# Patient Record
Sex: FEMALE | Race: WHITE | NOT HISPANIC OR LATINO | Employment: UNEMPLOYED | URBAN - METROPOLITAN AREA
[De-identification: names, ages, dates, MRNs, and addresses within clinical notes are randomized per-mention and may not be internally consistent; named-entity substitution may affect disease eponyms.]

---

## 2017-04-05 ENCOUNTER — TELEPHONE (OUTPATIENT)
Dept: PULMONOLOGY | Facility: CLINIC | Age: 57
End: 2017-04-05

## 2017-04-05 DIAGNOSIS — J43.9 PULMONARY EMPHYSEMA, UNSPECIFIED EMPHYSEMA TYPE (HCC): Primary | ICD-10-CM

## 2017-04-05 RX ORDER — ALBUTEROL SULFATE 90 UG/1
2 AEROSOL, METERED RESPIRATORY (INHALATION) AS NEEDED
Status: DISCONTINUED | OUTPATIENT
Start: 2017-04-05 | End: 2021-01-04

## 2017-04-05 NOTE — TELEPHONE ENCOUNTER
Patient called requesting Rx for Qvar, ProAir, and something to replace her Symbicort inhaler since it was not covered.    E-scribed Qvar and ProAir IN per KB.  Called and spoke with pharmacist who stated both Qvar and Dulera 200 (to replace Symbicort) were both covered. He stated the ProAir was not covered but he'd run an Rx for an albuterol rescue inhaler that is covered.       2-4 times/mo

## 2017-05-08 RX ORDER — TRIAMCINOLONE ACETONIDE 1 MG/G
CREAM TOPICAL
Qty: 45 G | Refills: 0 | Status: SHIPPED | OUTPATIENT
Start: 2017-05-08 | End: 2018-10-30 | Stop reason: SDUPTHER

## 2017-06-27 RX ORDER — TIOTROPIUM BROMIDE 18 UG/1
CAPSULE ORAL; RESPIRATORY (INHALATION)
Qty: 30 CAPSULE | Refills: 11 | Status: SHIPPED | OUTPATIENT
Start: 2017-06-27 | End: 2017-08-28 | Stop reason: SDUPTHER

## 2017-06-27 RX ORDER — PREDNISONE 20 MG/1
TABLET ORAL
Qty: 30 TABLET | Refills: 3 | Status: SHIPPED | OUTPATIENT
Start: 2017-06-27 | End: 2018-03-05 | Stop reason: SDUPTHER

## 2017-07-31 DIAGNOSIS — J43.9 PULMONARY EMPHYSEMA, UNSPECIFIED EMPHYSEMA TYPE (HCC): ICD-10-CM

## 2017-07-31 RX ORDER — MOMETASONE FUROATE AND FORMOTEROL FUMARATE DIHYDRATE 200; 5 UG/1; UG/1
AEROSOL RESPIRATORY (INHALATION)
Qty: 13 G | Refills: 0 | Status: SHIPPED | OUTPATIENT
Start: 2017-07-31 | End: 2017-08-28 | Stop reason: SDUPTHER

## 2017-08-28 ENCOUNTER — OFFICE VISIT (OUTPATIENT)
Dept: PULMONOLOGY | Facility: CLINIC | Age: 57
End: 2017-08-28

## 2017-08-28 VITALS
OXYGEN SATURATION: 95 % | DIASTOLIC BLOOD PRESSURE: 74 MMHG | HEIGHT: 66 IN | TEMPERATURE: 98.3 F | SYSTOLIC BLOOD PRESSURE: 120 MMHG | BODY MASS INDEX: 23.3 KG/M2 | HEART RATE: 57 BPM | WEIGHT: 145 LBS | RESPIRATION RATE: 16 BRPM

## 2017-08-28 DIAGNOSIS — Z87.891 PERSONAL HISTORY OF TOBACCO USE, PRESENTING HAZARDS TO HEALTH: ICD-10-CM

## 2017-08-28 DIAGNOSIS — Z99.81 DEPENDENCE ON SUPPLEMENTAL OXYGEN: ICD-10-CM

## 2017-08-28 DIAGNOSIS — J43.9 PULMONARY EMPHYSEMA, UNSPECIFIED EMPHYSEMA TYPE (HCC): Primary | ICD-10-CM

## 2017-08-28 PROCEDURE — 99213 OFFICE O/P EST LOW 20 MIN: CPT | Performed by: NURSE PRACTITIONER

## 2017-08-28 NOTE — PROGRESS NOTES
[facility] Pulmonary Follow up    CHIEF COMPLAINT    COPD    HISTORY OF PRESENT ILLNESS    Nolvia Huff is a 57 y.o.female here today for follow-up on her severe COPD and nocturnal hypoxemia.    She last saw Samreen Sandra on 10/4/16.  She reports she's been doing well since then with no acute illnesses or exacerbations.  She remains on Dulera 200 µg twice a day, Spiriva HandiHaler once daily, and Ventolin as needed.  She reports she uses her Ventolin 2-3 times a day.  She also has Qvar and prednisone that she uses as needed when she experiences increased wheezing.  She continues to take azithromycin 3 times a week as well as Mucinex daily.  She has an occasional cough with clear sputum.  She has Tessalon available but rarely takes this.  She denies any fever or chills.    She is under some stress as her mother is currently in a LTACH and she has moved her father into her home to help care for him.  She was actually supposed to see Samreen Sandra later on this week but requested to be seen today as her father was in the office as well.    She has a 30 year history of smoking before quitting in .  She reports a CT of the chest for lung cancer screening has been ordered for her before.  She had some issues with scheduling and reports eventually the order  before she could have this done.  She is interested in having a CT of the chest done at this time.    Patient Active Problem List   Diagnosis   • Dependence on supplemental oxygen   • Tobacco abuse   • Nocturnal hypoxemia   • Anxiety disorder   • Dyslipidemia   • Emphysema/COPD   • GERD (gastroesophageal reflux disease)   • Osteoporosis   • Personal history of tobacco use, presenting hazards to health       Allergies   Allergen Reactions   • Penicillins        Current Outpatient Prescriptions:   •  benzonatate (TESSALON) 100 MG capsule, Take  by mouth., Disp: , Rfl:   •  guaiFENesin (MUCINEX) 600 MG 12 hr tablet, Take  by mouth Every 12 (Twelve) Hours.,  Disp: , Rfl:   •  mometasone-formoterol (DULERA) 200-5 MCG/ACT inhaler, Inhale 2 puffs 2 (Two) Times a Day., Disp: 13 g, Rfl: 0  •  montelukast (SINGULAIR) 10 MG tablet, Take 1 tablet by mouth Every Night., Disp: 30 tablet, Rfl: 11  •  predniSONE (DELTASONE) 20 MG tablet, Take  by mouth As Needed., Disp: , Rfl:   •  predniSONE (DELTASONE) 20 MG tablet, TAKE 1 TABLET BY MOUTH EVERY DAY, Disp: 30 tablet, Rfl: 3  •  QVAR 80 MCG/ACT inhaler, INHALE 2 PUFFS 2 (TWO) TIMES A DAY., Disp: 1 inhaler, Rfl: 0  •  tiotropium (SPIRIVA HANDIHALER) 18 MCG per inhalation capsule, Place 1 capsule into inhaler and inhale Daily., Disp: 30 capsule, Rfl: 11  •  tiotropium (SPIRIVA) 18 MCG per inhalation capsule, Place 2 puffs into inhaler and inhale Daily., Disp: 30 capsule, Rfl: 11  •  triamcinolone (KENALOG) 0.1 % cream, APPLY SPARINGLY AND MASSAGE IN TWICE DAILY, Disp: 45 g, Rfl: 0  •  VENTOLIN  (90 BASE) MCG/ACT inhaler, INHALE 1 TO 2 PUFFS EVERY 6 HOURS AS NEEDED., Disp: 18 g, Rfl: 6    Current Facility-Administered Medications:   •  albuterol (PROVENTIL HFA;VENTOLIN HFA) inhaler 2 puff, 2 puff, Inhalation, PRN, Samreen Sandra, APRN  MEDICATION LIST AND ALLERGIES REVIEWED.    Social History   Substance Use Topics   • Smoking status: Former Smoker     Packs/day: 2.00     Years: 30.00     Types: Cigarettes     Quit date: 2004   • Smokeless tobacco: None      Comment: fomer smoker 2 ppd x 30 years-Still smoke 1 cig, sporadically   • Alcohol use Yes      Comment: 1/2 drinks per year       FAMILY AND SOCIAL HISTORY REVIEWED.    Review of Systems   Constitutional: Negative for chills, fatigue, fever and unexpected weight change.   HENT: Negative for congestion, nosebleeds, postnasal drip, rhinorrhea, sinus pressure and trouble swallowing.    Respiratory: Positive for cough and wheezing. Negative for chest tightness and shortness of breath.    Cardiovascular: Negative for chest pain and leg swelling.   Gastrointestinal: Negative  "for abdominal pain, constipation, diarrhea, nausea and vomiting.   Genitourinary: Negative for dysuria, frequency, hematuria and urgency.   Musculoskeletal: Negative for myalgias.   Neurological: Negative for dizziness, weakness, numbness and headaches.   All other systems reviewed and are negative.  .    /74  Pulse 57  Temp 98.3 °F (36.8 °C)  Resp 16  Ht 66\" (167.6 cm)  Wt 145 lb (65.8 kg)  SpO2 95% Comment: RA  BMI 23.4 kg/m2    Physical Exam   Constitutional: She is oriented to person, place, and time. She appears well-developed. No distress.   HENT:   Head: Normocephalic and atraumatic.   Neck: Neck supple.   Cardiovascular: Normal rate, regular rhythm and normal heart sounds.  Exam reveals no friction rub.    No murmur heard.  Pulmonary/Chest: Effort normal and breath sounds normal. No respiratory distress. She has no wheezes. She has no rales.   Musculoskeletal: Normal range of motion. She exhibits no edema.   Neurological: She is alert and oriented to person, place, and time.   Skin: Skin is warm and dry. No erythema.   Psychiatric: She has a normal mood and affect. Her behavior is normal.   Vitals reviewed.        RESULTS        PROBLEM LIST    Problem List Items Addressed This Visit        Respiratory    Emphysema/COPD - Primary    Overview      Emphysema/COPD (492.8)   · A.  Moderate to severe obstruction, former tobacco abuse.B.  Controlled with the      azithromycin every Monday Wednesday Friday.      B. 07/06/15 Spirometry reveals an increase in obstruction, FEV1 now 42% instead of 53%. Minute ventilation reduced. Forced vital capacity is reduced suggesting a restrictive pattern.          Relevant Medications    mometasone-formoterol (DULERA) 200-5 MCG/ACT inhaler    tiotropium (SPIRIVA HANDIHALER) 18 MCG per inhalation capsule       Other    Dependence on supplemental oxygen    Personal history of tobacco use, presenting hazards to health            DISCUSSION    Continue current " medication regimen of Dulera, Spiriva, azithromycin, and Mucinex.  Continue Qvar, Ventolin, and prednisone as needed.  Continue supplemental oxygen at night.  Low-dose CT of the chest for lung cancer screening.  Follow-up in 6 months.  We will try to coordinate her appointment to be the same day as her father's.      Arelis Mae, APRN  08/28/201711:09 AM  Electronically signed     Please note that portions of this note were completed with a voice recognition program. Efforts were made to edit the dictations, but occasionally words are mistranscribed.      CC: Alfonso Denney MD

## 2017-09-27 ENCOUNTER — HOSPITAL ENCOUNTER (OUTPATIENT)
Dept: CT IMAGING | Facility: HOSPITAL | Age: 57
Discharge: HOME | End: 2017-09-27
Admitting: NURSE PRACTITIONER

## 2017-09-27 DIAGNOSIS — Z87.891 PERSONAL HISTORY OF TOBACCO USE, PRESENTING HAZARDS TO HEALTH: ICD-10-CM

## 2017-09-27 PROCEDURE — G0297 LDCT FOR LUNG CA SCREEN: HCPCS

## 2017-10-02 RX ORDER — MOMETASONE FUROATE AND FORMOTEROL FUMARATE DIHYDRATE 200; 5 UG/1; UG/1
AEROSOL RESPIRATORY (INHALATION)
Qty: 13 G | Refills: 7 | Status: SHIPPED | OUTPATIENT
Start: 2017-10-02 | End: 2018-06-03 | Stop reason: SDUPTHER

## 2018-03-05 ENCOUNTER — OFFICE VISIT (OUTPATIENT)
Dept: PULMONOLOGY | Facility: CLINIC | Age: 58
End: 2018-03-05

## 2018-03-05 VITALS
BODY MASS INDEX: 24.1 KG/M2 | SYSTOLIC BLOOD PRESSURE: 130 MMHG | WEIGHT: 159 LBS | DIASTOLIC BLOOD PRESSURE: 80 MMHG | HEIGHT: 68 IN | OXYGEN SATURATION: 90 % | HEART RATE: 78 BPM | TEMPERATURE: 98.6 F

## 2018-03-05 DIAGNOSIS — R06.02 SHORTNESS OF BREATH: Primary | ICD-10-CM

## 2018-03-05 PROCEDURE — 94060 EVALUATION OF WHEEZING: CPT | Performed by: NURSE PRACTITIONER

## 2018-03-05 PROCEDURE — 99214 OFFICE O/P EST MOD 30 MIN: CPT | Performed by: NURSE PRACTITIONER

## 2018-03-05 RX ORDER — ALBUTEROL SULFATE 90 UG/1
2 AEROSOL, METERED RESPIRATORY (INHALATION) EVERY 4 HOURS PRN
Qty: 18 G | Refills: 11 | Status: SHIPPED | OUTPATIENT
Start: 2018-03-05 | End: 2019-01-30 | Stop reason: SDUPTHER

## 2018-03-05 RX ORDER — OMEPRAZOLE 40 MG/1
40 CAPSULE, DELAYED RELEASE ORAL DAILY
Qty: 30 CAPSULE | Refills: 11 | Status: SHIPPED | OUTPATIENT
Start: 2018-03-05

## 2018-03-05 RX ORDER — ALBUTEROL SULFATE 90 UG/1
4 AEROSOL, METERED RESPIRATORY (INHALATION) ONCE
Status: COMPLETED | OUTPATIENT
Start: 2018-03-05 | End: 2018-03-05

## 2018-03-05 RX ORDER — AZITHROMYCIN 250 MG/1
TABLET, FILM COATED ORAL
Qty: 15 TABLET | Refills: 2 | Status: SHIPPED | OUTPATIENT
Start: 2018-03-05 | End: 2018-09-10 | Stop reason: SDUPTHER

## 2018-03-05 RX ORDER — PREDNISONE 20 MG/1
TABLET ORAL
Qty: 14 TABLET | Refills: 2 | Status: SHIPPED | OUTPATIENT
Start: 2018-03-05 | End: 2018-09-10

## 2018-03-05 RX ORDER — MONTELUKAST SODIUM 10 MG/1
10 TABLET ORAL NIGHTLY
Qty: 30 TABLET | Refills: 11 | Status: SHIPPED | OUTPATIENT
Start: 2018-03-05 | End: 2018-09-10 | Stop reason: SDUPTHER

## 2018-03-05 RX ADMIN — ALBUTEROL SULFATE 4 PUFF: 90 AEROSOL, METERED RESPIRATORY (INHALATION) at 09:04

## 2018-03-05 NOTE — PROGRESS NOTES
Deyvi with bronchodilator. 4 puffs of Ventolin HFA administered. Patient tolerated procedure well.

## 2018-03-05 NOTE — PROGRESS NOTES
[facility] Pulmonary Follow up    CHIEF COMPLAINT    Copd, former tobacco abuse, nocturnal hypoxemia    HISTORY OF PRESENT ILLNESS    Nolvia Huff is a 58 y.o.female here today for fu. She was last in the office and saw Arelis HO 2017. She is a history of severe end-stage COPD, frequent bronchitis, nocturnal hypoxemia.  She is here today for follow-up.    She had a low-dose screening CT  showing moderate bibasilar bronchiectasis, obstructive lung disease with emphysema, and a 6 mm pleural-based nodule in the right chest, associated with emphysematous bulla, low index of suspicion.  She is due for another scan in a couple weeks.    She is on oxygen 2 L at night prior overnight testing showed she does desaturate, she remains on 2 L at night.  She wears this each night.    She remains on dulera 200, 2 puffs twice a day, Spiriva hand-held, albuterol nebulizers 3-4 times a day which help with her shortness of breath but occasionally don't last.  She denies fevers chills or night sweats and has no hemoptysis or recent hospitalization.  No recent emergency room visit.  She has a lot of stress in her family as her father , her mother has been sick; and she still cares for her graddaughter.     She has some epigastric type chest pain that comes and goes.  She takes prilosec occasionally .  She's not sure if its pain related to her heart; as she has no history of heart disease.  She does have a history of reflux.    She takes Azythromycin as needed for bronchitis; as well as prednisone.     She still has an occasional cough as above, she has Tessalon Perles which helped.  She also has prednisone tapers that she uses a few days at a time a couple times a month for dyspnea.  She does have a little bit more dyspnea than usual but she's not sure if it's related to stress given the recent loss of her father and all her family issues.        Patient Active Problem List   Diagnosis   • Dependence on  supplemental oxygen   • Tobacco abuse   • Nocturnal hypoxemia   • Anxiety disorder   • Dyslipidemia   • Emphysema/COPD   • GERD (gastroesophageal reflux disease)   • Osteoporosis   • Personal history of tobacco use, presenting hazards to health       Allergies   Allergen Reactions   • Penicillins        Current Outpatient Prescriptions:   •  benzonatate (TESSALON) 100 MG capsule, Take  by mouth., Disp: , Rfl:   •  DULERA 200-5 MCG/ACT inhaler, INHALE 2 PUFFS 2 (TWO) TIMES A DAY., Disp: 13 g, Rfl: 7  •  guaiFENesin (MUCINEX) 600 MG 12 hr tablet, Take  by mouth Every 12 (Twelve) Hours., Disp: , Rfl:   •  montelukast (SINGULAIR) 10 MG tablet, Take 1 tablet by mouth Every Night., Disp: 30 tablet, Rfl: 11  •  predniSONE (DELTASONE) 20 MG tablet, Take  by mouth As Needed., Disp: , Rfl:   •  QVAR 80 MCG/ACT inhaler, INHALE 2 PUFFS 2 (TWO) TIMES A DAY., Disp: 1 inhaler, Rfl: 0  •  tiotropium (SPIRIVA HANDIHALER) 18 MCG per inhalation capsule, Place 1 capsule into inhaler and inhale Daily., Disp: 30 capsule, Rfl: 11  •  triamcinolone (KENALOG) 0.1 % cream, APPLY SPARINGLY AND MASSAGE IN TWICE DAILY, Disp: 45 g, Rfl: 0  •  VENTOLIN  (90 BASE) MCG/ACT inhaler, INHALE 1 TO 2 PUFFS EVERY 6 HOURS AS NEEDED., Disp: 18 g, Rfl: 6    Current Facility-Administered Medications:   •  albuterol (PROVENTIL HFA;VENTOLIN HFA) inhaler 2 puff, 2 puff, Inhalation, PRN, Samreen Sandra, APRN  MEDICATION LIST AND ALLERGIES REVIEWED.    Social History   Substance Use Topics   • Smoking status: Former Smoker     Packs/day: 2.00     Years: 30.00     Types: Cigarettes     Quit date: 2004   • Smokeless tobacco: None      Comment: fomer smoker 2 ppd x 30 years-Still smoke 1 cig, sporadically   • Alcohol use Yes      Comment: 1/2 drinks per year       FAMILY AND SOCIAL HISTORY REVIEWED.    Review of Systems   Constitutional: Negative for activity change, chills, fatigue and fever.   HENT: Positive for postnasal drip. Negative for hearing loss,  "nosebleeds and sneezing.    Respiratory: Positive for cough and shortness of breath. Negative for apnea, chest tightness, wheezing and stridor.    Cardiovascular: Negative for chest pain, palpitations and leg swelling.        NO INCREASE IN MILD BLE EDEMA; NO CALF TENDERNESS    Gastrointestinal: Negative for abdominal pain, diarrhea and nausea.   Neurological: Negative for dizziness, syncope and light-headedness.   .    /80 (BP Location: Right arm, Patient Position: Sitting, Cuff Size: Adult)  Pulse 78  Temp 98.6 °F (37 °C)  Ht 172.7 cm (68\")  Wt 72.1 kg (159 lb)  SpO2 90%  BMI 24.18 kg/m2  Physical Exam   Constitutional: She is oriented to person, place, and time. She appears well-developed. No distress.   HENT:   Head: Normocephalic.   Eyes: Pupils are equal, round, and reactive to light.   Neck: Normal range of motion. No JVD present. No thyromegaly present.   Cardiovascular: Normal rate, regular rhythm and normal heart sounds.  Exam reveals no friction rub.    Mild BLE edema; no venous cords or calf tenderness.    Pulmonary/Chest: Effort normal. No stridor. No respiratory distress. She has no wheezes. She has no rales. She exhibits no tenderness.   Lymphadenopathy:     She has no cervical adenopathy.   Neurological: She is alert and oriented to person, place, and time.   Skin: She is not diaphoretic.   Psychiatric: She has a normal mood and affect. Her behavior is normal. Thought content normal.       RESULTS    Spirometry is improved compared to 2016, at that time she had an FEV1 of 39%, 1.09 L.  Today FEV1 is 1.38 L, 50%.      PROBLEM LIST    Moderate COPD  Prior tobacco abuse  Incidental 6 mm right lung nodule  GERD  SOB  Caregiver strain    DISCUSSION    Because of the nodule found September 2017 we'll do a 6 month follow-up in a couple weeks, she was given our written phone number extension [phone] to call for the results but we will of course call her with her now the CT findings prior to her next " visit.    She has slightly concerned given her family history of lung cancer.  She has remained off cigarettes however since 2004.    Continue on the oxygen at night.    She was given samples of Spiriva Respimat 2.5; written instructions to try to use this instead of the handheld, written prescription for each to turn in as she can always switch to the respimat if she likes this better    Continue on Dulera, continue on Singulair and Ventolin, continue oxygen at night.  Sent to the pharmacy.    She'll follow-up in 6 months,and was told to call and given extensions [phone] if needed for an earlier visit if problems arise prior to the scheduled followup.      Samreen Sandra, APRN  03/05/20189:54 AM  Electronically signed     Please note that portions of this note were completed with a voice recognition program. Efforts were made to edit the dictations, but occasionally words are mistranscribed.      CC: Alfonso Denney MD

## 2018-03-15 DIAGNOSIS — R91.1 LUNG NODULE: Primary | ICD-10-CM

## 2018-03-15 DIAGNOSIS — Z72.0 TOBACCO ABUSE: ICD-10-CM

## 2018-03-26 ENCOUNTER — HOSPITAL ENCOUNTER (OUTPATIENT)
Dept: CT IMAGING | Facility: HOSPITAL | Age: 58
Discharge: HOME | End: 2018-03-26
Admitting: NURSE PRACTITIONER

## 2018-03-26 DIAGNOSIS — Z72.0 TOBACCO ABUSE: ICD-10-CM

## 2018-03-26 DIAGNOSIS — R91.1 LUNG NODULE: ICD-10-CM

## 2018-03-26 PROCEDURE — 71250 CT THORAX DX C-: CPT

## 2018-04-02 ENCOUNTER — TELEPHONE (OUTPATIENT)
Dept: PULMONOLOGY | Facility: CLINIC | Age: 58
End: 2018-04-02

## 2018-04-02 NOTE — TELEPHONE ENCOUNTER
Ms Huff called for her CT results. Her 6 mm nodule is stable. She is concerned about the possibility of cancer. I reassured her that he nodule is unchanged but we will continue to monitor her with serial scans.

## 2018-06-04 RX ORDER — MOMETASONE FUROATE AND FORMOTEROL FUMARATE DIHYDRATE 200; 5 UG/1; UG/1
AEROSOL RESPIRATORY (INHALATION)
Qty: 13 G | Refills: 7 | Status: SHIPPED | OUTPATIENT
Start: 2018-06-04 | End: 2018-09-10 | Stop reason: SDUPTHER

## 2018-06-04 RX ORDER — PREDNISONE 20 MG/1
TABLET ORAL
Qty: 14 TABLET | Refills: 2 | OUTPATIENT
Start: 2018-06-04

## 2018-06-04 RX ORDER — AZITHROMYCIN 250 MG/1
TABLET, FILM COATED ORAL
Qty: 15 TABLET | Refills: 2 | OUTPATIENT
Start: 2018-06-04

## 2018-07-02 RX ORDER — TIOTROPIUM BROMIDE 18 UG/1
CAPSULE ORAL; RESPIRATORY (INHALATION)
Qty: 30 CAPSULE | Refills: 11 | Status: SHIPPED | OUTPATIENT
Start: 2018-07-02 | End: 2019-01-30 | Stop reason: SDUPTHER

## 2018-09-10 ENCOUNTER — OFFICE VISIT (OUTPATIENT)
Dept: PULMONOLOGY | Facility: CLINIC | Age: 58
End: 2018-09-10

## 2018-09-10 VITALS
OXYGEN SATURATION: 93 % | TEMPERATURE: 98.5 F | BODY MASS INDEX: 23.66 KG/M2 | SYSTOLIC BLOOD PRESSURE: 110 MMHG | WEIGHT: 147.2 LBS | HEART RATE: 81 BPM | DIASTOLIC BLOOD PRESSURE: 64 MMHG | HEIGHT: 66 IN | RESPIRATION RATE: 16 BRPM

## 2018-09-10 DIAGNOSIS — G47.34 NOCTURNAL HYPOXEMIA: ICD-10-CM

## 2018-09-10 DIAGNOSIS — Z87.891 PERSONAL HISTORY OF TOBACCO USE, PRESENTING HAZARDS TO HEALTH: ICD-10-CM

## 2018-09-10 DIAGNOSIS — J43.9 PULMONARY EMPHYSEMA, UNSPECIFIED EMPHYSEMA TYPE (HCC): Primary | ICD-10-CM

## 2018-09-10 DIAGNOSIS — Z12.2 ENCOUNTER FOR SCREENING FOR LUNG CANCER: ICD-10-CM

## 2018-09-10 PROCEDURE — 99214 OFFICE O/P EST MOD 30 MIN: CPT | Performed by: NURSE PRACTITIONER

## 2018-09-10 PROCEDURE — 94060 EVALUATION OF WHEEZING: CPT | Performed by: NURSE PRACTITIONER

## 2018-09-10 RX ORDER — ALBUTEROL SULFATE 90 UG/1
4 AEROSOL, METERED RESPIRATORY (INHALATION) ONCE
Status: COMPLETED | OUTPATIENT
Start: 2018-09-10 | End: 2018-09-10

## 2018-09-10 RX ORDER — AZITHROMYCIN 250 MG/1
TABLET, FILM COATED ORAL
Qty: 15 TABLET | Refills: 2 | Status: SHIPPED | OUTPATIENT
Start: 2018-09-10 | End: 2019-07-30

## 2018-09-10 RX ORDER — ALBUTEROL SULFATE 2.5 MG/3ML
2.5 SOLUTION RESPIRATORY (INHALATION) 4 TIMES DAILY PRN
Qty: 120 VIAL | Refills: 5 | Status: SHIPPED | OUTPATIENT
Start: 2018-09-10 | End: 2020-03-09 | Stop reason: SDUPTHER

## 2018-09-10 RX ADMIN — ALBUTEROL SULFATE 4 PUFF: 90 AEROSOL, METERED RESPIRATORY (INHALATION) at 10:56

## 2018-09-10 NOTE — PROGRESS NOTES
[facility] Pulmonary Follow up    CHIEF COMPLAINT    COPD, anemia, history of tobacco abuse    HISTORY OF PRESENT ILLNESS    Nolvia Huff is a 58 y.o.female former smoker with severe COPD and nocturnal hypoxemia here today for follow-up.    She last saw HERNAN Mora in March.  She has required a round of azithromycin since then.  She also indicates that she had a little bit more cough and congestion due to the recent weather changes.  She has Singulair but does not take this on a regular basis.  She feels like her wheezing and shortness of breath are about baseline.    She remains on Dulera, Spiriva, and albuterol.  She also has Qvar that she takes as needed when she feel she needs an increased ICS dose.  She continues on 2 L supplemental oxygen at night.    She had a low-dose screen CT of the chest in September 2017 which revealed a 6 mm nodule.  6 month follow-up CT in March 2018 demonstrated stability.      Patient Active Problem List   Diagnosis   • Dependence on supplemental oxygen   • Tobacco abuse   • Nocturnal hypoxemia   • Anxiety disorder   • Dyslipidemia   • Emphysema/COPD (CMS/HCC)   • GERD (gastroesophageal reflux disease)   • Osteoporosis   • Personal history of tobacco use, presenting hazards to health       Allergies   Allergen Reactions   • Penicillins        Current Outpatient Prescriptions:   •  albuterol (PROVENTIL HFA;VENTOLIN HFA) 108 (90 Base) MCG/ACT inhaler, Inhale 2 puffs Every 4 (Four) Hours As Needed for Wheezing., Disp: 18 g, Rfl: 11  •  benzonatate (TESSALON) 100 MG capsule, Take  by mouth., Disp: , Rfl:   •  guaiFENesin (MUCINEX) 600 MG 12 hr tablet, Take  by mouth Every 12 (Twelve) Hours., Disp: , Rfl:   •  mometasone-formoterol (DULERA 200) 200-5 MCG/ACT inhaler, 2 puffs am and pm; rinse and spit after use., Disp: 13 g, Rfl: 11  •  montelukast (SINGULAIR) 10 MG tablet, Take 1 tablet by mouth Every Night., Disp: 30 tablet, Rfl: 11  •  omeprazole (priLOSEC) 40 MG capsule, Take 1  capsule by mouth Daily., Disp: 30 capsule, Rfl: 11  •  QVAR 80 MCG/ACT inhaler, INHALE 2 PUFFS 2 (TWO) TIMES A DAY. (Patient taking differently: INHALE 2 PUFFS 2 PRN), Disp: 1 inhaler, Rfl: 0  •  SPIRIVA HANDIHALER 18 MCG per inhalation capsule, INHALE 1 CAPSULE IN DEVICE ONCE DAILY AS DIRECTED, Disp: 30 capsule, Rfl: 11  •  triamcinolone (KENALOG) 0.1 % cream, APPLY SPARINGLY AND MASSAGE IN TWICE DAILY, Disp: 45 g, Rfl: 0  •  albuterol (PROVENTIL) (2.5 MG/3ML) 0.083% nebulizer solution, Take 2.5 mg by nebulization 4 (Four) Times a Day As Needed for Wheezing., Disp: 120 vial, Rfl: 5  •  azithromycin (ZITHROMAX) 250 MG tablet, Take 2 daily x 1 day, then 1 daily x 13 days., Disp: 15 tablet, Rfl: 2    Current Facility-Administered Medications:   •  albuterol (PROVENTIL HFA;VENTOLIN HFA) inhaler 2 puff, 2 puff, Inhalation, PRN, Jocy, Samreen SOSA, HERNAN  MEDICATION LIST AND ALLERGIES REVIEWED.    Social History   Substance Use Topics   • Smoking status: Former Smoker     Packs/day: 2.00     Years: 30.00     Types: Cigarettes     Quit date: 2004   • Smokeless tobacco: Not on file      Comment: fomer smoker 2 ppd x 30 years-Still smoke 1 cig, sporadically   • Alcohol use Yes      Comment: 1/2 drinks per year       FAMILY AND SOCIAL HISTORY REVIEWED.    Review of Systems   Constitutional: Negative for chills, fatigue, fever and unexpected weight change.   HENT: Positive for congestion. Negative for nosebleeds, postnasal drip, rhinorrhea, sinus pressure and trouble swallowing.    Respiratory: Positive for cough and wheezing. Negative for chest tightness and shortness of breath.    Cardiovascular: Negative for chest pain and leg swelling.   Gastrointestinal: Negative for abdominal pain, constipation, diarrhea, nausea and vomiting.   Genitourinary: Negative for dysuria, frequency, hematuria and urgency.   Musculoskeletal: Negative for myalgias.   Neurological: Negative for dizziness, weakness, numbness and headaches.   All other  "systems reviewed and are negative.  .    /64   Pulse 81   Temp 98.5 °F (36.9 °C)   Resp 16   Ht 167.6 cm (66\")   Wt 66.8 kg (147 lb 3.2 oz)   SpO2 93% Comment: RA@rest  BMI 23.76 kg/m²     Immunization History   Administered Date(s) Administered   • Influenza, Unspecified 10/04/2016       Physical Exam   Constitutional: She is oriented to person, place, and time. She appears well-developed. No distress.   HENT:   Head: Normocephalic and atraumatic.   Neck: Neck supple.   Cardiovascular: Normal rate, regular rhythm and normal heart sounds.  Exam reveals no friction rub.    No murmur heard.  Pulmonary/Chest: Effort normal and breath sounds normal. No stridor. No respiratory distress. She has no wheezes. She has no rales.   Musculoskeletal: Normal range of motion. She exhibits no edema.   Lymphadenopathy:     She has no cervical adenopathy.   Neurological: She is alert and oriented to person, place, and time.   Skin: Skin is warm and dry. No erythema.   Psychiatric: She has a normal mood and affect. Her behavior is normal.   Vitals reviewed.        RESULTS    PFTS in the office today, read by me: Severe obstruction, FEV1 improved 180 mL after bronchodilator, FEV1 decline from prior testing in March 2018    PROBLEM LIST    Problem List Items Addressed This Visit        Respiratory    Nocturnal hypoxemia    Overview     . Nocturnal hypoxemia (327.24)   · A.  Nocturnal hypoxemia her overnight study of 06/15/2014, O2 at 2 L per minute each      bedtime.           Emphysema/COPD (CMS/[facility]) - Primary    Overview      Emphysema/COPD (492.8)   · A.  Moderate to severe obstruction, former tobacco abuse.B.  Controlled with the      azithromycin every Monday Wednesday Friday.      B. 07/06/15 Spirometry reveals an increase in obstruction, FEV1 now 42% instead of 53%. Minute ventilation reduced. Forced vital capacity is reduced suggesting a restrictive pattern.          Relevant Medications    albuterol (PROVENTIL " HFA;VENTOLIN HFA) inhaler 4 puff (Completed)    albuterol (PROVENTIL) (2.5 MG/3ML) 0.083% nebulizer solution    Other Relevant Orders    Spirometry With Bronchodilator (Completed)       Other    Personal history of tobacco use, presenting hazards to health    Relevant Orders    CT chest low dose wo      Other Visit Diagnoses     Encounter for screening for lung cancer                DISCUSSION    We reviewed her spirometry.  Her FEV1 has declined a little bit since March 2018 but is similar to prior testing in 2016.  She states that she does feel like she's had some worsening shortness of breath lately with the weather changes.  She will continue on her current inhaled regimen.  I encouraged her to begin using Singulair on a regular basis.    Continue nocturnal oxygen.    She is provided with a another prescription for azithromycin in event of exacerbation.    We reviewed her CT scan from March.  Her 6 mm nodule is stable but she does have a 60-pack-year history of smoking and needs further follow-up.  She will not need another CT scan until March 2019.  We will get a low-dose CT of the chest at the time.    Follow-up in 6 months or sooner if needed.    I spent 25 minutes with the patient. I spent > 50% percent of this time counseling and discussing diagnosis, diagnostic testing, current status and management.    Arelis Mae, HERNAN  09/10/325371:41 AM  Electronically signed     Please note that portions of this note were completed with a voice recognition program. Efforts were made to edit the dictations, but occasionally words are mistranscribed.      CC: Alfonso Denney MD

## 2018-10-30 RX ORDER — TRIAMCINOLONE ACETONIDE 1 MG/G
CREAM TOPICAL
Qty: 45 G | Refills: 0 | Status: SHIPPED | OUTPATIENT
Start: 2018-10-30 | End: 2019-05-07 | Stop reason: SDUPTHER

## 2019-01-28 DIAGNOSIS — J43.9 PULMONARY EMPHYSEMA, UNSPECIFIED EMPHYSEMA TYPE (HCC): Primary | ICD-10-CM

## 2019-01-30 ENCOUNTER — OFFICE VISIT (OUTPATIENT)
Dept: PULMONOLOGY | Facility: CLINIC | Age: 59
End: 2019-01-30

## 2019-01-30 VITALS
BODY MASS INDEX: 23.37 KG/M2 | WEIGHT: 145.4 LBS | TEMPERATURE: 98.2 F | SYSTOLIC BLOOD PRESSURE: 130 MMHG | OXYGEN SATURATION: 94 % | HEART RATE: 68 BPM | HEIGHT: 66 IN | DIASTOLIC BLOOD PRESSURE: 70 MMHG

## 2019-01-30 DIAGNOSIS — Z87.891 PERSONAL HISTORY OF TOBACCO USE, PRESENTING HAZARDS TO HEALTH: ICD-10-CM

## 2019-01-30 DIAGNOSIS — J43.9 PULMONARY EMPHYSEMA, UNSPECIFIED EMPHYSEMA TYPE (HCC): ICD-10-CM

## 2019-01-30 DIAGNOSIS — K21.9 GASTROESOPHAGEAL REFLUX DISEASE WITHOUT ESOPHAGITIS: ICD-10-CM

## 2019-01-30 DIAGNOSIS — G47.34 NOCTURNAL HYPOXEMIA: ICD-10-CM

## 2019-01-30 DIAGNOSIS — R06.02 SOB (SHORTNESS OF BREATH): Primary | ICD-10-CM

## 2019-01-30 DIAGNOSIS — Z23 IMMUNIZATION DUE: ICD-10-CM

## 2019-01-30 DIAGNOSIS — Z72.0 TOBACCO ABUSE: ICD-10-CM

## 2019-01-30 DIAGNOSIS — J44.1 COPD EXACERBATION (HCC): ICD-10-CM

## 2019-01-30 PROCEDURE — 90471 IMMUNIZATION ADMIN: CPT | Performed by: NURSE PRACTITIONER

## 2019-01-30 PROCEDURE — 99214 OFFICE O/P EST MOD 30 MIN: CPT | Performed by: NURSE PRACTITIONER

## 2019-01-30 PROCEDURE — 90686 IIV4 VACC NO PRSV 0.5 ML IM: CPT | Performed by: NURSE PRACTITIONER

## 2019-01-30 RX ORDER — DOXYCYCLINE 100 MG/1
100 CAPSULE ORAL 2 TIMES DAILY
Qty: 14 CAPSULE | Refills: 0 | Status: SHIPPED | OUTPATIENT
Start: 2019-01-30 | End: 2019-07-30

## 2019-01-30 RX ORDER — ALBUTEROL SULFATE 90 UG/1
2 AEROSOL, METERED RESPIRATORY (INHALATION) EVERY 4 HOURS PRN
Qty: 18 G | Refills: 11 | Status: SHIPPED | OUTPATIENT
Start: 2019-01-30 | End: 2020-10-20 | Stop reason: SDUPTHER

## 2019-01-30 RX ORDER — PREDNISONE 10 MG/1
TABLET ORAL
Qty: 30 TABLET | Refills: 0 | Status: SHIPPED | OUTPATIENT
Start: 2019-01-30 | End: 2019-07-30 | Stop reason: SDUPTHER

## 2019-01-30 NOTE — PROGRESS NOTES
[facility] Pulmonary Follow up    CHIEF COMPLAINT    Cough/sick    HISTORY OF PRESENT ILLNESS    Nolvia Huff is a 58 y.o.female here today for a sick visit.  She states that 3 weeks ago she developed flulike symptoms and has not felt well since that time.  She presented to [facility] and was diagnosed with the flu.  She was discharged on the 20th with Tamiflu.  She was unable to tolerate the full treatment of Tamiflu due to nausea.  She states she has not returned back to baseline yet.  She was concerned that she may have a problem with her lungs and wanted to be seen today in the office.    She continues to use Dulera and Spiriva for her emphysema.  She has been using albuterol nebulizers 2-3 times per day while she has been sick.    She remains on 2 L nasal cannula at nighttime for nocturnal hypoxemia and has been wearing this every night.    She denies fever, chills, hemoptysis, sputum production, weight loss, chest pain or palpitations.  She denies nasal drainage.  She occasionally has reflux symptoms and we'll taken omeprazole as needed for this.    She would like to receive her flu vaccination today if possible.  Due to her smoking history she is due for a low-dose CT screening in March and would like to set up as soon as possible.    Patient Active Problem List   Diagnosis   • Dependence on supplemental oxygen   • Tobacco abuse   • Nocturnal hypoxemia   • Anxiety disorder   • Dyslipidemia   • Emphysema/COPD (CMS/[facility])   • GERD (gastroesophageal reflux disease)   • Osteoporosis   • Personal history of tobacco use, presenting hazards to health   • COPD exacerbation (CMS/[facility])       Allergies   Allergen Reactions   • Penicillins        Current Outpatient Medications:   •  albuterol (PROVENTIL) (2.5 MG/3ML) 0.083% nebulizer solution, Take 2.5 mg by nebulization 4 (Four) Times a Day As Needed for Wheezing., Disp: 120 vial, Rfl: 5  •  albuterol sulfate  (90 Base) MCG/ACT inhaler, Inhale 2  puffs Every 4 (Four) Hours As Needed for Wheezing., Disp: 18 g, Rfl: 11  •  azithromycin (ZITHROMAX) 250 MG tablet, Take 2 daily x 1 day, then 1 daily x 13 days., Disp: 15 tablet, Rfl: 2  •  benzonatate (TESSALON) 100 MG capsule, Take  by mouth., Disp: , Rfl:   •  guaiFENesin (MUCINEX) 600 MG 12 hr tablet, Take  by mouth Every 12 (Twelve) Hours., Disp: , Rfl:   •  mometasone-formoterol (DULERA 200) 200-5 MCG/ACT inhaler, 2 puffs am and pm; rinse and spit after use., Disp: 13 g, Rfl: 11  •  montelukast (SINGULAIR) 10 MG tablet, Take 1 tablet by mouth Every Night., Disp: 30 tablet, Rfl: 11  •  omeprazole (priLOSEC) 40 MG capsule, Take 1 capsule by mouth Daily., Disp: 30 capsule, Rfl: 11  •  QVAR 80 MCG/ACT inhaler, INHALE 2 PUFFS 2 (TWO) TIMES A DAY. (Patient taking differently: INHALE 2 PUFFS 2 PRN), Disp: 1 inhaler, Rfl: 0  •  tiotropium (SPIRIVA HANDIHALER) 18 MCG per inhalation capsule, Place 1 capsule into inhaler and inhale Daily., Disp: 30 capsule, Rfl: 11  •  triamcinolone (KENALOG) 0.1 % cream, APPLY SPARINGLY AND MASSAGE IN TWICE DAILY, Disp: 45 g, Rfl: 0  •  doxycycline (MONODOX) 100 MG capsule, Take 1 capsule by mouth 2 (Two) Times a Day., Disp: 14 capsule, Rfl: 0  •  predniSONE (DELTASONE) 10 MG tablet, Take 4 tabs daily x 3 days, then take 3 tabs daily x 3 days, then take 2 tabs daily x 3 days, then take 1 tab daily x 3 days, Disp: 30 tablet, Rfl: 0    Current Facility-Administered Medications:   •  albuterol (PROVENTIL HFA;VENTOLIN HFA) inhaler 2 puff, 2 puff, Inhalation, PRN, Samreen Sandra, APRN  MEDICATION LIST AND ALLERGIES REVIEWED.    Social History     Tobacco Use   • Smoking status: Former Smoker     Packs/day: 2.00     Years: 30.00     Pack years: 60.00     Types: Cigarettes     Last attempt to quit: 2004     Years since quitting: 15.0   • Tobacco comment: fomer smoker 2 ppd x 30 years-Still smoke 1 cig, sporadically   Substance Use Topics   • Alcohol use: Yes     Comment: 1/2 drinks per year  "  • Drug use: Yes     Types: Marijuana     Comment: occasionally smoked marijuana       FAMILY AND SOCIAL HISTORY REVIEWED.    Review of Systems   Constitutional: Negative for activity change, appetite change, fatigue, fever and unexpected weight change.   HENT: Positive for congestion. Negative for postnasal drip, rhinorrhea, sinus pressure, sore throat and voice change.    Eyes: Negative for visual disturbance.   Respiratory: Positive for cough, chest tightness and shortness of breath. Negative for wheezing.    Cardiovascular: Negative for chest pain, palpitations and leg swelling.   Gastrointestinal: Negative for abdominal distention, abdominal pain, nausea and vomiting.   Endocrine: Negative for cold intolerance and heat intolerance.   Genitourinary: Negative for difficulty urinating and urgency.   Musculoskeletal: Negative for arthralgias, back pain and neck pain.   Skin: Negative for color change and pallor.   Allergic/Immunologic: Negative for environmental allergies and food allergies.   Neurological: Negative for dizziness, syncope, weakness and light-headedness.   Hematological: Negative for adenopathy. Does not bruise/bleed easily.   Psychiatric/Behavioral: Negative for agitation and behavioral problems.   .    /70   Pulse 68   Temp 98.2 °F (36.8 °C)   Ht 167.6 cm (66\")   Wt 66 kg (145 lb 6.4 oz)   SpO2 94% Comment: resting, room air  BMI 23.47 kg/m²     Immunization History   Administered Date(s) Administered   • Flu Vaccine Quad PF >36MO 01/30/2019   • Influenza, Unspecified 10/04/2016       Physical Exam   Constitutional: She is oriented to person, place, and time. She appears well-developed and well-nourished.   HENT:   Head: Normocephalic and atraumatic.   Eyes: Pupils are equal, round, and reactive to light.   Neck: Normal range of motion. Neck supple. No thyromegaly present.   Cardiovascular: Normal rate, regular rhythm, normal heart sounds and intact distal pulses. Exam reveals no " gallop and no friction rub.   No murmur heard.  Pulmonary/Chest: Effort normal and breath sounds normal. No respiratory distress. She has no wheezes. She has no rales. She exhibits no tenderness.   Abdominal: Soft. Bowel sounds are normal. There is no tenderness.   Musculoskeletal: Normal range of motion.   Lymphadenopathy:     She has no cervical adenopathy.   Neurological: She is alert and oriented to person, place, and time.   Skin: Skin is warm and dry. Capillary refill takes less than 2 seconds. She is not diaphoretic.   Psychiatric: She has a normal mood and affect. Her behavior is normal.   Nursing note and vitals reviewed.        RESULTS    Chest PA/lateral:  Reviewed by me in the office today, appears to have no acute pulmonary process with chronic lung changes, no effusions noted.    PROBLEM LIST    Problem List Items Addressed This Visit        Respiratory    Nocturnal hypoxemia    Overview     . Nocturnal hypoxemia (327.24)   · A.  Nocturnal hypoxemia her overnight study of 06/15/2014, O2 at 2 L per minute each      bedtime.           Emphysema/COPD (CMS/[facility])    Overview      Emphysema/COPD (492.8)   · A.  Moderate to severe obstruction, former tobacco abuse.B.  Controlled with the      azithromycin every Monday Wednesday Friday.      B. 07/06/15 Spirometry reveals an increase in obstruction, FEV1 now 42% instead of 53%. Minute ventilation reduced. Forced vital capacity is reduced suggesting a restrictive pattern.          Relevant Medications    mometasone-formoterol (DULERA 200) 200-5 MCG/ACT inhaler    tiotropium (SPIRIVA HANDIHALER) 18 MCG per inhalation capsule    albuterol sulfate  (90 Base) MCG/ACT inhaler    predniSONE (DELTASONE) 10 MG tablet    COPD exacerbation (CMS/[facility])    Relevant Medications    mometasone-formoterol (DULERA 200) 200-5 MCG/ACT inhaler    tiotropium (SPIRIVA HANDIHALER) 18 MCG per inhalation capsule    albuterol sulfate  (90 Base) MCG/ACT inhaler    predniSONE  (DELTASONE) 10 MG tablet    doxycycline (MONODOX) 100 MG capsule       Digestive    GERD (gastroesophageal reflux disease)       Other    Tobacco abuse    Overview     Has smoked cigarettes within prior year (V15.82)   · A.  The patient is a former smoker of 2 packs of cigarettes a day for 30 years prior to      quitting in 2004.   Pt will smoke 1 cigarette sporadically 07/06/15         Personal history of tobacco use, presenting hazards to health    Relevant Orders    CT chest low dose wo      Other Visit Diagnoses     SOB (shortness of breath)    -  Primary    Relevant Orders    XR Chest PA & Lateral    Immunization due        Relevant Orders    Flu Vaccine Quad PF 3YR+ (Completed)            DISCUSSION    Miss Huff was here for a sick visit today.  She appears to be having a COPD exacerbation and I will treat her for this.  I will call in an antibiotic and steroids for her.  She is agreeable to this plan.    She will continue Dulera and Spiriva for her emphysema.  She will continue to use her albuterol nebulizers as needed for shortness of breath.  I also provided her some samples of Mucinex in our office today to take while she has been feeling sick.    She will continue to take omeprazole as needed for her GERD.    Based on her smoking history of 60 pack year history and she quit in 2004 this last year that she will qualify for a CT low-dose screening.  I will place the order today to be performed in March.  I will call her when I receive the results of her CT scan.    She will she will return in 6 months or sooner if her symptoms worsen.  She will call me if she has any other additional concerns.  I spent 25 minutes with the patient. I spent > 50% percent of this time counseling and discussing diagnosis, prognosis, diagnostic testing, evaluation, current status, treatment options and management.    Maryellen Higuera, APRN  01/30/201911:56 AM  Electronically signed     Please note that portions of this note  were completed with a voice recognition program. Efforts were made to edit the dictations, but occasionally words are mistranscribed.      CC: Alfonso Denney MD

## 2019-03-04 ENCOUNTER — TELEPHONE (OUTPATIENT)
Dept: PULMONOLOGY | Facility: CLINIC | Age: 59
End: 2019-03-04

## 2019-03-04 NOTE — TELEPHONE ENCOUNTER
Received Denial from [facility] for Dulera they will cover Breo will change to Breo per Maryellen will send to [facility] for filling

## 2019-03-27 ENCOUNTER — HOSPITAL ENCOUNTER (OUTPATIENT)
Dept: CT IMAGING | Facility: HOSPITAL | Age: 59
Discharge: HOME | End: 2019-03-27
Admitting: NURSE PRACTITIONER

## 2019-03-27 DIAGNOSIS — Z87.891 PERSONAL HISTORY OF TOBACCO USE, PRESENTING HAZARDS TO HEALTH: ICD-10-CM

## 2019-03-27 PROCEDURE — G0297 LDCT FOR LUNG CA SCREEN: HCPCS

## 2019-05-07 RX ORDER — TRIAMCINOLONE ACETONIDE 1 MG/G
CREAM TOPICAL
Qty: 45 G | Refills: 0 | Status: SHIPPED | OUTPATIENT
Start: 2019-05-07 | End: 2020-01-09

## 2019-07-05 DIAGNOSIS — J43.9 PULMONARY EMPHYSEMA, UNSPECIFIED EMPHYSEMA TYPE (HCC): Primary | ICD-10-CM

## 2019-07-08 NOTE — TELEPHONE ENCOUNTER
Fax refill request for Rx Spiriva Capsules approved and faxed per chart for 90 days supply to [facility].

## 2019-07-30 ENCOUNTER — OFFICE VISIT (OUTPATIENT)
Dept: PULMONOLOGY | Facility: CLINIC | Age: 59
End: 2019-07-30

## 2019-07-30 VITALS
TEMPERATURE: 96.2 F | HEIGHT: 66 IN | OXYGEN SATURATION: 93 % | DIASTOLIC BLOOD PRESSURE: 70 MMHG | BODY MASS INDEX: 23.3 KG/M2 | WEIGHT: 145 LBS | SYSTOLIC BLOOD PRESSURE: 126 MMHG | HEART RATE: 54 BPM

## 2019-07-30 DIAGNOSIS — Z87.891 PERSONAL HISTORY OF TOBACCO USE, PRESENTING HAZARDS TO HEALTH: ICD-10-CM

## 2019-07-30 DIAGNOSIS — K21.9 GASTROESOPHAGEAL REFLUX DISEASE WITHOUT ESOPHAGITIS: ICD-10-CM

## 2019-07-30 DIAGNOSIS — J43.9 PULMONARY EMPHYSEMA, UNSPECIFIED EMPHYSEMA TYPE (HCC): ICD-10-CM

## 2019-07-30 DIAGNOSIS — J44.1 COPD EXACERBATION (HCC): Primary | ICD-10-CM

## 2019-07-30 DIAGNOSIS — Z99.81 DEPENDENCE ON SUPPLEMENTAL OXYGEN: ICD-10-CM

## 2019-07-30 PROCEDURE — 94726 PLETHYSMOGRAPHY LUNG VOLUMES: CPT | Performed by: NURSE PRACTITIONER

## 2019-07-30 PROCEDURE — 94375 RESPIRATORY FLOW VOLUME LOOP: CPT | Performed by: NURSE PRACTITIONER

## 2019-07-30 PROCEDURE — 94729 DIFFUSING CAPACITY: CPT | Performed by: NURSE PRACTITIONER

## 2019-07-30 PROCEDURE — 94618 PULMONARY STRESS TESTING: CPT | Performed by: NURSE PRACTITIONER

## 2019-07-30 PROCEDURE — 99214 OFFICE O/P EST MOD 30 MIN: CPT | Performed by: NURSE PRACTITIONER

## 2019-07-30 RX ORDER — IPRATROPIUM BROMIDE AND ALBUTEROL SULFATE 2.5; .5 MG/3ML; MG/3ML
3 SOLUTION RESPIRATORY (INHALATION) 4 TIMES DAILY PRN
Qty: 360 ML | Refills: 5 | Status: SHIPPED | OUTPATIENT
Start: 2019-07-30 | End: 2020-03-09

## 2019-07-30 RX ORDER — PREDNISONE 10 MG/1
TABLET ORAL
Qty: 30 TABLET | Refills: 0 | Status: SHIPPED | OUTPATIENT
Start: 2019-07-30 | End: 2019-09-03

## 2019-07-30 RX ORDER — AZITHROMYCIN 250 MG/1
TABLET, FILM COATED ORAL
Qty: 6 TABLET | Refills: 0 | Status: SHIPPED | OUTPATIENT
Start: 2019-07-30 | End: 2019-09-03

## 2019-07-30 RX ORDER — AZITHROMYCIN 250 MG/1
TABLET, FILM COATED ORAL
Qty: 12 TABLET | Refills: 6 | Status: SHIPPED | OUTPATIENT
Start: 2019-07-30 | End: 2019-09-03

## 2019-07-30 NOTE — PROGRESS NOTES
[facility] Pulmonary Follow up    CHIEF COMPLAINT    Severe COPD    HISTORY OF PRESENT ILLNESS    Nolvia Huff is a 59 y.o.female here today for follow-up of her severe COPD.  She was last seen in the office in January by me.  She states that she is done fairly well since her last appointment.  She has not been on any antibiotics or steroids since her last appointment.  She has some extra prednisone on hand at home and will occasionally take this as needed for worsening shortness of breath.    She has noticed over the last 2 weeks that her breathing has worsened.  She has had increased chest tightness and shortness of breath.  She states that she has been using her albuterol nebulizers more frequently over the last 2 weeks as well.  She continues to use Breo daily but states that this is not as effective as her Dulera.  She is also noticed some skin discoloration since being on the Brio.  She would like to go back to Dulera if possible.  She had to switch due to coverage from her insurance company.  She has an albuterol rescue inhaler that she will use as needed for shortness of breath as well.    She denies fever, chills, sputum production, hemoptysis, night sweats, weight loss, chest pain or palpitations.  She states that her secretions have been thick and clear in consistency.  She denies any lower extremity edema.  She has an occasional reflux symptoms and will take over-the-counter medication as needed.  She denies any sinus or allergy symptoms.  She denies any sinus pressure.    She continues to wear 2 L nasal cannula at nighttime.  She has used her oxygen during the day occasionally when her shortness of breath is severe.    She does admit to smoking marijuana daily.  She has a 60 pack year history and quit in 2004.  Her last CT scan was in March 2019 which revealed no new lung nodules or masses.    Patient Active Problem List   Diagnosis   • Dependence on supplemental oxygen   • Tobacco abuse   • Nocturnal  hypoxemia   • Anxiety disorder   • Dyslipidemia   • Emphysema/COPD (CMS/[facility])   • GERD (gastroesophageal reflux disease)   • Osteoporosis   • Personal history of tobacco use, presenting hazards to health   • COPD exacerbation (CMS/[facility])       Allergies   Allergen Reactions   • Penicillins        Current Outpatient Medications:   •  albuterol (PROVENTIL) (2.5 MG/3ML) 0.083% nebulizer solution, Take 2.5 mg by nebulization 4 (Four) Times a Day As Needed for Wheezing., Disp: 120 vial, Rfl: 5  •  albuterol sulfate  (90 Base) MCG/ACT inhaler, Inhale 2 puffs Every 4 (Four) Hours As Needed for Wheezing., Disp: 18 g, Rfl: 11  •  benzonatate (TESSALON) 100 MG capsule, Take  by mouth., Disp: , Rfl:   •  Fluticasone Furoate-Vilanterol (BREO ELLIPTA) 200-25 MCG/INH inhaler, Inhale 1 puff Daily., Disp: 1 inhaler, Rfl: 3  •  guaiFENesin (MUCINEX) 600 MG 12 hr tablet, Take  by mouth Every 12 (Twelve) Hours., Disp: , Rfl:   •  montelukast (SINGULAIR) 10 MG tablet, Take 1 tablet by mouth Every Night., Disp: 30 tablet, Rfl: 11  •  omeprazole (priLOSEC) 40 MG capsule, Take 1 capsule by mouth Daily., Disp: 30 capsule, Rfl: 11  •  predniSONE (DELTASONE) 10 MG tablet, Take 4 tabs daily x 3 days, then take 3 tabs daily x 3 days, then take 2 tabs daily x 3 days, then take 1 tab daily x 3 days, Disp: 30 tablet, Rfl: 0  •  tiotropium (SPIRIVA HANDIHALER) 18 MCG per inhalation capsule, Place 1 capsule into inhaler and inhale Daily., Disp: 90 capsule, Rfl: 1  •  triamcinolone (KENALOG) 0.1 % cream, APPLY SPARINGLY AND MASSAGE IN TWICE DAILY, Disp: 45 g, Rfl: 0  •  azithromycin (ZITHROMAX) 250 MG tablet, Take 2 by mouth today then 1 daily for 4 days, Disp: 6 tablet, Rfl: 0  •  azithromycin (ZITHROMAX) 250 MG tablet, Take 1 tablet every Monday, Wednesday, Friday., Disp: 12 tablet, Rfl: 6  •  ipratropium-albuterol (DUO-NEB) 0.5-2.5 mg/3 ml nebulizer, Take 3 mL by nebulization 4 (Four) Times a Day As Needed for Wheezing., Disp: 360 mL, Rfl:  "5    Current Facility-Administered Medications:   •  albuterol (PROVENTIL HFA;VENTOLIN HFA) inhaler 2 puff, 2 puff, Inhalation, PRN, Samreen Sandra APRN  MEDICATION LIST AND ALLERGIES REVIEWED.    Social History     Tobacco Use   • Smoking status: Former Smoker     Packs/day: 2.00     Years: 30.00     Pack years: 60.00     Types: Cigarettes     Last attempt to quit: 2004     Years since quitting: 15.5   • Smokeless tobacco: Never Used   • Tobacco comment: fomer smoker 2 ppd x 30 years-Still smoke 1 cig, sporadically   Substance Use Topics   • Alcohol use: Yes     Comment: 1/2 drinks per year   • Drug use: Yes     Types: Marijuana     Comment: occasionally smoked marijuana       FAMILY AND SOCIAL HISTORY REVIEWED.    Review of Systems   Constitutional: Positive for activity change and fatigue. Negative for appetite change, fever and unexpected weight change.   HENT: Negative for congestion, postnasal drip, rhinorrhea, sinus pressure, sore throat and voice change.    Eyes: Negative for visual disturbance.   Respiratory: Positive for cough, chest tightness, shortness of breath and wheezing.    Cardiovascular: Negative for chest pain, palpitations and leg swelling.   Gastrointestinal: Negative for abdominal distention, abdominal pain, nausea and vomiting.   Endocrine: Negative for cold intolerance and heat intolerance.   Genitourinary: Negative for difficulty urinating and urgency.   Musculoskeletal: Negative for arthralgias, back pain and neck pain.   Skin: Negative for color change and pallor.   Allergic/Immunologic: Negative for environmental allergies and food allergies.   Neurological: Negative for dizziness, syncope, weakness and light-headedness.   Hematological: Negative for adenopathy. Does not bruise/bleed easily.   Psychiatric/Behavioral: Negative for agitation and behavioral problems.   .    /70   Pulse 54   Temp 96.2 °F (35.7 °C)   Ht 167.6 cm (66\")   Wt 65.8 kg (145 lb)   LMP  (LMP Unknown)  "  SpO2 93% Comment: 3L pulse dose, resting  Breastfeeding? No   BMI 23.40 kg/m²     Immunization History   Administered Date(s) Administered   • Flu Vaccine Quad PF >36MO 01/30/2019   • Influenza, Unspecified 10/04/2016       Physical Exam   Constitutional: She is oriented to person, place, and time. She appears well-developed and well-nourished.   HENT:   Head: Normocephalic and atraumatic.   Eyes: Pupils are equal, round, and reactive to light.   Neck: Normal range of motion. Neck supple. No thyromegaly present.   Cardiovascular: Normal rate, regular rhythm, normal heart sounds and intact distal pulses. Exam reveals no gallop and no friction rub.   No murmur heard.  Pulmonary/Chest: Effort normal and breath sounds normal. No respiratory distress. She has no wheezes. She has no rales. She exhibits no tenderness.   Abdominal: Soft. Bowel sounds are normal. There is no tenderness.   Musculoskeletal: Normal range of motion.   Lymphadenopathy:     She has no cervical adenopathy.   Neurological: She is alert and oriented to person, place, and time.   Skin: Skin is warm and dry. Capillary refill takes less than 2 seconds. She is not diaphoretic.   Psychiatric: She has a normal mood and affect. Her behavior is normal.   Nursing note and vitals reviewed.        RESULTS    PFTS in the office today, read by me.  FVC 1.81 51% predicted, FEV1 0.78 29% predicted, FEV1/FVC 43% predicted, TLC 3.83 72% predicted, DLCO 49% predicted, severe obstruction, mild restriction and severely reduced DLCO.    6-minute walk test, patient ambulated 400 feet her oxygenation was 87% at the beginning of the test she was placed on 2 L pulse dose and desaturated to 88% she was placed on 3 L pulse dose and recovered to 93%.    PROBLEM LIST    Problem List Items Addressed This Visit        Respiratory    Dependence on supplemental oxygen    Emphysema/COPD (CMS/[facility])    Overview      Emphysema/COPD (492.8)   · A.  Moderate to severe obstruction,  former tobacco abuse.B.  Controlled with the      azithromycin every Monday Wednesday Friday.      B. 07/06/15 Spirometry reveals an increase in obstruction, FEV1 now 42% instead of 53%. Minute ventilation reduced. Forced vital capacity is reduced suggesting a restrictive pattern.          Relevant Medications    predniSONE (DELTASONE) 10 MG tablet    azithromycin (ZITHROMAX) 250 MG tablet    ipratropium-albuterol (DUO-NEB) 0.5-2.5 mg/3 ml nebulizer    Other Relevant Orders    Pulmonary Function Test (Completed)    Walking Oximetry (Completed)    COPD exacerbation (CMS/[facility]) - Primary    Relevant Medications    predniSONE (DELTASONE) 10 MG tablet    azithromycin (ZITHROMAX) 250 MG tablet    ipratropium-albuterol (DUO-NEB) 0.5-2.5 mg/3 ml nebulizer       Digestive    GERD (gastroesophageal reflux disease)       Other    Personal history of tobacco use, presenting hazards to health            DISCUSSION    Ms. Huff was here today for her six-month follow-up of her COPD.  She does appear to be acutely ill and having a COPD exacerbation.  She did not appear to be in any respiratory distress today.  I will place her on azithromycin and prednisone for this.  I will call these in today.  I am also going to switch her from albuterol nebulizers to DuoNeb's 4 times a day.  I will call this into her local pharmacy as well.  I did advise her that she did not need to do her albuterol nebulizers anymore.  I did provide her with some samples of DuoNeb's in the office today as well.  I am also start her on azithromycin Monday, Wednesday and Friday for her COPD.  She will start this next week after she is completed her Z-Abraham.    Based on her 6-minute walk test today in the office she does qualify for 3 L pulse dose with activity.  I am going to order her a POC in order her oxygen during the day with activity.  She states that she is going directly home after this office visit.  Her current [employer] company is [employer].  She will  continue to wear her oxygen at night for her dependence on supplemental oxygen.    She will resume taking Dulera 200.  She has failed therapy with Breo.  I did advise her to see her PCP for her skin discoloration.  I do not suspect that the Brio has caused this skin discoloration.  I will send in a PA for her Dulera for her insurance to cover this.    Based on her smoking history of 60 pack and quitting in 2004 this year was her last year to be followed for a CT low-dose screening.  I did advise her that her most recent CT scan did not reveal any new lung nodules or masses.  Did advise her that if she had any new symptoms that we would scan her her chest at that time.    She will follow-up in 6 months or sooner if her symptoms worsen.  She will call with any additional concerns or questions.  I spent 25 minutes with the patient. I spent > 50% percent of this time counseling and discussing diagnosis, prognosis, diagnostic testing, evaluation, current status, treatment options and management.    Maryellen Higuera, APRN  07/30/201912:38 PM  Electronically signed     Please note that portions of this note were completed with a voice recognition program. Efforts were made to edit the dictations, but occasionally words are mistranscribed.      CC: Alfonso Denney MD

## 2019-07-31 ENCOUNTER — TELEPHONE (OUTPATIENT)
Dept: PULMONOLOGY | Facility: CLINIC | Age: 59
End: 2019-07-31

## 2019-07-31 DIAGNOSIS — J43.9 PULMONARY EMPHYSEMA, UNSPECIFIED EMPHYSEMA TYPE (HCC): Primary | ICD-10-CM

## 2019-07-31 DIAGNOSIS — F17.210 CIGARETTE SMOKER: Primary | ICD-10-CM

## 2019-07-31 RX ORDER — FLUTICASONE PROPIONATE AND SALMETEROL 232; 14 UG/1; UG/1
1 POWDER, METERED RESPIRATORY (INHALATION) 2 TIMES DAILY
Qty: 1 EACH | Refills: 0 | Status: SHIPPED | OUTPATIENT
Start: 2019-07-31 | End: 2019-11-08

## 2019-07-31 NOTE — TELEPHONE ENCOUNTER
Everett ospina denial for Dulera they will cover Lizzy Bojorquez who sent new order for patient to try if doesn't work will do new pa for approval of Dulera

## 2019-09-03 ENCOUNTER — OFFICE VISIT (OUTPATIENT)
Dept: INTERNAL MEDICINE | Facility: CLINIC | Age: 59
End: 2019-09-03

## 2019-09-03 VITALS
DIASTOLIC BLOOD PRESSURE: 60 MMHG | OXYGEN SATURATION: 94 % | SYSTOLIC BLOOD PRESSURE: 100 MMHG | TEMPERATURE: 98.2 F | RESPIRATION RATE: 20 BRPM | HEIGHT: 65 IN | HEART RATE: 85 BPM | BODY MASS INDEX: 24.21 KG/M2 | WEIGHT: 145.31 LBS

## 2019-09-03 DIAGNOSIS — J44.9 CHRONIC OBSTRUCTIVE PULMONARY DISEASE, UNSPECIFIED COPD TYPE (HCC): ICD-10-CM

## 2019-09-03 DIAGNOSIS — Z12.39 BREAST CANCER SCREENING: ICD-10-CM

## 2019-09-03 DIAGNOSIS — Z13.220 LIPID SCREENING: ICD-10-CM

## 2019-09-03 DIAGNOSIS — Z00.00 WELL ADULT EXAM: Primary | ICD-10-CM

## 2019-09-03 DIAGNOSIS — L80 VITILIGO: ICD-10-CM

## 2019-09-03 PROBLEM — K21.9 GASTROESOPHAGEAL REFLUX DISEASE: Status: ACTIVE | Noted: 2019-09-03

## 2019-09-03 PROBLEM — J43.9 PULMONARY EMPHYSEMA (HCC): Status: ACTIVE | Noted: 2019-09-03

## 2019-09-03 PROBLEM — F41.9 ANXIETY DISORDER: Status: ACTIVE | Noted: 2019-09-03

## 2019-09-03 PROBLEM — M81.0 OSTEOPOROSIS: Status: ACTIVE | Noted: 2019-09-03

## 2019-09-03 PROBLEM — R09.02 HYPOXIA: Status: ACTIVE | Noted: 2019-09-03

## 2019-09-03 PROBLEM — E78.5 DYSLIPIDEMIA: Status: ACTIVE | Noted: 2019-09-03

## 2019-09-03 LAB
ALBUMIN SERPL-MCNC: 4.4 G/DL (ref 3.5–5.2)
ALBUMIN/GLOB SERPL: 1.6 G/DL
ALP SERPL-CCNC: 66 U/L (ref 39–117)
ALT SERPL W P-5'-P-CCNC: 19 U/L (ref 1–33)
ANION GAP SERPL CALCULATED.3IONS-SCNC: 8.8 MMOL/L (ref 5–15)
AST SERPL-CCNC: 17 U/L (ref 1–32)
BILIRUB SERPL-MCNC: 0.4 MG/DL (ref 0.2–1.2)
BUN BLD-MCNC: 7 MG/DL (ref 8–23)
BUN/CREAT SERPL: 10.3 (ref 7–25)
CALCIUM SPEC-SCNC: 9.5 MG/DL (ref 8.6–10.5)
CHLORIDE SERPL-SCNC: 97 MMOL/L (ref 98–107)
CHOLEST SERPL-MCNC: 198 MG/DL (ref 0–200)
CO2 SERPL-SCNC: 31.2 MMOL/L (ref 22–29)
CREAT BLD-MCNC: 0.68 MG/DL (ref 0.57–1)
DEPRECATED RDW RBC AUTO: 42.5 FL (ref 37–54)
ERYTHROCYTE [DISTWIDTH] IN BLOOD BY AUTOMATED COUNT: 12.1 % (ref 12.3–15.4)
GFR SERPL CREATININE-BSD FRML MDRD: 88 ML/MIN/1.73
GLOBULIN UR ELPH-MCNC: 2.7 GM/DL
GLUCOSE BLD-MCNC: 95 MG/DL (ref 65–99)
HCT VFR BLD AUTO: 48.2 % (ref 34–46.6)
HDLC SERPL-MCNC: 54 MG/DL (ref 40–60)
HGB BLD-MCNC: 15.2 G/DL (ref 12–15.9)
LDLC SERPL CALC-MCNC: 129 MG/DL (ref 0–100)
LDLC/HDLC SERPL: 2.39 {RATIO}
MCH RBC QN AUTO: 30.2 PG (ref 26.6–33)
MCHC RBC AUTO-ENTMCNC: 31.5 G/DL (ref 31.5–35.7)
MCV RBC AUTO: 95.8 FL (ref 79–97)
PLATELET # BLD AUTO: 333 10*3/MM3 (ref 140–450)
PMV BLD AUTO: 10.7 FL (ref 6–12)
POTASSIUM BLD-SCNC: 4.3 MMOL/L (ref 3.5–5.2)
PROT SERPL-MCNC: 7.1 G/DL (ref 6–8.5)
RBC # BLD AUTO: 5.03 10*6/MM3 (ref 3.77–5.28)
SODIUM BLD-SCNC: 137 MMOL/L (ref 136–145)
T4 FREE SERPL-MCNC: 1.45 NG/DL (ref 0.93–1.7)
TRIGL SERPL-MCNC: 76 MG/DL (ref 0–150)
TSH SERPL DL<=0.05 MIU/L-ACNC: 2.95 UIU/ML (ref 0.27–4.2)
VLDLC SERPL-MCNC: 15.2 MG/DL (ref 5–40)
WBC NRBC COR # BLD: 7.72 10*3/MM3 (ref 3.4–10.8)

## 2019-09-03 PROCEDURE — 85027 COMPLETE CBC AUTOMATED: CPT | Performed by: INTERNAL MEDICINE

## 2019-09-03 PROCEDURE — 80061 LIPID PANEL: CPT | Performed by: INTERNAL MEDICINE

## 2019-09-03 PROCEDURE — 84443 ASSAY THYROID STIM HORMONE: CPT | Performed by: INTERNAL MEDICINE

## 2019-09-03 PROCEDURE — 84439 ASSAY OF FREE THYROXINE: CPT | Performed by: INTERNAL MEDICINE

## 2019-09-03 PROCEDURE — 99396 PREV VISIT EST AGE 40-64: CPT | Performed by: INTERNAL MEDICINE

## 2019-09-03 PROCEDURE — 80053 COMPREHEN METABOLIC PANEL: CPT | Performed by: INTERNAL MEDICINE

## 2019-09-03 NOTE — PROGRESS NOTES
Subjective   Nolvia Huff is a 59 y.o. female.     History of Present Illness     The following portions of the patient's history were reviewed and updated as appropriate: allergies, current medications, past family history, past medical history, past social history, past surgical history and problem list.        Complete Adult Physical    1 hypopigmented rash on leg and hands  Duration 4-5 months  Patient says that she noticed the highlighted hypopigmented discoloration in the lower extremities in the legs and occasionally in the hands.  Patient denies any exposure to different chemicals, travel history, no other systemic symptoms.    2 COPD- patient is on daytime and nighttime oxygen.  Patient is continued on the inhalers and this has helped tremendously with her breathing and overall pretty management.          Diet: regular, well balanced          Exercise walking and does yard word        Social History +former smoker, quit in 2004, no EtOH consumption, no IV drug use, no marijuana, no illicit drugs, no other systemic signs.      Family history:  Arthritis  Asthma  Breast cancer- mother  Thyroid disease-        Preventative Screenings  Pap smear-hysterectomy in 2004 for   Colonoscopy- last    Mammogram- .4-5 years Normal           Immunizations: Needs pneumococcal      Review of Systems   All other systems reviewed and are negative.      Objective   Physical Exam   Constitutional: She is oriented to person, place, and time. She appears well-developed and well-nourished.   HENT:   Head: Normocephalic.   Right Ear: External ear normal.   Left Ear: External ear normal.   Nose: Nose normal.   Mouth/Throat: Oropharynx is clear and moist.   Eyes: Conjunctivae and EOM are normal. Pupils are equal, round, and reactive to light.   Neck: Normal range of motion. Neck supple.   Cardiovascular: Normal rate, regular rhythm and normal heart sounds.   Pulmonary/Chest: Effort normal and breath sounds normal.   Abdominal:  Soft. Bowel sounds are normal.   Musculoskeletal: Normal range of motion.   Neurological: She is alert and oriented to person, place, and time.   Skin: Skin is warm.   Hypopigmented region-macular rash in the lower extremities and legs   Psychiatric: She has a normal mood and affect. Her behavior is normal. Judgment and thought content normal.   Nursing note and vitals reviewed.        Assessment/Plan   Nolvia was seen today for annual exam.    Diagnoses and all orders for this visit:    Well adult exam    Chronic obstructive pulmonary disease, unspecified COPD type (CMS/HCC)-continue with current inhaler therapy and oxygen therapy.    Lipid screening    Breast cancer screening  -     Mammo screening digital tomosynthesis bilateral w CAD; Future    Vitiligo- provided reassurance to patient about the management, evaluation, and treatment for vitiligo.  If further evaluation needed then I will refer to dermatology for further recommendations.    Anticipatory guidance:  Recommend routine ophthalmology visit.  Recommend routine dental visit.    Labs include the following: CMP, CBC, TSH, free T4, lipid profile

## 2019-11-01 ENCOUNTER — APPOINTMENT (OUTPATIENT)
Dept: MAMMOGRAPHY | Facility: HOSPITAL | Age: 59
End: 2019-11-01

## 2020-01-09 RX ORDER — TIOTROPIUM BROMIDE 18 UG/1
CAPSULE ORAL; RESPIRATORY (INHALATION)
Qty: 90 CAPSULE | Refills: 1 | Status: SHIPPED | OUTPATIENT
Start: 2020-01-09 | End: 2020-03-09 | Stop reason: SDUPTHER

## 2020-01-09 RX ORDER — TRIAMCINOLONE ACETONIDE 1 MG/G
CREAM TOPICAL
Qty: 45 G | Refills: 0 | Status: SHIPPED | OUTPATIENT
Start: 2020-01-09 | End: 2021-01-04

## 2020-01-09 RX ORDER — PREDNISONE 10 MG/1
TABLET ORAL
Qty: 30 TABLET | Refills: 0 | Status: SHIPPED | OUTPATIENT
Start: 2020-01-09 | End: 2020-03-04

## 2020-01-15 ENCOUNTER — HOSPITAL ENCOUNTER (OUTPATIENT)
Dept: MAMMOGRAPHY | Facility: HOSPITAL | Age: 60
Discharge: HOME | End: 2020-01-15
Admitting: INTERNAL MEDICINE

## 2020-01-15 DIAGNOSIS — Z12.39 BREAST CANCER SCREENING: ICD-10-CM

## 2020-01-15 PROCEDURE — 77063 BREAST TOMOSYNTHESIS BI: CPT

## 2020-01-15 PROCEDURE — 77067 SCR MAMMO BI INCL CAD: CPT | Performed by: RADIOLOGY

## 2020-01-15 PROCEDURE — 77063 BREAST TOMOSYNTHESIS BI: CPT | Performed by: RADIOLOGY

## 2020-01-15 PROCEDURE — 77067 SCR MAMMO BI INCL CAD: CPT

## 2020-03-04 ENCOUNTER — OFFICE VISIT (OUTPATIENT)
Dept: INTERNAL MEDICINE | Facility: CLINIC | Age: 60
End: 2020-03-04

## 2020-03-04 VITALS
TEMPERATURE: 98.3 F | OXYGEN SATURATION: 92 % | WEIGHT: 155 LBS | SYSTOLIC BLOOD PRESSURE: 118 MMHG | HEART RATE: 78 BPM | RESPIRATION RATE: 20 BRPM | BODY MASS INDEX: 25.99 KG/M2 | DIASTOLIC BLOOD PRESSURE: 80 MMHG

## 2020-03-04 DIAGNOSIS — J44.9 CHRONIC OBSTRUCTIVE PULMONARY DISEASE, UNSPECIFIED COPD TYPE (HCC): ICD-10-CM

## 2020-03-04 DIAGNOSIS — Z12.11 COLON CANCER SCREENING: ICD-10-CM

## 2020-03-04 DIAGNOSIS — R30.0 DYSURIA: Primary | ICD-10-CM

## 2020-03-04 DIAGNOSIS — Z13.820 OSTEOPOROSIS SCREENING: ICD-10-CM

## 2020-03-04 LAB
BILIRUB BLD-MCNC: NEGATIVE MG/DL
CLARITY, POC: CLEAR
COLOR UR: YELLOW
EXPIRATION DATE: NORMAL
GLUCOSE UR STRIP-MCNC: NEGATIVE MG/DL
KETONES UR QL: NEGATIVE
LEUKOCYTE EST, POC: NEGATIVE
Lab: NORMAL
NITRITE UR-MCNC: NEGATIVE MG/ML
PH UR: 8 [PH] (ref 5–8)
PROT UR STRIP-MCNC: NEGATIVE MG/DL
RBC # UR STRIP: NEGATIVE /UL
SP GR UR: 1.02 (ref 1–1.03)
UROBILINOGEN UR QL: NORMAL

## 2020-03-04 PROCEDURE — 99213 OFFICE O/P EST LOW 20 MIN: CPT | Performed by: INTERNAL MEDICINE

## 2020-03-04 NOTE — PROGRESS NOTES
Subjective   Nolvia Huff is a 60 y.o. female.     History of Present Illness     The following portions of the patient's history were reviewed and updated as appropriate: allergies, current medications, past family history, past medical history, past social history, past surgical history and problem list.    1 uti symptoms ( new issue)  Duration 1 week  Symptoms: Patient says that she is been having mild dysuria, urgency but has been drinking more fluids, certain juices and her symptoms have decreased no fever, no chills no nausea no vomiting or diarrhea, no other symptoms    COPD-chronic stable.  Patient continues to her inhalers and has not had any symptoms of any shortness breath, chest pain, nausea,, headache, fatigue, or any other systemic such.    Health Maint    Colonoscopy??  Bone density       Review of Systems   All other systems reviewed and are negative.      Objective   Physical Exam   Constitutional: She is oriented to person, place, and time. She appears well-developed and well-nourished.   HENT:   Head: Normocephalic.   Nose: Nose normal.   Mouth/Throat: Oropharynx is clear and moist.   Eyes: Pupils are equal, round, and reactive to light. Conjunctivae and EOM are normal.   Neck: Normal range of motion. Neck supple.   Pulmonary/Chest: Effort normal and breath sounds normal.   Musculoskeletal: Normal range of motion.   Neurological: She is alert and oriented to person, place, and time.   Nursing note and vitals reviewed.        Assessment/Plan   Nolvia was seen today for follow-up.    Diagnoses and all orders for this visit:    Dysuria  -     POC Urinalysis Dipstick, Automated    Chronic obstructive pulmonary disease, unspecified COPD type (CMS/HCC)-continue on current inhaler therapy.    Osteoporosis screening  -     DEXA Bone Density Axial    Colon cancer screening  -     Cologuard - Stool, Per Rectum; Future

## 2020-03-06 ENCOUNTER — TELEPHONE (OUTPATIENT)
Dept: INTERNAL MEDICINE | Facility: CLINIC | Age: 60
End: 2020-03-06

## 2020-03-06 DIAGNOSIS — J44.9 CHRONIC OBSTRUCTIVE PULMONARY DISEASE, UNSPECIFIED COPD TYPE (HCC): Primary | ICD-10-CM

## 2020-03-06 NOTE — TELEPHONE ENCOUNTER
Recd call from April at [phone]-needs new referral for pt diag-copd exacerbation, emphysema, tobacco use, supplemental oxygen and reflux disease.  Pt is already gema-they just need epic referral placed and routed for ins

## 2020-03-09 ENCOUNTER — OFFICE VISIT (OUTPATIENT)
Dept: PULMONOLOGY | Facility: CLINIC | Age: 60
End: 2020-03-09

## 2020-03-09 VITALS
SYSTOLIC BLOOD PRESSURE: 120 MMHG | DIASTOLIC BLOOD PRESSURE: 74 MMHG | HEART RATE: 83 BPM | WEIGHT: 153.6 LBS | OXYGEN SATURATION: 93 % | BODY MASS INDEX: 25.59 KG/M2 | TEMPERATURE: 97.6 F | HEIGHT: 65 IN

## 2020-03-09 DIAGNOSIS — K21.9 GASTROESOPHAGEAL REFLUX DISEASE WITHOUT ESOPHAGITIS: ICD-10-CM

## 2020-03-09 DIAGNOSIS — G47.34 NOCTURNAL HYPOXEMIA: ICD-10-CM

## 2020-03-09 DIAGNOSIS — J44.1 COPD EXACERBATION (HCC): ICD-10-CM

## 2020-03-09 DIAGNOSIS — Z87.891 PERSONAL HISTORY OF TOBACCO USE, PRESENTING HAZARDS TO HEALTH: ICD-10-CM

## 2020-03-09 DIAGNOSIS — J43.2 CENTRILOBULAR EMPHYSEMA (HCC): Primary | ICD-10-CM

## 2020-03-09 PROCEDURE — 99214 OFFICE O/P EST MOD 30 MIN: CPT | Performed by: NURSE PRACTITIONER

## 2020-03-09 RX ORDER — AZITHROMYCIN 250 MG/1
TABLET, FILM COATED ORAL
Qty: 30 TABLET | Refills: 2 | Status: SHIPPED | OUTPATIENT
Start: 2020-03-09 | End: 2020-07-07

## 2020-03-09 RX ORDER — ALBUTEROL SULFATE 2.5 MG/3ML
2.5 SOLUTION RESPIRATORY (INHALATION) 4 TIMES DAILY PRN
Qty: 120 VIAL | Refills: 5 | Status: SHIPPED | OUTPATIENT
Start: 2020-03-09 | End: 2020-07-07 | Stop reason: SDUPTHER

## 2020-03-09 RX ORDER — GUAIFENESIN 600 MG/1
600 TABLET, EXTENDED RELEASE ORAL 2 TIMES DAILY
Qty: 60 TABLET | Refills: 6 | Status: SHIPPED | OUTPATIENT
Start: 2020-03-09 | End: 2022-09-27 | Stop reason: SDUPTHER

## 2020-03-09 NOTE — PROGRESS NOTES
[facility] Pulmonary Follow up    CHIEF COMPLAINT    COPD    HISTORY OF PRESENT ILLNESS    Nolvia Huff is a 60 y.o.female here today for follow-up of her COPD.  She was last seen in the office by me in July.  She states that she has had 1 round of steroids 1 month ago for COPD exacerbation.  She states that she is not been on any antibiotics since her last appointment.    She continues to use 2 L nasal cannula at nighttime and wears her oxygen during the day as needed.  She does have some shortness of breath with activity but does recover quickly at rest.  She states that her breathing does change in the summertime.  She states that the humidity makes her breathing worse.      She continues to use Breo daily for her COPD.  She had been on Dulera but her insurance quit covering this medication.  She continues to use albuterol nebulizers or her rescue inhaler as needed for shortness of breath.  I had placed her on azithromycin every Monday, Wednesday and Friday but unfortunately her pharmacy refused to fill this monthly and she quit taking the antibiotic.    She denies fever, chills, sputum production, hemoptysis, night sweats, weight loss, chest pain or palpitations.  She denies any lower extremity edema.  She denies any sinus or allergy symptoms currently.  She denies reflux symptoms.    She quit smoking in 2004 and has a 60-pack-year history.  Her last CT scan was in March 2019.  She has a CT scan scheduled later this month.    Patient Active Problem List   Diagnosis   • Dependence on supplemental oxygen   • Tobacco abuse   • Nocturnal hypoxemia   • Anxiety disorder   • Dyslipidemia   • Emphysema/COPD (CMS/HCC)   • GERD (gastroesophageal reflux disease)   • Osteoporosis   • Personal history of tobacco use, presenting hazards to health   • COPD exacerbation (CMS/HCC)   • Osteoporosis   • Hypoxia   • Gastroesophageal reflux disease   • Pulmonary emphysema (CMS/HCC)   • Dyslipidemia   • Anxiety disorder        Allergies   Allergen Reactions   • Penicillins        Current Outpatient Medications:   •  albuterol sulfate  (90 Base) MCG/ACT inhaler, Inhale 2 puffs Every 4 (Four) Hours As Needed for Wheezing., Disp: 18 g, Rfl: 11  •  Fluticasone Furoate-Vilanterol (BREO ELLIPTA) 200-25 MCG/INH inhaler, Inhale 1 puff Daily., Disp: 60 each, Rfl: 6  •  guaiFENesin (MUCINEX) 600 MG 12 hr tablet, Take 1 tablet by mouth 2 (Two) Times a Day., Disp: 60 tablet, Rfl: 6  •  montelukast (SINGULAIR) 10 MG tablet, Take 1 tablet by mouth Every Night., Disp: 30 tablet, Rfl: 11  •  omeprazole (priLOSEC) 40 MG capsule, Take 1 capsule by mouth Daily., Disp: 30 capsule, Rfl: 11  •  tiotropium (SPIRIVA HANDIHALER) 18 MCG per inhalation capsule, Place 1 capsule into inhaler and inhale Daily., Disp: 90 capsule, Rfl: 3  •  triamcinolone (KENALOG) 0.1 % cream, APPLY SPARINGLY AND MASSAGE IN TWICE DAILY, Disp: 45 g, Rfl: 0  •  albuterol (PROVENTIL) (2.5 MG/3ML) 0.083% nebulizer solution, Take 2.5 mg by nebulization 4 (Four) Times a Day As Needed for Wheezing., Disp: 120 vial, Rfl: 5  •  azithromycin (ZITHROMAX) 250 MG tablet, Take 2 tablets the first day, then 1 tablet daily for 4 days., Disp: 30 tablet, Rfl: 2    Current Facility-Administered Medications:   •  albuterol (PROVENTIL HFA;VENTOLIN HFA) inhaler 2 puff, 2 puff, Inhalation, PRN, Bruins, Samreen A, APRN  MEDICATION LIST AND ALLERGIES REVIEWED.    Social History     Tobacco Use   • Smoking status: Former Smoker     Packs/day: 2.00     Years: 30.00     Pack years: 60.00     Types: Cigarettes     Last attempt to quit: 2004     Years since quittin.1   • Smokeless tobacco: Never Used   • Tobacco comment: fomer smoker 2 ppd x 30 years-Still smoke 1 cig, sporadically   Substance Use Topics   • Alcohol use: Yes     Comment: 1/2 drinks per year   • Drug use: Yes     Types: Marijuana     Comment: occasionally smoked marijuana       FAMILY AND SOCIAL HISTORY REVIEWED.    Review of  "Systems   Constitutional: Negative for activity change, appetite change, fatigue, fever and unexpected weight change.   HENT: Negative for congestion, postnasal drip, rhinorrhea, sinus pressure, sore throat and voice change.    Eyes: Negative for visual disturbance.   Respiratory: Negative for cough, chest tightness, shortness of breath and wheezing.    Cardiovascular: Negative for chest pain, palpitations and leg swelling.   Gastrointestinal: Negative for abdominal distention, abdominal pain, nausea and vomiting.   Endocrine: Negative for cold intolerance and heat intolerance.   Genitourinary: Negative for difficulty urinating and urgency.   Musculoskeletal: Negative for arthralgias, back pain and neck pain.   Skin: Negative for color change and pallor.   Allergic/Immunologic: Negative for environmental allergies and food allergies.   Neurological: Negative for dizziness, syncope, weakness and light-headedness.   Hematological: Negative for adenopathy. Does not bruise/bleed easily.   Psychiatric/Behavioral: Negative for agitation and behavioral problems.   .    /74 (BP Location: Left arm, Patient Position: Sitting, Cuff Size: Adult)   Pulse 83   Temp 97.6 °F (36.4 °C) (Temporal)   Ht 164.3 cm (64.7\")   Wt 69.7 kg (153 lb 9.6 oz)   LMP  (LMP Unknown)   SpO2 93% Comment: room air seated  BMI 25.80 kg/m²     Immunization History   Administered Date(s) Administered   • Flu Vaccine Quad PF >36MO 01/30/2019   • Influenza, Unspecified 10/04/2016       Physical Exam   Constitutional: She is oriented to person, place, and time. She appears well-developed and well-nourished.   HENT:   Head: Normocephalic and atraumatic.   Eyes: Pupils are equal, round, and reactive to light.   Neck: Normal range of motion. Neck supple. No thyromegaly present.   Cardiovascular: Normal rate, regular rhythm, normal heart sounds and intact distal pulses. Exam reveals no gallop and no friction rub.   No murmur " heard.  Pulmonary/Chest: Effort normal and breath sounds normal. No respiratory distress. She has no wheezes. She has no rales. She exhibits no tenderness.   Abdominal: Soft. Bowel sounds are normal. There is no tenderness.   Musculoskeletal: Normal range of motion. She exhibits no edema.   Lymphadenopathy:     She has no cervical adenopathy.   Neurological: She is alert and oriented to person, place, and time.   Skin: Skin is warm and dry. Capillary refill takes less than 2 seconds. She is not diaphoretic.   Psychiatric: She has a normal mood and affect. Her behavior is normal.   Nursing note and vitals reviewed.        RESULTS      PROBLEM LIST    Problem List Items Addressed This Visit        Respiratory    Nocturnal hypoxemia    Overview     . Nocturnal hypoxemia (327.24)   · A.  Nocturnal hypoxemia her overnight study of 06/15/2014, O2 at 2 L per minute each      bedtime.           Emphysema/COPD (CMS/[facility]) - Primary    Overview      Emphysema/COPD (492.8)   · A.  Moderate to severe obstruction, former tobacco abuse.B.  Controlled with the      azithromycin every Monday Wednesday Friday.      B. 07/06/15 Spirometry reveals an increase in obstruction, FEV1 now 42% instead of 53%. Minute ventilation reduced. Forced vital capacity is reduced suggesting a restrictive pattern.          Relevant Medications    azithromycin (ZITHROMAX) 250 MG tablet    Fluticasone Furoate-Vilanterol (BREO ELLIPTA) 200-25 MCG/INH inhaler    guaiFENesin (MUCINEX) 600 MG 12 hr tablet    tiotropium (SPIRIVA HANDIHALER) 18 MCG per inhalation capsule    albuterol (PROVENTIL) (2.5 MG/3ML) 0.083% nebulizer solution    COPD exacerbation (CMS/[facility])    Relevant Medications    Fluticasone Furoate-Vilanterol (BREO ELLIPTA) 200-25 MCG/INH inhaler    guaiFENesin (MUCINEX) 600 MG 12 hr tablet    tiotropium (SPIRIVA HANDIHALER) 18 MCG per inhalation capsule    albuterol (PROVENTIL) (2.5 MG/3ML) 0.083% nebulizer solution       Digestive    GERD  (gastroesophageal reflux disease)       Other    Personal history of tobacco use, presenting hazards to health            DISCUSSION    Ms. Huff was here for follow-up of her COPD.  She will continue Breo and Spiriva daily for her COPD.  I did send in refills for these today.  She will continue to use albuterol nebulizers or her rescue inhaler as needed for shortness of breath.  I also sent in refills of her albuterol.    I am not start her on azithromycin daily for her severe COPD.  Hopefully this will reduce her exacerbations.  She states that the 3-day a week her pharmacy will not fill.  Hopefully she will get this filled with her pharmacy.    She will continue to wear her oxygen at 2 L nasal cannula at nighttime and 3 L pulse dose with activity.  She denies any daytime somnolence or fatigue.    She has a CT scan scheduled for later this month for lung cancer screening.  I will call her once I receive the results of this testing.    She will follow-up in 6 months or sooner if her symptoms worsen.  She will call with any additional concerns or questions.  I spent 25 minutes with the patient. I spent > 50% percent of this time counseling and discussing diagnosis, prognosis, diagnostic testing, evaluation, current status, treatment options and management.    Maryellen Higuera, APRN  03/09/20209:32 AM  Electronically signed     Please note that portions of this note were completed with a voice recognition program. Efforts were made to edit the dictations, but occasionally words are mistranscribed.      CC: Alfonso Denney MD

## 2020-03-19 DIAGNOSIS — Z13.820 OSTEOPOROSIS SCREENING: Primary | ICD-10-CM

## 2020-03-19 DIAGNOSIS — Z78.0 MENOPAUSE: ICD-10-CM

## 2020-03-20 ENCOUNTER — TELEPHONE (OUTPATIENT)
Dept: ULTRASOUND IMAGING | Facility: HOSPITAL | Age: 60
End: 2020-03-20

## 2020-03-23 ENCOUNTER — APPOINTMENT (OUTPATIENT)
Dept: CT IMAGING | Facility: HOSPITAL | Age: 60
End: 2020-03-23

## 2020-04-29 ENCOUNTER — TELEPHONE (OUTPATIENT)
Dept: INTERNAL MEDICINE | Facility: CLINIC | Age: 60
End: 2020-04-29

## 2020-04-29 NOTE — TELEPHONE ENCOUNTER
Pt called requesting blood type. Advised not checked in office, I can send message to PCP to order labs if possible. Pt declined. Good verbal understanding.

## 2020-05-11 ENCOUNTER — HOSPITAL ENCOUNTER (OUTPATIENT)
Dept: CT IMAGING | Facility: HOSPITAL | Age: 60
Discharge: HOME | End: 2020-05-11
Admitting: NURSE PRACTITIONER

## 2020-05-11 DIAGNOSIS — F17.210 CIGARETTE SMOKER: ICD-10-CM

## 2020-05-11 PROCEDURE — G0297 LDCT FOR LUNG CA SCREEN: HCPCS

## 2020-06-08 ENCOUNTER — TELEPHONE (OUTPATIENT)
Dept: PULMONOLOGY | Facility: CLINIC | Age: 60
End: 2020-06-08

## 2020-06-08 DIAGNOSIS — J44.1 COPD EXACERBATION (HCC): Primary | ICD-10-CM

## 2020-06-08 RX ORDER — MONTELUKAST SODIUM 10 MG/1
10 TABLET ORAL NIGHTLY
Qty: 30 TABLET | Refills: 11 | Status: SHIPPED | OUTPATIENT
Start: 2020-06-08 | End: 2021-07-06

## 2020-07-07 ENCOUNTER — OFFICE VISIT (OUTPATIENT)
Dept: PULMONOLOGY | Facility: CLINIC | Age: 60
End: 2020-07-07

## 2020-07-07 VITALS
WEIGHT: 152 LBS | HEIGHT: 65 IN | HEART RATE: 80 BPM | DIASTOLIC BLOOD PRESSURE: 74 MMHG | BODY MASS INDEX: 25.33 KG/M2 | OXYGEN SATURATION: 90 % | SYSTOLIC BLOOD PRESSURE: 122 MMHG | TEMPERATURE: 98.3 F

## 2020-07-07 DIAGNOSIS — Z87.891 PERSONAL HISTORY OF TOBACCO USE, PRESENTING HAZARDS TO HEALTH: ICD-10-CM

## 2020-07-07 DIAGNOSIS — Z99.81 DEPENDENCE ON SUPPLEMENTAL OXYGEN: ICD-10-CM

## 2020-07-07 DIAGNOSIS — J43.2 CENTRILOBULAR EMPHYSEMA (HCC): Primary | ICD-10-CM

## 2020-07-07 DIAGNOSIS — K21.9 GASTROESOPHAGEAL REFLUX DISEASE WITHOUT ESOPHAGITIS: ICD-10-CM

## 2020-07-07 PROCEDURE — 99214 OFFICE O/P EST MOD 30 MIN: CPT | Performed by: NURSE PRACTITIONER

## 2020-07-07 RX ORDER — ALBUTEROL SULFATE 2.5 MG/3ML
2.5 SOLUTION RESPIRATORY (INHALATION) 4 TIMES DAILY PRN
Qty: 120 VIAL | Refills: 5 | Status: SHIPPED | OUTPATIENT
Start: 2020-07-07 | End: 2020-10-20

## 2020-07-07 RX ORDER — PREDNISONE 10 MG/1
TABLET ORAL
Qty: 31 TABLET | Refills: 2 | Status: SHIPPED | OUTPATIENT
Start: 2020-07-07 | End: 2020-10-01 | Stop reason: HOSPADM

## 2020-07-07 RX ORDER — AZITHROMYCIN 250 MG/1
TABLET, FILM COATED ORAL
Qty: 36 TABLET | Refills: 3 | Status: SHIPPED | OUTPATIENT
Start: 2020-07-07 | End: 2020-10-01 | Stop reason: HOSPADM

## 2020-07-07 NOTE — PROGRESS NOTES
[facility] Pulmonary Follow up    CHIEF COMPLAINT    COPD    HISTORY OF PRESENT ILLNESS    Nolvia Huff is a 60 y.o.female here today for follow-up of her COPD.  She was last seen in the office by me in March.  She denies any respiratory illnesses or exacerbation since her last appointment.  She did have a low-dose CT screening in May and is here to discuss the results.    She continues to take Breo and Spiriva daily for her COPD.  She does have albuterol nebs that she will use occasionally or rescue inhaler as needed for shortness of breath.  I had placed her on azithromycin every Monday, Wednesday and Friday but she has not been on this for quite some time.    She continues to use 2 L nasal cannula at nighttime and some during the day as needed.  She does have shortness of breath with activity but does recover quickly at rest.  She does have worsening breathing with the increased humidity.    She denies fever, chills, sputum production, hemoptysis, night sweats, weight loss, chest pain or palpitations.  She denies any lower extremity edema or calf tenderness.  She will occasionally have some sinus or allergy symptoms.  She takes over-the-counter medication as needed.  She denies reflux symptoms.  She does take omeprazole daily.    She is up-to-date on her current vaccinations.    She quit smoking in 2004 and has a 60-pack-year history.  She had a low-dose CT screening in May.    Patient Active Problem List   Diagnosis   • Dependence on supplemental oxygen   • Tobacco abuse   • Nocturnal hypoxemia   • Anxiety disorder   • Dyslipidemia   • Emphysema/COPD (CMS/HCC)   • GERD (gastroesophageal reflux disease)   • Osteoporosis   • Personal history of tobacco use, presenting hazards to health   • COPD exacerbation (CMS/HCC)   • Osteoporosis   • Hypoxia   • Gastroesophageal reflux disease   • Pulmonary emphysema (CMS/HCC)   • Dyslipidemia   • Anxiety disorder       Allergies   Allergen Reactions   • Penicillins         Current Outpatient Medications:   •  albuterol (PROVENTIL) (2.5 MG/3ML) 0.083% nebulizer solution, Take 2.5 mg by nebulization 4 (Four) Times a Day As Needed for Wheezing., Disp: 120 vial, Rfl: 5  •  albuterol sulfate  (90 Base) MCG/ACT inhaler, Inhale 2 puffs Every 4 (Four) Hours As Needed for Wheezing., Disp: 18 g, Rfl: 11  •  Fluticasone Furoate-Vilanterol (Breo Ellipta) 200-25 MCG/INH inhaler, Inhale 1 puff Daily., Disp: 60 each, Rfl: 6  •  guaiFENesin (MUCINEX) 600 MG 12 hr tablet, Take 1 tablet by mouth 2 (Two) Times a Day., Disp: 60 tablet, Rfl: 6  •  montelukast (SINGULAIR) 10 MG tablet, Take 1 tablet by mouth Every Night., Disp: 30 tablet, Rfl: 11  •  omeprazole (priLOSEC) 40 MG capsule, Take 1 capsule by mouth Daily., Disp: 30 capsule, Rfl: 11  •  tiotropium (Spiriva HandiHaler) 18 MCG per inhalation capsule, Place 1 capsule into inhaler and inhale Daily., Disp: 90 capsule, Rfl: 3  •  triamcinolone (KENALOG) 0.1 % cream, APPLY SPARINGLY AND MASSAGE IN TWICE DAILY, Disp: 45 g, Rfl: 0  •  azithromycin (Zithromax) 250 MG tablet, Take one tablet every Monday, Wednesday, Friday, Disp: 36 tablet, Rfl: 3  •  predniSONE (DELTASONE) 10 MG tablet, Take 4 tabs daily x 3 days, then take 3 tabs daily x 3 days, then take 2 tabs daily x 3 days, then take 1 tab daily x 3 days, Disp: 31 tablet, Rfl: 2    Current Facility-Administered Medications:   •  albuterol (PROVENTIL HFA;VENTOLIN HFA) inhaler 2 puff, 2 puff, Inhalation, PRN, Samreen Sandra, APRN  MEDICATION LIST AND ALLERGIES REVIEWED.    Social History     Tobacco Use   • Smoking status: Former Smoker     Packs/day: 2.00     Years: 30.00     Pack years: 60.00     Types: Cigarettes     Last attempt to quit: 2004     Years since quittin.5   • Smokeless tobacco: Never Used   • Tobacco comment: fomer smoker 2 ppd x 30 years-Still smoke 1 cig, sporadically   Substance Use Topics   • Alcohol use: Yes     Comment: 1/2 drinks per year   • Drug use: Yes     " Types: Marijuana     Comment: occasionally smoked marijuana       FAMILY AND SOCIAL HISTORY REVIEWED.    Review of Systems   Constitutional: Negative for activity change, appetite change, fatigue, fever and unexpected weight change.   HENT: Positive for congestion. Negative for postnasal drip, rhinorrhea, sinus pressure, sore throat and voice change.    Eyes: Negative for visual disturbance.   Respiratory: Positive for cough and shortness of breath. Negative for chest tightness and wheezing.    Cardiovascular: Negative for chest pain, palpitations and leg swelling.   Gastrointestinal: Negative for abdominal distention, abdominal pain, nausea and vomiting.   Endocrine: Negative for cold intolerance and heat intolerance.   Genitourinary: Negative for difficulty urinating and urgency.   Musculoskeletal: Negative for arthralgias, back pain and neck pain.   Skin: Negative for color change and pallor.   Allergic/Immunologic: Negative for environmental allergies and food allergies.   Neurological: Negative for dizziness, syncope, weakness and light-headedness.   Hematological: Negative for adenopathy. Does not bruise/bleed easily.   Psychiatric/Behavioral: Negative for agitation and behavioral problems.   .    /74   Pulse 80   Temp 98.3 °F (36.8 °C)   Ht 164.3 cm (64.7\")   Wt 68.9 kg (152 lb)   LMP  (LMP Unknown)   SpO2 90% Comment: resting,  room air  Breastfeeding No   BMI 25.53 kg/m²     Immunization History   Administered Date(s) Administered   • Flu Vaccine Quad PF >36MO 10/04/2016, 01/30/2019   • Influenza TIV (IM) 10/20/2008   • Influenza, Unspecified 10/04/2016   • Tdap 06/29/2010       Physical Exam   Constitutional: She is oriented to person, place, and time. She appears well-developed and well-nourished.   HENT:   Head: Normocephalic and atraumatic.   Eyes: Pupils are equal, round, and reactive to light.   Neck: Normal range of motion. Neck supple. No thyromegaly present.   Cardiovascular: Normal " rate, regular rhythm, normal heart sounds and intact distal pulses. Exam reveals no gallop and no friction rub.   No murmur heard.  Pulmonary/Chest: Effort normal and breath sounds normal. No respiratory distress. She has no wheezes. She has no rales. She exhibits no tenderness.   Abdominal: Soft. Bowel sounds are normal. There is no tenderness.   Musculoskeletal: Normal range of motion.   Lymphadenopathy:     She has no cervical adenopathy.   Neurological: She is alert and oriented to person, place, and time.   Skin: Skin is warm and dry. Capillary refill takes less than 2 seconds. She is not diaphoretic.   Psychiatric: She has a normal mood and affect. Her behavior is normal.   Nursing note and vitals reviewed.        RESULTS    Ct Chest Low Dose Wo    Result Date: 5/11/2020  1.  Stable examination with chronic and emphysematous changes seen throughout the lung fields. Old healed granulomatous disease seen within the chest. Bronchiectatic changes centrally. No features of pneumonia.  2.  Lung RADS Category 2-continue annual low-dose CT screening.  D:  05/11/2020 E:  05/11/2020  This report was finalized on 5/11/2020 1:07 PM by Dr. Renuka Moore MD.      PROBLEM LIST    Problem List Items Addressed This Visit        Respiratory    Dependence on supplemental oxygen    Emphysema/COPD (CMS/[facility]) - Primary    Overview      Emphysema/COPD (492.8)   · A.  Moderate to severe obstruction, former tobacco abuse.B.  Controlled with the      azithromycin every Monday Wednesday Friday.      B. 07/06/15 Spirometry reveals an increase in obstruction, FEV1 now 42% instead of 53%. Minute ventilation reduced. Forced vital capacity is reduced suggesting a restrictive pattern.          Relevant Medications    predniSONE (DELTASONE) 10 MG tablet    azithromycin (Zithromax) 250 MG tablet    Fluticasone Furoate-Vilanterol (Breo Ellipta) 200-25 MCG/INH inhaler    tiotropium (Spiriva HandiHaler) 18 MCG per inhalation capsule     albuterol (PROVENTIL) (2.5 MG/3ML) 0.083% nebulizer solution       Digestive    GERD (gastroesophageal reflux disease)       Other    Personal history of tobacco use, presenting hazards to health            DISCUSSION  Ms. Huff was here today for follow-up of her COPD.  She seems to be doing well from a pulmonary standpoint.  I will continue Breo and Spiriva daily.  I am also going to try again with the azithromycin every Monday, Wednesday and Friday.  Hopefully she will have less exacerbations with this medication.  She would also like to have a prednisone taper on hand if she needs this in the future for difficulty with her shortness of breath.  I did advise her that it was not recommended to take this long-term but I would call in this today.    She will continue to wear 2 L nasal cannula with activity and at night.    She will continue omeprazole daily for GERD.  We also discussed reflux precautions such as elevating the head of the bed at night, not eating to 3 hours before bed and avoiding foods to trigger reflux symptoms.    We did review her CT scan that was performed in May that showed no new nodule or mass concerning for malignancy.  Based on her quitting in 2004 she does not meet screening qualifications at this time for lung cancer as it has been greater than 15 years since she quit smoking.    She will follow-up in 3 to 4 months or sooner if her symptoms worsen.  She will call with any additional concerns or questions.  I spent 25 minutes with the patient. I spent > 50% percent of this time counseling and discussing diagnosis, prognosis, diagnostic testing, evaluation, current status, treatment options and management.    HERNAN Rizzo  07/07/202012:34 PM  Electronically signed     Please note that portions of this note were completed with a voice recognition program. Efforts were made to edit the dictations, but occasionally words are mistranscribed.      CC: Alfonso Denney MD

## 2020-08-05 ENCOUNTER — OFFICE VISIT (OUTPATIENT)
Dept: PULMONOLOGY | Facility: CLINIC | Age: 60
End: 2020-08-05

## 2020-08-05 ENCOUNTER — HOSPITAL ENCOUNTER (OUTPATIENT)
Dept: BONE DENSITY | Facility: HOSPITAL | Age: 60
Discharge: HOME | End: 2020-08-05
Admitting: INTERNAL MEDICINE

## 2020-08-05 VITALS — TEMPERATURE: 97.3 F | WEIGHT: 151 LBS | BODY MASS INDEX: 25.36 KG/M2

## 2020-08-05 DIAGNOSIS — J43.2 CENTRILOBULAR EMPHYSEMA (HCC): Primary | ICD-10-CM

## 2020-08-05 PROCEDURE — 77080 DXA BONE DENSITY AXIAL: CPT

## 2020-08-05 PROCEDURE — 94618 PULMONARY STRESS TESTING: CPT | Performed by: NURSE PRACTITIONER

## 2020-09-21 ENCOUNTER — HOSPITAL ENCOUNTER (INPATIENT)
Facility: HOSPITAL | Age: 60
LOS: 10 days | Discharge: HOME | End: 2020-10-01
Attending: EMERGENCY MEDICINE | Admitting: INTERNAL MEDICINE

## 2020-09-21 ENCOUNTER — APPOINTMENT (OUTPATIENT)
Dept: CT IMAGING | Facility: HOSPITAL | Age: 60
End: 2020-09-21

## 2020-09-21 ENCOUNTER — APPOINTMENT (OUTPATIENT)
Dept: GENERAL RADIOLOGY | Facility: HOSPITAL | Age: 60
End: 2020-09-21

## 2020-09-21 ENCOUNTER — APPOINTMENT (OUTPATIENT)
Dept: CARDIOLOGY | Facility: HOSPITAL | Age: 60
End: 2020-09-21

## 2020-09-21 DIAGNOSIS — I48.19 ATRIAL FIBRILLATION, PERSISTENT (HCC): ICD-10-CM

## 2020-09-21 DIAGNOSIS — I48.91 ATRIAL FIBRILLATION WITH RAPID VENTRICULAR RESPONSE (HCC): Primary | ICD-10-CM

## 2020-09-21 DIAGNOSIS — A41.9 SEPSIS WITH ACUTE HYPERCAPNIC RESPIRATORY FAILURE WITHOUT SEPTIC SHOCK, DUE TO UNSPECIFIED ORGANISM (HCC): ICD-10-CM

## 2020-09-21 DIAGNOSIS — R65.20 SEPSIS WITH ACUTE HYPERCAPNIC RESPIRATORY FAILURE WITHOUT SEPTIC SHOCK, DUE TO UNSPECIFIED ORGANISM (HCC): ICD-10-CM

## 2020-09-21 DIAGNOSIS — J96.02 SEPSIS WITH ACUTE HYPERCAPNIC RESPIRATORY FAILURE WITHOUT SEPTIC SHOCK, DUE TO UNSPECIFIED ORGANISM (HCC): ICD-10-CM

## 2020-09-21 DIAGNOSIS — J96.01 ACUTE RESPIRATORY FAILURE WITH HYPOXIA AND HYPERCARBIA (HCC): ICD-10-CM

## 2020-09-21 DIAGNOSIS — J43.9 PULMONARY EMPHYSEMA, UNSPECIFIED EMPHYSEMA TYPE (HCC): ICD-10-CM

## 2020-09-21 DIAGNOSIS — J96.02 ACUTE RESPIRATORY FAILURE WITH HYPOXIA AND HYPERCARBIA (HCC): ICD-10-CM

## 2020-09-21 PROBLEM — G47.34 NOCTURNAL HYPOXIA: Status: ACTIVE | Noted: 2019-09-03

## 2020-09-21 PROBLEM — Z87.891 FORMER SMOKER: Status: ACTIVE | Noted: 2020-09-21

## 2020-09-21 PROBLEM — I48.0 PAROXYSMAL ATRIAL FIBRILLATION WITH RAPID VENTRICULAR RESPONSE: Status: ACTIVE | Noted: 2020-09-21

## 2020-09-21 PROBLEM — J96.21 ACUTE ON CHRONIC RESPIRATORY FAILURE WITH HYPOXIA AND HYPERCAPNIA: Status: ACTIVE | Noted: 2020-09-21

## 2020-09-21 PROBLEM — J96.22 ACUTE ON CHRONIC RESPIRATORY FAILURE WITH HYPOXIA AND HYPERCAPNIA (HCC): Status: ACTIVE | Noted: 2020-09-21

## 2020-09-21 LAB
ALBUMIN SERPL-MCNC: 4.1 G/DL (ref 3.5–5.2)
ALBUMIN/GLOB SERPL: 1.9 G/DL
ALP SERPL-CCNC: 80 U/L (ref 39–117)
ALT SERPL W P-5'-P-CCNC: 91 U/L (ref 1–33)
AMPHET+METHAMPHET UR QL: NEGATIVE
AMPHETAMINES UR QL: NEGATIVE
ANION GAP SERPL CALCULATED.3IONS-SCNC: 7 MMOL/L (ref 5–15)
APTT PPP: 28.5 SECONDS (ref 50–95)
ARTERIAL PATENCY WRIST A: ABNORMAL
AST SERPL-CCNC: 41 U/L (ref 1–32)
ATMOSPHERIC PRESS: ABNORMAL MM[HG]
B PARAPERT DNA SPEC QL NAA+PROBE: NOT DETECTED
B PERT DNA SPEC QL NAA+PROBE: NOT DETECTED
BACTERIA UR QL AUTO: ABNORMAL /HPF
BARBITURATES UR QL SCN: NEGATIVE
BASE EXCESS BLDA CALC-SCNC: -2.6 MMOL/L (ref 0–2)
BASE EXCESS BLDA CALC-SCNC: 3 MMOL/L (ref -5–5)
BASOPHILS # BLD AUTO: 0.05 10*3/MM3 (ref 0–0.2)
BASOPHILS NFR BLD AUTO: 0.4 % (ref 0–1.5)
BENZODIAZ UR QL SCN: NEGATIVE
BH CV ECHO MEAS - AO MAX PG (FULL): 4.5 MMHG
BH CV ECHO MEAS - AO MAX PG: 6.1 MMHG
BH CV ECHO MEAS - AO ROOT AREA (BSA CORRECTED): 1.7
BH CV ECHO MEAS - AO ROOT AREA: 7 CM^2
BH CV ECHO MEAS - AO ROOT DIAM: 3 CM
BH CV ECHO MEAS - AO V2 MAX: 123.1 CM/SEC
BH CV ECHO MEAS - AVA(V,A): 1.6 CM^2
BH CV ECHO MEAS - AVA(V,D): 1.6 CM^2
BH CV ECHO MEAS - BSA(HAYCOCK): 1.8 M^2
BH CV ECHO MEAS - BSA: 1.8 M^2
BH CV ECHO MEAS - BZI_BMI: 25.1 KILOGRAMS/M^2
BH CV ECHO MEAS - BZI_METRIC_HEIGHT: 165.1 CM
BH CV ECHO MEAS - BZI_METRIC_WEIGHT: 68.5 KG
BH CV ECHO MEAS - EDV(CUBED): 79.6 ML
BH CV ECHO MEAS - EDV(MOD-SP2): 65 ML
BH CV ECHO MEAS - EDV(MOD-SP4): 78 ML
BH CV ECHO MEAS - EDV(TEICH): 83.1 ML
BH CV ECHO MEAS - EF(CUBED): 53.4 %
BH CV ECHO MEAS - EF(MOD-SP2): 36.9 %
BH CV ECHO MEAS - EF(MOD-SP4): 32.1 %
BH CV ECHO MEAS - EF(TEICH): 45.6 %
BH CV ECHO MEAS - ESV(CUBED): 37 ML
BH CV ECHO MEAS - ESV(MOD-SP2): 41 ML
BH CV ECHO MEAS - ESV(MOD-SP4): 53 ML
BH CV ECHO MEAS - ESV(TEICH): 45.2 ML
BH CV ECHO MEAS - FS: 22.5 %
BH CV ECHO MEAS - IVS/LVPW: 0.95
BH CV ECHO MEAS - IVSD: 1.1 CM
BH CV ECHO MEAS - LA DIMENSION: 4.1 CM
BH CV ECHO MEAS - LA/AO: 1.4
BH CV ECHO MEAS - LAD MAJOR: 5.4 CM
BH CV ECHO MEAS - LAT PEAK E' VEL: 10 CM/SEC
BH CV ECHO MEAS - LATERAL E/E' RATIO: 7.4
BH CV ECHO MEAS - LV DIASTOLIC VOL/BSA (35-75): 44.4 ML/M^2
BH CV ECHO MEAS - LV MASS(C)D: 160.2 GRAMS
BH CV ECHO MEAS - LV MASS(C)DI: 91.3 GRAMS/M^2
BH CV ECHO MEAS - LV MAX PG: 1.6 MMHG
BH CV ECHO MEAS - LV MEAN PG: 0.92 MMHG
BH CV ECHO MEAS - LV SYSTOLIC VOL/BSA (12-30): 30.2 ML/M^2
BH CV ECHO MEAS - LV V1 MAX: 63.2 CM/SEC
BH CV ECHO MEAS - LV V1 MEAN: 44 CM/SEC
BH CV ECHO MEAS - LV V1 VTI: 8.8 CM
BH CV ECHO MEAS - LVIDD: 4.3 CM
BH CV ECHO MEAS - LVIDS: 3.3 CM
BH CV ECHO MEAS - LVLD AP2: 7.4 CM
BH CV ECHO MEAS - LVLD AP4: 7 CM
BH CV ECHO MEAS - LVLS AP2: 6.7 CM
BH CV ECHO MEAS - LVLS AP4: 7.3 CM
BH CV ECHO MEAS - LVOT AREA (M): 3.1 CM^2
BH CV ECHO MEAS - LVOT AREA: 3.1 CM^2
BH CV ECHO MEAS - LVOT DIAM: 2 CM
BH CV ECHO MEAS - LVPWD: 1.1 CM
BH CV ECHO MEAS - MED PEAK E' VEL: 10 CM/SEC
BH CV ECHO MEAS - MEDIAL E/E' RATIO: 7.4
BH CV ECHO MEAS - MR ALIAS VEL: 30.8 CM/SEC
BH CV ECHO MEAS - MR ERO: 0.15 CM^2
BH CV ECHO MEAS - MR FLOW RATE: 61.3 CM^3/SEC
BH CV ECHO MEAS - MR MAX PG: 68 MMHG
BH CV ECHO MEAS - MR MAX VEL: 409.6 CM/SEC
BH CV ECHO MEAS - MR MEAN PG: 45.6 MMHG
BH CV ECHO MEAS - MR MEAN VEL: 323.5 CM/SEC
BH CV ECHO MEAS - MR PISA RADIUS: 0.56 CM
BH CV ECHO MEAS - MR PISA: 2 CM^2
BH CV ECHO MEAS - MR VOLUME: 15.3 ML
BH CV ECHO MEAS - MR VTI: 102.5 CM
BH CV ECHO MEAS - MV AREA (1 DIAM): 7.2 CM^2
BH CV ECHO MEAS - MV DEC TIME: 0.17 SEC
BH CV ECHO MEAS - MV DIAM: 3 CM
BH CV ECHO MEAS - MV E MAX VEL: 74.6 CM/SEC
BH CV ECHO MEAS - MV FLOW AREA(1DIAM): 7.2 CM^2
BH CV ECHO MEAS - MV MAX PG: 4.6 MMHG
BH CV ECHO MEAS - MV MEAN PG: 2.7 MMHG
BH CV ECHO MEAS - MV V2 MAX: 106.7 CM/SEC
BH CV ECHO MEAS - MV V2 MEAN: 78.9 CM/SEC
BH CV ECHO MEAS - MV V2 VTI: 18.6 CM
BH CV ECHO MEAS - MVA(VTI): 1.5 CM^2
BH CV ECHO MEAS - RAP SYSTOLE: 15 MMHG
BH CV ECHO MEAS - RF(MV,LVOT)(1DIAM): 0.8
BH CV ECHO MEAS - RVSP: 48 MMHG
BH CV ECHO MEAS - SI(CUBED): 24.2 ML/M^2
BH CV ECHO MEAS - SI(LVOT): 15.6 ML/M^2
BH CV ECHO MEAS - SI(MOD-SP2): 13.7 ML/M^2
BH CV ECHO MEAS - SI(MOD-SP4): 14.2 ML/M^2
BH CV ECHO MEAS - SI(MV 1 DIAM): 76.4 ML/M^2
BH CV ECHO MEAS - SI(TEICH): 21.6 ML/M^2
BH CV ECHO MEAS - SV(CUBED): 42.5 ML
BH CV ECHO MEAS - SV(LVOT): 27.4 ML
BH CV ECHO MEAS - SV(MOD-SP2): 24 ML
BH CV ECHO MEAS - SV(MOD-SP4): 25 ML
BH CV ECHO MEAS - SV(MV 1 DIAM): 134.1 ML
BH CV ECHO MEAS - SV(TEICH): 37.9 ML
BH CV ECHO MEAS - TR MAX PG: 33 MMHG
BH CV ECHO MEAS - TR MAX VEL: 286 CM/SEC
BH CV ECHO MEASUREMENTS AVERAGE E/E' RATIO: 7.46
BH CV VAS BP RIGHT ARM: NORMAL MMHG
BH CV XLRA - RV BASE: 3.3 CM
BH CV XLRA - RV LENGTH: 6.6 CM
BH CV XLRA - RV MID: 2.9 CM
BILIRUB SERPL-MCNC: 0.5 MG/DL (ref 0–1.2)
BILIRUB UR QL STRIP: NEGATIVE
BODY TEMPERATURE: 37 C
BUN SERPL-MCNC: 17 MG/DL (ref 8–23)
BUN/CREAT SERPL: 18.7 (ref 7–25)
BUPRENORPHINE SERPL-MCNC: NEGATIVE NG/ML
C PNEUM DNA NPH QL NAA+NON-PROBE: NOT DETECTED
CA-I BLDA-SCNC: 1.25 MMOL/L (ref 1.2–1.32)
CALCIUM SPEC-SCNC: 8.6 MG/DL (ref 8.6–10.5)
CANNABINOIDS SERPL QL: POSITIVE
CHLORIDE SERPL-SCNC: 100 MMOL/L (ref 98–107)
CLARITY UR: ABNORMAL
CO2 BLDA-SCNC: 28.4 MMOL/L (ref 22–33)
CO2 BLDA-SCNC: 36 MMOL/L (ref 24–29)
CO2 SERPL-SCNC: 31 MMOL/L (ref 22–29)
COCAINE UR QL: NEGATIVE
COHGB MFR BLD: 0.7 % (ref 0–2)
COLOR UR: ABNORMAL
CREAT BLDA-MCNC: 0.9 MG/DL (ref 0.6–1.3)
CREAT SERPL-MCNC: 0.91 MG/DL (ref 0.57–1)
D-LACTATE SERPL-SCNC: 1.7 MMOL/L (ref 0.5–2)
DEPRECATED RDW RBC AUTO: 45 FL (ref 37–54)
EOSINOPHIL # BLD AUTO: 0.08 10*3/MM3 (ref 0–0.4)
EOSINOPHIL NFR BLD AUTO: 0.7 % (ref 0.3–6.2)
EPAP: 0
ERYTHROCYTE [DISTWIDTH] IN BLOOD BY AUTOMATED COUNT: 12 % (ref 12.3–15.4)
ETHANOL BLD-MCNC: <10 MG/DL (ref 0–10)
FLUAV H1 2009 PAND RNA NPH QL NAA+PROBE: NOT DETECTED
FLUAV H1 HA GENE NPH QL NAA+PROBE: NOT DETECTED
FLUAV H3 RNA NPH QL NAA+PROBE: NOT DETECTED
FLUAV SUBTYP SPEC NAA+PROBE: NOT DETECTED
FLUBV RNA ISLT QL NAA+PROBE: NOT DETECTED
GFR SERPL CREATININE-BSD FRML MDRD: 63 ML/MIN/1.73
GLOBULIN UR ELPH-MCNC: 2.2 GM/DL
GLUCOSE BLDC GLUCOMTR-MCNC: 179 MG/DL (ref 70–130)
GLUCOSE BLDC GLUCOMTR-MCNC: 186 MG/DL (ref 70–130)
GLUCOSE BLDC GLUCOMTR-MCNC: 205 MG/DL (ref 70–130)
GLUCOSE BLDC GLUCOMTR-MCNC: 311 MG/DL (ref 70–130)
GLUCOSE SERPL-MCNC: 326 MG/DL (ref 65–99)
GLUCOSE UR STRIP-MCNC: NEGATIVE MG/DL
HADV DNA SPEC NAA+PROBE: NOT DETECTED
HCO3 BLDA-SCNC: 26.4 MMOL/L (ref 20–26)
HCO3 BLDA-SCNC: 32.8 MMOL/L (ref 22–26)
HCOV 229E RNA SPEC QL NAA+PROBE: NOT DETECTED
HCOV HKU1 RNA SPEC QL NAA+PROBE: NOT DETECTED
HCOV NL63 RNA SPEC QL NAA+PROBE: NOT DETECTED
HCOV OC43 RNA SPEC QL NAA+PROBE: NOT DETECTED
HCT VFR BLD AUTO: 45.9 % (ref 34–46.6)
HCT VFR BLD CALC: 40.4 %
HCT VFR BLDA CALC: 45 % (ref 38–51)
HGB BLD-MCNC: 13.7 G/DL (ref 12–15.9)
HGB BLDA-MCNC: 13.2 G/DL (ref 14–18)
HGB BLDA-MCNC: 15.3 G/DL (ref 12–17)
HGB UR QL STRIP.AUTO: NEGATIVE
HMPV RNA NPH QL NAA+NON-PROBE: NOT DETECTED
HPIV1 RNA SPEC QL NAA+PROBE: NOT DETECTED
HPIV2 RNA SPEC QL NAA+PROBE: NOT DETECTED
HPIV3 RNA NPH QL NAA+PROBE: NOT DETECTED
HPIV4 P GENE NPH QL NAA+PROBE: NOT DETECTED
HYALINE CASTS UR QL AUTO: ABNORMAL /LPF
IMM GRANULOCYTES # BLD AUTO: 0.06 10*3/MM3 (ref 0–0.05)
IMM GRANULOCYTES NFR BLD AUTO: 0.5 % (ref 0–0.5)
INHALED O2 CONCENTRATION: 50 %
INR PPP: 1.14 (ref 0.85–1.16)
IPAP: 0
KETONES UR QL STRIP: NEGATIVE
LEFT ATRIUM VOLUME INDEX: 41 ML/M^2
LEFT ATRIUM VOLUME: 72 ML
LEUKOCYTE ESTERASE UR QL STRIP.AUTO: ABNORMAL
LIPASE SERPL-CCNC: 28 U/L (ref 13–60)
LYMPHOCYTES # BLD AUTO: 2.38 10*3/MM3 (ref 0.7–3.1)
LYMPHOCYTES NFR BLD AUTO: 19.8 % (ref 19.6–45.3)
M PNEUMO IGG SER IA-ACNC: NOT DETECTED
MCH RBC QN AUTO: 30.2 PG (ref 26.6–33)
MCHC RBC AUTO-ENTMCNC: 29.8 G/DL (ref 31.5–35.7)
MCV RBC AUTO: 101.3 FL (ref 79–97)
METHADONE UR QL SCN: NEGATIVE
METHGB BLD QL: 0.6 % (ref 0–1.5)
MODALITY: ABNORMAL
MONOCYTES # BLD AUTO: 0.88 10*3/MM3 (ref 0.1–0.9)
MONOCYTES NFR BLD AUTO: 7.3 % (ref 5–12)
MV REGURGITANT FRACTION: 14 %
MV VENA CONTRACTA: 0.65 CM
NEUTROPHILS NFR BLD AUTO: 71.3 % (ref 42.7–76)
NEUTROPHILS NFR BLD AUTO: 8.6 10*3/MM3 (ref 1.7–7)
NITRITE UR QL STRIP: POSITIVE
NOTE: ABNORMAL
NRBC BLD AUTO-RTO: 0 /100 WBC (ref 0–0.2)
NT-PROBNP SERPL-MCNC: 3421 PG/ML (ref 0–900)
OPIATES UR QL: NEGATIVE
OXYCODONE UR QL SCN: NEGATIVE
OXYHGB MFR BLDV: 98.1 % (ref 94–99)
PAW @ PEAK INSP FLOW SETTING VENT: 0 CMH2O
PCO2 BLDA: 102.7 MM HG (ref 35–45)
PCO2 BLDA: 64.4 MM HG (ref 35–45)
PCO2 TEMP ADJ BLD: 64.4 MM HG (ref 35–45)
PCP UR QL SCN: NEGATIVE
PH BLDA: 7.11 PH UNITS (ref 7.35–7.6)
PH BLDA: 7.22 PH UNITS (ref 7.35–7.45)
PH UR STRIP.AUTO: <=5 [PH] (ref 5–8)
PH, TEMP CORRECTED: 7.22 PH UNITS
PHOSPHATE SERPL-MCNC: 6.7 MG/DL (ref 2.5–4.5)
PISA ALIASING VEL: 3.1 M/S
PISA RADIUS: 0.6 CM
PLATELET # BLD AUTO: 328 10*3/MM3 (ref 140–450)
PMV BLD AUTO: 12.2 FL (ref 6–12)
PO2 BLDA: 157 MM HG (ref 83–108)
PO2 BLDA: 33 MMHG (ref 80–105)
PO2 TEMP ADJ BLD: 157 MM HG (ref 83–108)
POTASSIUM BLDA-SCNC: 4.7 MMOL/L (ref 3.5–4.9)
POTASSIUM SERPL-SCNC: 4.8 MMOL/L (ref 3.5–5.2)
PROPOXYPH UR QL: NEGATIVE
PROT SERPL-MCNC: 6.3 G/DL (ref 6–8.5)
PROT UR QL STRIP: ABNORMAL
PROTHROMBIN TIME: 14.3 SECONDS (ref 11.5–14)
RBC # BLD AUTO: 4.53 10*6/MM3 (ref 3.77–5.28)
RBC # UR: ABNORMAL /HPF
REF LAB TEST METHOD: ABNORMAL
RHINOVIRUS RNA SPEC NAA+PROBE: NOT DETECTED
RSV RNA NPH QL NAA+NON-PROBE: NOT DETECTED
SAO2 % BLDA: 41 % (ref 95–98)
SARS-COV-2 RNA NPH QL NAA+NON-PROBE: NOT DETECTED
SODIUM BLD-SCNC: 139 MMOL/L (ref 138–146)
SODIUM SERPL-SCNC: 138 MMOL/L (ref 136–145)
SP GR UR STRIP: 1.03 (ref 1–1.03)
SQUAMOUS #/AREA URNS HPF: ABNORMAL /HPF
TOTAL RATE: 0 BREATHS/MINUTE
TRICYCLICS UR QL SCN: NEGATIVE
TROPONIN T SERPL-MCNC: <0.01 NG/ML (ref 0–0.03)
TSH SERPL DL<=0.05 MIU/L-ACNC: 5.43 UIU/ML (ref 0.27–4.2)
UFH PPP CHRO-ACNC: 0.1 IU/ML (ref 0.3–0.7)
UFH PPP CHRO-ACNC: 0.16 IU/ML (ref 0.3–0.7)
UROBILINOGEN UR QL STRIP: ABNORMAL
WBC # BLD AUTO: 12.05 10*3/MM3 (ref 3.4–10.8)
WBC CLUMPS # UR AUTO: ABNORMAL /HPF
WBC UR QL AUTO: ABNORMAL /HPF

## 2020-09-21 PROCEDURE — 25010000002 HEPARIN (PORCINE) PER 1000 UNITS: Performed by: NURSE PRACTITIONER

## 2020-09-21 PROCEDURE — 94799 UNLISTED PULMONARY SVC/PX: CPT

## 2020-09-21 PROCEDURE — 82330 ASSAY OF CALCIUM: CPT

## 2020-09-21 PROCEDURE — 94640 AIRWAY INHALATION TREATMENT: CPT

## 2020-09-21 PROCEDURE — 83605 ASSAY OF LACTIC ACID: CPT | Performed by: EMERGENCY MEDICINE

## 2020-09-21 PROCEDURE — 25010000002 METHYLPREDNISOLONE PER 125 MG: Performed by: EMERGENCY MEDICINE

## 2020-09-21 PROCEDURE — 85610 PROTHROMBIN TIME: CPT | Performed by: NURSE PRACTITIONER

## 2020-09-21 PROCEDURE — 36600 WITHDRAWAL OF ARTERIAL BLOOD: CPT

## 2020-09-21 PROCEDURE — 84132 ASSAY OF SERUM POTASSIUM: CPT

## 2020-09-21 PROCEDURE — 85025 COMPLETE CBC W/AUTO DIFF WBC: CPT | Performed by: EMERGENCY MEDICINE

## 2020-09-21 PROCEDURE — 94660 CPAP INITIATION&MGMT: CPT

## 2020-09-21 PROCEDURE — 84100 ASSAY OF PHOSPHORUS: CPT | Performed by: INTERNAL MEDICINE

## 2020-09-21 PROCEDURE — 81001 URINALYSIS AUTO W/SCOPE: CPT | Performed by: EMERGENCY MEDICINE

## 2020-09-21 PROCEDURE — 25010000002 DIGOXIN PER 500 MCG: Performed by: EMERGENCY MEDICINE

## 2020-09-21 PROCEDURE — 85520 HEPARIN ASSAY: CPT | Performed by: NURSE PRACTITIONER

## 2020-09-21 PROCEDURE — 80053 COMPREHEN METABOLIC PANEL: CPT | Performed by: EMERGENCY MEDICINE

## 2020-09-21 PROCEDURE — 5A2204Z RESTORATION OF CARDIAC RHYTHM, SINGLE: ICD-10-PCS | Performed by: EMERGENCY MEDICINE

## 2020-09-21 PROCEDURE — 0 IOPAMIDOL PER 1 ML: Performed by: INTERNAL MEDICINE

## 2020-09-21 PROCEDURE — 92960 CARDIOVERSION ELECTRIC EXT: CPT

## 2020-09-21 PROCEDURE — 85730 THROMBOPLASTIN TIME PARTIAL: CPT | Performed by: NURSE PRACTITIONER

## 2020-09-21 PROCEDURE — 82803 BLOOD GASES ANY COMBINATION: CPT

## 2020-09-21 PROCEDURE — 85520 HEPARIN ASSAY: CPT

## 2020-09-21 PROCEDURE — 99285 EMERGENCY DEPT VISIT HI MDM: CPT

## 2020-09-21 PROCEDURE — 93306 TTE W/DOPPLER COMPLETE: CPT | Performed by: INTERNAL MEDICINE

## 2020-09-21 PROCEDURE — 82947 ASSAY GLUCOSE BLOOD QUANT: CPT

## 2020-09-21 PROCEDURE — 83690 ASSAY OF LIPASE: CPT | Performed by: EMERGENCY MEDICINE

## 2020-09-21 PROCEDURE — 25010000002 ONDANSETRON PER 1 MG

## 2020-09-21 PROCEDURE — 0202U NFCT DS 22 TRGT SARS-COV-2: CPT | Performed by: NURSE PRACTITIONER

## 2020-09-21 PROCEDURE — 84443 ASSAY THYROID STIM HORMONE: CPT | Performed by: EMERGENCY MEDICINE

## 2020-09-21 PROCEDURE — 25010000002 AZITHROMYCIN PER 500 MG: Performed by: EMERGENCY MEDICINE

## 2020-09-21 PROCEDURE — 85014 HEMATOCRIT: CPT

## 2020-09-21 PROCEDURE — 99291 CRITICAL CARE FIRST HOUR: CPT | Performed by: INTERNAL MEDICINE

## 2020-09-21 PROCEDURE — 82805 BLOOD GASES W/O2 SATURATION: CPT

## 2020-09-21 PROCEDURE — 93005 ELECTROCARDIOGRAM TRACING: CPT | Performed by: EMERGENCY MEDICINE

## 2020-09-21 PROCEDURE — 25010000002 DIGOXIN PER 500 MCG: Performed by: INTERNAL MEDICINE

## 2020-09-21 PROCEDURE — 82565 ASSAY OF CREATININE: CPT

## 2020-09-21 PROCEDURE — 84484 ASSAY OF TROPONIN QUANT: CPT | Performed by: EMERGENCY MEDICINE

## 2020-09-21 PROCEDURE — 25010000002 HEPARIN (PORCINE) PER 1000 UNITS

## 2020-09-21 PROCEDURE — 71045 X-RAY EXAM CHEST 1 VIEW: CPT

## 2020-09-21 PROCEDURE — 93306 TTE W/DOPPLER COMPLETE: CPT

## 2020-09-21 PROCEDURE — 25010000002 METHYLPREDNISOLONE PER 40 MG: Performed by: INTERNAL MEDICINE

## 2020-09-21 PROCEDURE — 99254 IP/OBS CNSLTJ NEW/EST MOD 60: CPT | Performed by: INTERNAL MEDICINE

## 2020-09-21 PROCEDURE — 83880 ASSAY OF NATRIURETIC PEPTIDE: CPT | Performed by: EMERGENCY MEDICINE

## 2020-09-21 PROCEDURE — 25010000002 CEFTRIAXONE PER 250 MG: Performed by: EMERGENCY MEDICINE

## 2020-09-21 PROCEDURE — 84295 ASSAY OF SERUM SODIUM: CPT

## 2020-09-21 PROCEDURE — 25010000002 VANCOMYCIN 10 G RECONSTITUTED SOLUTION: Performed by: EMERGENCY MEDICINE

## 2020-09-21 PROCEDURE — 25010000002 DIGOXIN PER 500 MCG: Performed by: NURSE PRACTITIONER

## 2020-09-21 PROCEDURE — 25010000002 HEPARIN (PORCINE) 25000-0.45 UT/250ML-% SOLUTION: Performed by: NURSE PRACTITIONER

## 2020-09-21 PROCEDURE — 71275 CT ANGIOGRAPHY CHEST: CPT

## 2020-09-21 PROCEDURE — 87040 BLOOD CULTURE FOR BACTERIA: CPT | Performed by: EMERGENCY MEDICINE

## 2020-09-21 PROCEDURE — 80307 DRUG TEST PRSMV CHEM ANLYZR: CPT | Performed by: EMERGENCY MEDICINE

## 2020-09-21 RX ORDER — NICOTINE POLACRILEX 4 MG
15 LOZENGE BUCCAL
Status: DISCONTINUED | OUTPATIENT
Start: 2020-09-21 | End: 2020-09-22

## 2020-09-21 RX ORDER — IPRATROPIUM BROMIDE AND ALBUTEROL SULFATE 2.5; .5 MG/3ML; MG/3ML
3 SOLUTION RESPIRATORY (INHALATION)
Status: DISCONTINUED | OUTPATIENT
Start: 2020-09-21 | End: 2020-10-01 | Stop reason: HOSPADM

## 2020-09-21 RX ORDER — ONDANSETRON 2 MG/ML
8 INJECTION INTRAMUSCULAR; INTRAVENOUS ONCE
Status: COMPLETED | OUTPATIENT
Start: 2020-09-21 | End: 2020-09-21

## 2020-09-21 RX ORDER — HEPARIN SODIUM 10000 [USP'U]/100ML
20 INJECTION, SOLUTION INTRAVENOUS
Status: DISCONTINUED | OUTPATIENT
Start: 2020-09-21 | End: 2020-09-24

## 2020-09-21 RX ORDER — METOPROLOL TARTRATE 5 MG/5ML
5 INJECTION INTRAVENOUS
Status: DISCONTINUED | OUTPATIENT
Start: 2020-09-21 | End: 2020-10-01 | Stop reason: HOSPADM

## 2020-09-21 RX ORDER — DEXTROSE MONOHYDRATE 25 G/50ML
25 INJECTION, SOLUTION INTRAVENOUS
Status: DISCONTINUED | OUTPATIENT
Start: 2020-09-21 | End: 2020-09-22

## 2020-09-21 RX ORDER — ETOMIDATE 2 MG/ML
10 INJECTION INTRAVENOUS ONCE
Status: COMPLETED | OUTPATIENT
Start: 2020-09-21 | End: 2020-09-21

## 2020-09-21 RX ORDER — HEPARIN SODIUM 1000 [USP'U]/ML
4000 INJECTION, SOLUTION INTRAVENOUS; SUBCUTANEOUS ONCE
Status: COMPLETED | OUTPATIENT
Start: 2020-09-21 | End: 2020-09-21

## 2020-09-21 RX ORDER — GUAIFENESIN 600 MG/1
600 TABLET, EXTENDED RELEASE ORAL 2 TIMES DAILY
Status: DISCONTINUED | OUTPATIENT
Start: 2020-09-21 | End: 2020-09-26

## 2020-09-21 RX ORDER — TRIAMCINOLONE ACETONIDE 1 MG/G
CREAM TOPICAL 2 TIMES DAILY
Status: DISCONTINUED | OUTPATIENT
Start: 2020-09-21 | End: 2020-09-27

## 2020-09-21 RX ORDER — METHYLPREDNISOLONE SODIUM SUCCINATE 125 MG/2ML
125 INJECTION, POWDER, LYOPHILIZED, FOR SOLUTION INTRAMUSCULAR; INTRAVENOUS ONCE
Status: COMPLETED | OUTPATIENT
Start: 2020-09-21 | End: 2020-09-21

## 2020-09-21 RX ORDER — ALBUTEROL SULFATE 2.5 MG/3ML
2.5 SOLUTION RESPIRATORY (INHALATION) EVERY 6 HOURS PRN
Status: DISCONTINUED | OUTPATIENT
Start: 2020-09-21 | End: 2020-10-01 | Stop reason: HOSPADM

## 2020-09-21 RX ORDER — DILTIAZEM HCL IN NACL,ISO-OSM 125 MG/125
5-15 PLASTIC BAG, INJECTION (ML) INTRAVENOUS
Status: DISCONTINUED | OUTPATIENT
Start: 2020-09-21 | End: 2020-09-25

## 2020-09-21 RX ORDER — METHYLPREDNISOLONE SODIUM SUCCINATE 40 MG/ML
40 INJECTION, POWDER, LYOPHILIZED, FOR SOLUTION INTRAMUSCULAR; INTRAVENOUS EVERY 8 HOURS
Status: DISCONTINUED | OUTPATIENT
Start: 2020-09-21 | End: 2020-09-21

## 2020-09-21 RX ORDER — ETOMIDATE 2 MG/ML
INJECTION INTRAVENOUS
Status: COMPLETED
Start: 2020-09-21 | End: 2020-09-21

## 2020-09-21 RX ORDER — DIGOXIN 0.25 MG/ML
250 INJECTION INTRAMUSCULAR; INTRAVENOUS ONCE
Status: COMPLETED | OUTPATIENT
Start: 2020-09-21 | End: 2020-09-21

## 2020-09-21 RX ORDER — ACETAMINOPHEN 500 MG
1000 TABLET ORAL ONCE
Status: COMPLETED | OUTPATIENT
Start: 2020-09-21 | End: 2020-09-21

## 2020-09-21 RX ORDER — SODIUM CHLORIDE 0.9 % (FLUSH) 0.9 %
10 SYRINGE (ML) INJECTION AS NEEDED
Status: DISCONTINUED | OUTPATIENT
Start: 2020-09-21 | End: 2020-10-01 | Stop reason: HOSPADM

## 2020-09-21 RX ORDER — HEPARIN SODIUM 1000 [USP'U]/ML
60 INJECTION, SOLUTION INTRAVENOUS; SUBCUTANEOUS AS NEEDED
Status: DISCONTINUED | OUTPATIENT
Start: 2020-09-21 | End: 2020-09-21

## 2020-09-21 RX ORDER — PANTOPRAZOLE SODIUM 40 MG/1
40 TABLET, DELAYED RELEASE ORAL EVERY MORNING
Status: DISCONTINUED | OUTPATIENT
Start: 2020-09-22 | End: 2020-09-23

## 2020-09-21 RX ORDER — METHYLPREDNISOLONE SODIUM SUCCINATE 40 MG/ML
40 INJECTION, POWDER, LYOPHILIZED, FOR SOLUTION INTRAMUSCULAR; INTRAVENOUS EVERY 12 HOURS
Status: DISCONTINUED | OUTPATIENT
Start: 2020-09-21 | End: 2020-09-22

## 2020-09-21 RX ORDER — ONDANSETRON 2 MG/ML
INJECTION INTRAMUSCULAR; INTRAVENOUS
Status: COMPLETED
Start: 2020-09-21 | End: 2020-09-21

## 2020-09-21 RX ORDER — MONTELUKAST SODIUM 10 MG/1
10 TABLET ORAL NIGHTLY
Status: DISCONTINUED | OUTPATIENT
Start: 2020-09-21 | End: 2020-10-01 | Stop reason: HOSPADM

## 2020-09-21 RX ORDER — HEPARIN SODIUM 1000 [USP'U]/ML
30 INJECTION, SOLUTION INTRAVENOUS; SUBCUTANEOUS AS NEEDED
Status: DISCONTINUED | OUTPATIENT
Start: 2020-09-21 | End: 2020-09-21

## 2020-09-21 RX ORDER — DILTIAZEM HYDROCHLORIDE 5 MG/ML
10 INJECTION INTRAVENOUS ONCE
Status: COMPLETED | OUTPATIENT
Start: 2020-09-21 | End: 2020-09-21

## 2020-09-21 RX ADMIN — ONDANSETRON 8 MG: 2 INJECTION INTRAMUSCULAR; INTRAVENOUS at 08:20

## 2020-09-21 RX ADMIN — MONTELUKAST SODIUM 10 MG: 10 TABLET, COATED ORAL at 20:31

## 2020-09-21 RX ADMIN — DIGOXIN 250 MCG: 0.25 INJECTION INTRAMUSCULAR; INTRAVENOUS at 12:53

## 2020-09-21 RX ADMIN — ETOMIDATE 10 MG: 2 INJECTION INTRAVENOUS at 05:21

## 2020-09-21 RX ADMIN — HEPARIN SODIUM 4000 UNITS: 1000 INJECTION INTRAVENOUS; SUBCUTANEOUS at 12:54

## 2020-09-21 RX ADMIN — METHYLPREDNISOLONE SODIUM SUCCINATE 125 MG: 125 INJECTION, POWDER, FOR SOLUTION INTRAMUSCULAR; INTRAVENOUS at 06:39

## 2020-09-21 RX ADMIN — Medication 15 MG/HR: at 13:12

## 2020-09-21 RX ADMIN — Medication 15 MG/HR: at 21:45

## 2020-09-21 RX ADMIN — DIGOXIN 250 MCG: 0.25 INJECTION INTRAMUSCULAR; INTRAVENOUS at 07:51

## 2020-09-21 RX ADMIN — INSULIN HUMAN 4 UNITS: 100 INJECTION, SOLUTION PARENTERAL at 23:36

## 2020-09-21 RX ADMIN — HEPARIN SODIUM 4000 UNITS: 1000 INJECTION INTRAVENOUS; SUBCUTANEOUS at 20:52

## 2020-09-21 RX ADMIN — GUAIFENESIN 600 MG: 600 TABLET, EXTENDED RELEASE ORAL at 20:31

## 2020-09-21 RX ADMIN — METOPROLOL TARTRATE 12.5 MG: 25 TABLET, FILM COATED ORAL at 20:30

## 2020-09-21 RX ADMIN — DILTIAZEM HYDROCHLORIDE 10 MG: 5 INJECTION INTRAVENOUS at 06:44

## 2020-09-21 RX ADMIN — SODIUM CHLORIDE 1000 ML: 9 INJECTION, SOLUTION INTRAVENOUS at 05:35

## 2020-09-21 RX ADMIN — IPRATROPIUM BROMIDE AND ALBUTEROL SULFATE 3 ML: 2.5; .5 SOLUTION RESPIRATORY (INHALATION) at 16:04

## 2020-09-21 RX ADMIN — DIGOXIN 250 MCG: 0.25 INJECTION INTRAMUSCULAR; INTRAVENOUS at 18:43

## 2020-09-21 RX ADMIN — VANCOMYCIN HYDROCHLORIDE 1250 MG: 10 INJECTION, POWDER, LYOPHILIZED, FOR SOLUTION INTRAVENOUS at 08:45

## 2020-09-21 RX ADMIN — AZITHROMYCIN 500 MG: 500 INJECTION, POWDER, LYOPHILIZED, FOR SOLUTION INTRAVENOUS at 07:25

## 2020-09-21 RX ADMIN — CEFTRIAXONE SODIUM 2 G: 2 INJECTION, POWDER, FOR SOLUTION INTRAMUSCULAR; INTRAVENOUS at 06:45

## 2020-09-21 RX ADMIN — HEPARIN SODIUM 12 UNITS/KG/HR: 10000 INJECTION, SOLUTION INTRAVENOUS at 12:55

## 2020-09-21 RX ADMIN — Medication 5 MG/HR: at 05:30

## 2020-09-21 RX ADMIN — METHYLPREDNISOLONE SODIUM SUCCINATE 40 MG: 40 INJECTION, POWDER, LYOPHILIZED, FOR SOLUTION INTRAMUSCULAR; INTRAVENOUS at 18:43

## 2020-09-21 RX ADMIN — ACETAMINOPHEN 1000 MG: 500 TABLET, FILM COATED ORAL at 06:10

## 2020-09-21 RX ADMIN — IPRATROPIUM BROMIDE AND ALBUTEROL SULFATE 3 ML: 2.5; .5 SOLUTION RESPIRATORY (INHALATION) at 19:15

## 2020-09-21 RX ADMIN — ETOMIDATE 10 MG: 2 INJECTION, SOLUTION INTRAVENOUS at 05:21

## 2020-09-21 RX ADMIN — IOPAMIDOL 85 ML: 755 INJECTION, SOLUTION INTRAVENOUS at 12:43

## 2020-09-22 LAB
ALBUMIN SERPL-MCNC: 3.5 G/DL (ref 3.5–5.2)
ALBUMIN/GLOB SERPL: 1.7 G/DL
ALP SERPL-CCNC: 66 U/L (ref 39–117)
ALT SERPL W P-5'-P-CCNC: 68 U/L (ref 1–33)
ANION GAP SERPL CALCULATED.3IONS-SCNC: 9 MMOL/L (ref 5–15)
AST SERPL-CCNC: 21 U/L (ref 1–32)
BASOPHILS # BLD AUTO: 0.01 10*3/MM3 (ref 0–0.2)
BASOPHILS NFR BLD AUTO: 0.1 % (ref 0–1.5)
BILIRUB SERPL-MCNC: 0.5 MG/DL (ref 0–1.2)
BUN SERPL-MCNC: 15 MG/DL (ref 8–23)
BUN/CREAT SERPL: 20.5 (ref 7–25)
CALCIUM SPEC-SCNC: 8.6 MG/DL (ref 8.6–10.5)
CHLORIDE SERPL-SCNC: 101 MMOL/L (ref 98–107)
CO2 SERPL-SCNC: 28 MMOL/L (ref 22–29)
CREAT SERPL-MCNC: 0.73 MG/DL (ref 0.57–1)
DEPRECATED RDW RBC AUTO: 43.8 FL (ref 37–54)
EOSINOPHIL # BLD AUTO: 0 10*3/MM3 (ref 0–0.4)
EOSINOPHIL NFR BLD AUTO: 0 % (ref 0.3–6.2)
ERYTHROCYTE [DISTWIDTH] IN BLOOD BY AUTOMATED COUNT: 12.1 % (ref 12.3–15.4)
GFR SERPL CREATININE-BSD FRML MDRD: 81 ML/MIN/1.73
GLOBULIN UR ELPH-MCNC: 2.1 GM/DL
GLUCOSE BLDC GLUCOMTR-MCNC: 156 MG/DL (ref 70–130)
GLUCOSE BLDC GLUCOMTR-MCNC: 170 MG/DL (ref 70–130)
GLUCOSE BLDC GLUCOMTR-MCNC: 198 MG/DL (ref 70–130)
GLUCOSE BLDC GLUCOMTR-MCNC: 228 MG/DL (ref 70–130)
GLUCOSE SERPL-MCNC: 164 MG/DL (ref 65–99)
HBA1C MFR BLD: 6.3 % (ref 4.8–5.6)
HCT VFR BLD AUTO: 40.7 % (ref 34–46.6)
HGB BLD-MCNC: 12.5 G/DL (ref 12–15.9)
IMM GRANULOCYTES # BLD AUTO: 0.13 10*3/MM3 (ref 0–0.05)
IMM GRANULOCYTES NFR BLD AUTO: 0.8 % (ref 0–0.5)
LYMPHOCYTES # BLD AUTO: 1 10*3/MM3 (ref 0.7–3.1)
LYMPHOCYTES NFR BLD AUTO: 5.8 % (ref 19.6–45.3)
MAGNESIUM SERPL-MCNC: 2.2 MG/DL (ref 1.6–2.4)
MCH RBC QN AUTO: 30.3 PG (ref 26.6–33)
MCHC RBC AUTO-ENTMCNC: 30.7 G/DL (ref 31.5–35.7)
MCV RBC AUTO: 98.8 FL (ref 79–97)
MONOCYTES # BLD AUTO: 0.52 10*3/MM3 (ref 0.1–0.9)
MONOCYTES NFR BLD AUTO: 3 % (ref 5–12)
NEUTROPHILS NFR BLD AUTO: 15.56 10*3/MM3 (ref 1.7–7)
NEUTROPHILS NFR BLD AUTO: 90.3 % (ref 42.7–76)
NRBC BLD AUTO-RTO: 0 /100 WBC (ref 0–0.2)
PHOSPHATE SERPL-MCNC: 3.4 MG/DL (ref 2.5–4.5)
PLATELET # BLD AUTO: 226 10*3/MM3 (ref 140–450)
PMV BLD AUTO: 12.1 FL (ref 6–12)
POTASSIUM SERPL-SCNC: 5.3 MMOL/L (ref 3.5–5.2)
PROT SERPL-MCNC: 5.6 G/DL (ref 6–8.5)
RBC # BLD AUTO: 4.12 10*6/MM3 (ref 3.77–5.28)
SODIUM SERPL-SCNC: 138 MMOL/L (ref 136–145)
UFH PPP CHRO-ACNC: 0.2 IU/ML (ref 0.3–0.7)
UFH PPP CHRO-ACNC: 0.52 IU/ML (ref 0.3–0.7)
UFH PPP CHRO-ACNC: 0.68 IU/ML (ref 0.3–0.7)
WBC # BLD AUTO: 17.22 10*3/MM3 (ref 3.4–10.8)

## 2020-09-22 PROCEDURE — 25010000002 AZITHROMYCIN PER 500 MG: Performed by: INTERNAL MEDICINE

## 2020-09-22 PROCEDURE — 83735 ASSAY OF MAGNESIUM: CPT | Performed by: INTERNAL MEDICINE

## 2020-09-22 PROCEDURE — 99232 SBSQ HOSP IP/OBS MODERATE 35: CPT | Performed by: INTERNAL MEDICINE

## 2020-09-22 PROCEDURE — 25010000002 CEFTRIAXONE: Performed by: NURSE PRACTITIONER

## 2020-09-22 PROCEDURE — 85025 COMPLETE CBC W/AUTO DIFF WBC: CPT | Performed by: INTERNAL MEDICINE

## 2020-09-22 PROCEDURE — 85520 HEPARIN ASSAY: CPT

## 2020-09-22 PROCEDURE — 80053 COMPREHEN METABOLIC PANEL: CPT | Performed by: INTERNAL MEDICINE

## 2020-09-22 PROCEDURE — 63710000001 INSULIN REGULAR HUMAN PER 5 UNITS: Performed by: NURSE PRACTITIONER

## 2020-09-22 PROCEDURE — 94799 UNLISTED PULMONARY SVC/PX: CPT

## 2020-09-22 PROCEDURE — 25010000002 METHYLPREDNISOLONE PER 40 MG: Performed by: INTERNAL MEDICINE

## 2020-09-22 PROCEDURE — 84100 ASSAY OF PHOSPHORUS: CPT | Performed by: INTERNAL MEDICINE

## 2020-09-22 PROCEDURE — 63710000001 INSULIN LISPRO (HUMAN) PER 5 UNITS: Performed by: INTERNAL MEDICINE

## 2020-09-22 PROCEDURE — 83036 HEMOGLOBIN GLYCOSYLATED A1C: CPT | Performed by: INTERNAL MEDICINE

## 2020-09-22 PROCEDURE — 25010000002 HEPARIN (PORCINE) 25000-0.45 UT/250ML-% SOLUTION

## 2020-09-22 PROCEDURE — 82962 GLUCOSE BLOOD TEST: CPT

## 2020-09-22 PROCEDURE — 63710000001 INSULIN DETEMIR PER 5 UNITS: Performed by: INTERNAL MEDICINE

## 2020-09-22 PROCEDURE — 25010000002 HEPARIN (PORCINE) PER 1000 UNITS

## 2020-09-22 RX ORDER — PREDNISONE 20 MG/1
40 TABLET ORAL
Status: DISCONTINUED | OUTPATIENT
Start: 2020-09-23 | End: 2020-09-26

## 2020-09-22 RX ORDER — HEPARIN SODIUM 1000 [USP'U]/ML
4000 INJECTION, SOLUTION INTRAVENOUS; SUBCUTANEOUS ONCE
Status: COMPLETED | OUTPATIENT
Start: 2020-09-22 | End: 2020-09-22

## 2020-09-22 RX ORDER — AZITHROMYCIN 250 MG/1
250 TABLET, FILM COATED ORAL
Status: DISCONTINUED | OUTPATIENT
Start: 2020-09-23 | End: 2020-09-23

## 2020-09-22 RX ORDER — SIMETHICONE 80 MG
80 TABLET,CHEWABLE ORAL ONCE
Status: COMPLETED | OUTPATIENT
Start: 2020-09-22 | End: 2020-09-22

## 2020-09-22 RX ORDER — ACETAMINOPHEN 325 MG/1
650 TABLET ORAL EVERY 6 HOURS PRN
Status: DISCONTINUED | OUTPATIENT
Start: 2020-09-22 | End: 2020-10-01 | Stop reason: HOSPADM

## 2020-09-22 RX ADMIN — METOROPROLOL TARTRATE 5 MG: 5 INJECTION, SOLUTION INTRAVENOUS at 14:12

## 2020-09-22 RX ADMIN — SIMETHICONE 80 MG: 80 TABLET, CHEWABLE ORAL at 20:14

## 2020-09-22 RX ADMIN — INSULIN LISPRO 4 UNITS: 100 INJECTION, SOLUTION INTRAVENOUS; SUBCUTANEOUS at 20:47

## 2020-09-22 RX ADMIN — INSULIN LISPRO 2 UNITS: 100 INJECTION, SOLUTION INTRAVENOUS; SUBCUTANEOUS at 11:43

## 2020-09-22 RX ADMIN — HEPARIN SODIUM 4000 UNITS: 1000 INJECTION, SOLUTION INTRAVENOUS; SUBCUTANEOUS at 06:00

## 2020-09-22 RX ADMIN — INSULIN LISPRO 2 UNITS: 100 INJECTION, SOLUTION INTRAVENOUS; SUBCUTANEOUS at 18:05

## 2020-09-22 RX ADMIN — HEPARIN SODIUM 20 UNITS/KG/HR: 10000 INJECTION, SOLUTION INTRAVENOUS at 11:41

## 2020-09-22 RX ADMIN — Medication 15 MG/HR: at 14:06

## 2020-09-22 RX ADMIN — ALBUTEROL SULFATE 2.5 MG: 2.5 SOLUTION RESPIRATORY (INHALATION) at 11:20

## 2020-09-22 RX ADMIN — IPRATROPIUM BROMIDE AND ALBUTEROL SULFATE 3 ML: 2.5; .5 SOLUTION RESPIRATORY (INHALATION) at 19:58

## 2020-09-22 RX ADMIN — Medication 15 MG/HR: at 06:22

## 2020-09-22 RX ADMIN — METHYLPREDNISOLONE SODIUM SUCCINATE 40 MG: 40 INJECTION, POWDER, LYOPHILIZED, FOR SOLUTION INTRAMUSCULAR; INTRAVENOUS at 06:21

## 2020-09-22 RX ADMIN — INSULIN HUMAN 2 UNITS: 100 INJECTION, SOLUTION PARENTERAL at 06:21

## 2020-09-22 RX ADMIN — GUAIFENESIN 600 MG: 600 TABLET, EXTENDED RELEASE ORAL at 20:14

## 2020-09-22 RX ADMIN — AZITHROMYCIN 500 MG: 500 INJECTION, POWDER, LYOPHILIZED, FOR SOLUTION INTRAVENOUS at 08:02

## 2020-09-22 RX ADMIN — Medication 15 MG/HR: at 21:54

## 2020-09-22 RX ADMIN — IPRATROPIUM BROMIDE AND ALBUTEROL SULFATE 3 ML: 2.5; .5 SOLUTION RESPIRATORY (INHALATION) at 05:42

## 2020-09-22 RX ADMIN — ACETAMINOPHEN 650 MG: 325 TABLET, FILM COATED ORAL at 00:23

## 2020-09-22 RX ADMIN — GUAIFENESIN 600 MG: 600 TABLET, EXTENDED RELEASE ORAL at 08:01

## 2020-09-22 RX ADMIN — INSULIN DETEMIR 10 UNITS: 100 INJECTION, SOLUTION SUBCUTANEOUS at 14:05

## 2020-09-22 RX ADMIN — METOPROLOL TARTRATE 12.5 MG: 25 TABLET, FILM COATED ORAL at 20:14

## 2020-09-22 RX ADMIN — PANTOPRAZOLE SODIUM 40 MG: 40 TABLET, DELAYED RELEASE ORAL at 06:22

## 2020-09-22 RX ADMIN — IPRATROPIUM BROMIDE AND ALBUTEROL SULFATE 3 ML: 2.5; .5 SOLUTION RESPIRATORY (INHALATION) at 15:46

## 2020-09-22 RX ADMIN — METOPROLOL TARTRATE 12.5 MG: 25 TABLET, FILM COATED ORAL at 08:01

## 2020-09-22 RX ADMIN — CEFTRIAXONE SODIUM 1 G: 1 INJECTION, POWDER, FOR SOLUTION INTRAMUSCULAR; INTRAVENOUS at 06:42

## 2020-09-22 RX ADMIN — MONTELUKAST SODIUM 10 MG: 10 TABLET, COATED ORAL at 20:14

## 2020-09-23 ENCOUNTER — APPOINTMENT (OUTPATIENT)
Dept: CARDIOLOGY | Facility: HOSPITAL | Age: 60
End: 2020-09-23

## 2020-09-23 LAB
ANION GAP SERPL CALCULATED.3IONS-SCNC: 12 MMOL/L (ref 5–15)
BH CV ECHO MEAS - BSA(HAYCOCK): 1.8 M^2
BH CV ECHO MEAS - BSA: 1.7 M^2
BH CV ECHO MEAS - BZI_BMI: 25.9 KILOGRAMS/M^2
BH CV ECHO MEAS - BZI_METRIC_HEIGHT: 162.6 CM
BH CV ECHO MEAS - BZI_METRIC_WEIGHT: 68.5 KG
BH CV VAS BP LEFT ARM: NORMAL MMHG
BUN SERPL-MCNC: 37 MG/DL (ref 8–23)
BUN/CREAT SERPL: 36.6 (ref 7–25)
CALCIUM SPEC-SCNC: 9.1 MG/DL (ref 8.6–10.5)
CHLORIDE SERPL-SCNC: 99 MMOL/L (ref 98–107)
CO2 SERPL-SCNC: 27 MMOL/L (ref 22–29)
CREAT SERPL-MCNC: 1.01 MG/DL (ref 0.57–1)
GFR SERPL CREATININE-BSD FRML MDRD: 56 ML/MIN/1.73
GLUCOSE BLDC GLUCOMTR-MCNC: 126 MG/DL (ref 70–130)
GLUCOSE BLDC GLUCOMTR-MCNC: 140 MG/DL (ref 70–130)
GLUCOSE BLDC GLUCOMTR-MCNC: 148 MG/DL (ref 70–130)
GLUCOSE BLDC GLUCOMTR-MCNC: 266 MG/DL (ref 70–130)
GLUCOSE SERPL-MCNC: 128 MG/DL (ref 65–99)
LV EF 2D ECHO EST: 45 %
MAGNESIUM SERPL-MCNC: 2.4 MG/DL (ref 1.6–2.4)
MAGNESIUM SERPL-MCNC: 2.5 MG/DL (ref 1.6–2.4)
NT-PROBNP SERPL-MCNC: 4745 PG/ML (ref 0–900)
POTASSIUM SERPL-SCNC: 5.3 MMOL/L (ref 3.5–5.2)
PROCALCITONIN SERPL-MCNC: 0.16 NG/ML (ref 0–0.25)
SODIUM SERPL-SCNC: 138 MMOL/L (ref 136–145)
UFH PPP CHRO-ACNC: 0.32 IU/ML (ref 0.3–0.7)
UFH PPP CHRO-ACNC: 0.35 IU/ML (ref 0.3–0.7)
UFH PPP CHRO-ACNC: 0.49 IU/ML (ref 0.3–0.7)

## 2020-09-23 PROCEDURE — B24BZZ4 ULTRASONOGRAPHY OF HEART WITH AORTA, TRANSESOPHAGEAL: ICD-10-PCS | Performed by: INTERNAL MEDICINE

## 2020-09-23 PROCEDURE — 85520 HEPARIN ASSAY: CPT

## 2020-09-23 PROCEDURE — 99232 SBSQ HOSP IP/OBS MODERATE 35: CPT | Performed by: INTERNAL MEDICINE

## 2020-09-23 PROCEDURE — 94799 UNLISTED PULMONARY SVC/PX: CPT

## 2020-09-23 PROCEDURE — 5A2204Z RESTORATION OF CARDIAC RHYTHM, SINGLE: ICD-10-PCS | Performed by: INTERNAL MEDICINE

## 2020-09-23 PROCEDURE — 92960 CARDIOVERSION ELECTRIC EXT: CPT | Performed by: INTERNAL MEDICINE

## 2020-09-23 PROCEDURE — 80048 BASIC METABOLIC PNL TOTAL CA: CPT | Performed by: INTERNAL MEDICINE

## 2020-09-23 PROCEDURE — 83880 ASSAY OF NATRIURETIC PEPTIDE: CPT | Performed by: INTERNAL MEDICINE

## 2020-09-23 PROCEDURE — 25010000002 FENTANYL CITRATE (PF) 100 MCG/2ML SOLUTION

## 2020-09-23 PROCEDURE — 93321 DOPPLER ECHO F-UP/LMTD STD: CPT | Performed by: INTERNAL MEDICINE

## 2020-09-23 PROCEDURE — 83735 ASSAY OF MAGNESIUM: CPT | Performed by: INTERNAL MEDICINE

## 2020-09-23 PROCEDURE — 93312 ECHO TRANSESOPHAGEAL: CPT

## 2020-09-23 PROCEDURE — 93325 DOPPLER ECHO COLOR FLOW MAPG: CPT | Performed by: INTERNAL MEDICINE

## 2020-09-23 PROCEDURE — 25010000002 HEPARIN (PORCINE) 25000-0.45 UT/250ML-% SOLUTION

## 2020-09-23 PROCEDURE — 25010000002 CEFTRIAXONE PER 250 MG: Performed by: NURSE PRACTITIONER

## 2020-09-23 PROCEDURE — 25010000002 MIDAZOLAM PER 1 MG

## 2020-09-23 PROCEDURE — 93005 ELECTROCARDIOGRAM TRACING: CPT | Performed by: INTERNAL MEDICINE

## 2020-09-23 PROCEDURE — 93312 ECHO TRANSESOPHAGEAL: CPT | Performed by: INTERNAL MEDICINE

## 2020-09-23 PROCEDURE — 93010 ELECTROCARDIOGRAM REPORT: CPT | Performed by: INTERNAL MEDICINE

## 2020-09-23 PROCEDURE — 84145 PROCALCITONIN (PCT): CPT | Performed by: INTERNAL MEDICINE

## 2020-09-23 PROCEDURE — 82962 GLUCOSE BLOOD TEST: CPT

## 2020-09-23 PROCEDURE — 63710000001 INSULIN DETEMIR PER 5 UNITS: Performed by: INTERNAL MEDICINE

## 2020-09-23 PROCEDURE — 63710000001 PREDNISONE PER 1 MG: Performed by: INTERNAL MEDICINE

## 2020-09-23 PROCEDURE — 93321 DOPPLER ECHO F-UP/LMTD STD: CPT

## 2020-09-23 PROCEDURE — 63710000001 INSULIN LISPRO (HUMAN) PER 5 UNITS: Performed by: INTERNAL MEDICINE

## 2020-09-23 PROCEDURE — 93325 DOPPLER ECHO COLOR FLOW MAPG: CPT

## 2020-09-23 RX ORDER — FENTANYL CITRATE 50 UG/ML
INJECTION, SOLUTION INTRAMUSCULAR; INTRAVENOUS
Status: COMPLETED
Start: 2020-09-23 | End: 2020-09-23

## 2020-09-23 RX ORDER — DOFETILIDE 0.5 MG/1
500 CAPSULE ORAL EVERY 12 HOURS
Status: DISCONTINUED | OUTPATIENT
Start: 2020-09-23 | End: 2020-09-24

## 2020-09-23 RX ORDER — POTASSIUM CHLORIDE 750 MG/1
40 CAPSULE, EXTENDED RELEASE ORAL AS NEEDED
Status: DISCONTINUED | OUTPATIENT
Start: 2020-09-23 | End: 2020-10-01 | Stop reason: HOSPADM

## 2020-09-23 RX ORDER — MIDAZOLAM HYDROCHLORIDE 1 MG/ML
2 INJECTION INTRAMUSCULAR; INTRAVENOUS ONCE
Status: COMPLETED | OUTPATIENT
Start: 2020-09-23 | End: 2020-09-23

## 2020-09-23 RX ORDER — POTASSIUM CHLORIDE 1.5 G/1.77G
40 POWDER, FOR SOLUTION ORAL AS NEEDED
Status: DISCONTINUED | OUTPATIENT
Start: 2020-09-23 | End: 2020-10-01 | Stop reason: HOSPADM

## 2020-09-23 RX ORDER — PANTOPRAZOLE SODIUM 40 MG/10ML
40 INJECTION, POWDER, LYOPHILIZED, FOR SOLUTION INTRAVENOUS
Status: DISCONTINUED | OUTPATIENT
Start: 2020-09-23 | End: 2020-09-23

## 2020-09-23 RX ORDER — MAGNESIUM SULFATE HEPTAHYDRATE 40 MG/ML
2 INJECTION, SOLUTION INTRAVENOUS AS NEEDED
Status: DISCONTINUED | OUTPATIENT
Start: 2020-09-23 | End: 2020-10-01 | Stop reason: HOSPADM

## 2020-09-23 RX ORDER — ALPRAZOLAM 0.25 MG/1
0.25 TABLET ORAL 3 TIMES DAILY PRN
Status: DISPENSED | OUTPATIENT
Start: 2020-09-23 | End: 2020-09-30

## 2020-09-23 RX ORDER — MIDAZOLAM HYDROCHLORIDE 1 MG/ML
INJECTION INTRAMUSCULAR; INTRAVENOUS
Status: COMPLETED
Start: 2020-09-23 | End: 2020-09-23

## 2020-09-23 RX ORDER — MAGNESIUM SULFATE HEPTAHYDRATE 40 MG/ML
4 INJECTION, SOLUTION INTRAVENOUS AS NEEDED
Status: DISCONTINUED | OUTPATIENT
Start: 2020-09-23 | End: 2020-10-01 | Stop reason: HOSPADM

## 2020-09-23 RX ORDER — SIMETHICONE 80 MG
80 TABLET,CHEWABLE ORAL 4 TIMES DAILY PRN
Status: DISCONTINUED | OUTPATIENT
Start: 2020-09-23 | End: 2020-10-01 | Stop reason: HOSPADM

## 2020-09-23 RX ORDER — PANTOPRAZOLE SODIUM 40 MG/1
40 TABLET, DELAYED RELEASE ORAL
Status: DISCONTINUED | OUTPATIENT
Start: 2020-09-24 | End: 2020-10-01 | Stop reason: HOSPADM

## 2020-09-23 RX ADMIN — METHOHEXITAL SODIUM 20 MG: 500 INJECTION, POWDER, LYOPHILIZED, FOR SOLUTION INTRAMUSCULAR; INTRAVENOUS; RECTAL at 13:11

## 2020-09-23 RX ADMIN — FENTANYL CITRATE 25 MCG: 0.05 INJECTION, SOLUTION INTRAMUSCULAR; INTRAVENOUS at 13:26

## 2020-09-23 RX ADMIN — GUAIFENESIN 600 MG: 600 TABLET, EXTENDED RELEASE ORAL at 21:11

## 2020-09-23 RX ADMIN — GUAIFENESIN 600 MG: 600 TABLET, EXTENDED RELEASE ORAL at 09:11

## 2020-09-23 RX ADMIN — METHOHEXITAL SODIUM 30 MG: 500 INJECTION, POWDER, LYOPHILIZED, FOR SOLUTION INTRAMUSCULAR; INTRAVENOUS; RECTAL at 13:25

## 2020-09-23 RX ADMIN — IPRATROPIUM BROMIDE AND ALBUTEROL SULFATE 3 ML: 2.5; .5 SOLUTION RESPIRATORY (INHALATION) at 19:13

## 2020-09-23 RX ADMIN — MIDAZOLAM HYDROCHLORIDE 2 MG: 1 INJECTION INTRAMUSCULAR; INTRAVENOUS at 13:35

## 2020-09-23 RX ADMIN — METOPROLOL TARTRATE 12.5 MG: 25 TABLET, FILM COATED ORAL at 21:11

## 2020-09-23 RX ADMIN — PREDNISONE 40 MG: 20 TABLET ORAL at 09:11

## 2020-09-23 RX ADMIN — INSULIN LISPRO 6 UNITS: 100 INJECTION, SOLUTION INTRAVENOUS; SUBCUTANEOUS at 21:23

## 2020-09-23 RX ADMIN — TRIAMCINOLONE ACETONIDE: 1 CREAM TOPICAL at 09:13

## 2020-09-23 RX ADMIN — IPRATROPIUM BROMIDE AND ALBUTEROL SULFATE 3 ML: 2.5; .5 SOLUTION RESPIRATORY (INHALATION) at 16:16

## 2020-09-23 RX ADMIN — INSULIN DETEMIR 10 UNITS: 100 INJECTION, SOLUTION SUBCUTANEOUS at 09:19

## 2020-09-23 RX ADMIN — Medication 10 MG/HR: at 13:45

## 2020-09-23 RX ADMIN — PANTOPRAZOLE SODIUM 40 MG: 40 INJECTION, POWDER, FOR SOLUTION INTRAVENOUS at 09:11

## 2020-09-23 RX ADMIN — DOFETILIDE 500 MCG: 0.5 CAPSULE ORAL at 21:11

## 2020-09-23 RX ADMIN — HEPARIN SODIUM 20 UNITS/KG/HR: 10000 INJECTION, SOLUTION INTRAVENOUS at 23:11

## 2020-09-23 RX ADMIN — SIMETHICONE 80 MG: 80 TABLET, CHEWABLE ORAL at 21:39

## 2020-09-23 RX ADMIN — MIDAZOLAM 2 MG: 1 INJECTION INTRAMUSCULAR; INTRAVENOUS at 13:35

## 2020-09-23 RX ADMIN — MONTELUKAST SODIUM 10 MG: 10 TABLET, COATED ORAL at 21:11

## 2020-09-23 RX ADMIN — ALPRAZOLAM 0.25 MG: 0.25 TABLET ORAL at 16:40

## 2020-09-23 RX ADMIN — CEFTRIAXONE SODIUM 1 G: 1 INJECTION, POWDER, FOR SOLUTION INTRAMUSCULAR; INTRAVENOUS at 06:20

## 2020-09-23 RX ADMIN — Medication 15 MG/HR: at 05:04

## 2020-09-23 RX ADMIN — AZITHROMYCIN MONOHYDRATE 250 MG: 250 TABLET ORAL at 09:11

## 2020-09-23 RX ADMIN — HEPARIN SODIUM 20 UNITS/KG/HR: 10000 INJECTION, SOLUTION INTRAVENOUS at 04:38

## 2020-09-23 RX ADMIN — IPRATROPIUM BROMIDE AND ALBUTEROL SULFATE 3 ML: 2.5; .5 SOLUTION RESPIRATORY (INHALATION) at 12:47

## 2020-09-24 ENCOUNTER — APPOINTMENT (OUTPATIENT)
Dept: GENERAL RADIOLOGY | Facility: HOSPITAL | Age: 60
End: 2020-09-24

## 2020-09-24 LAB
ANION GAP SERPL CALCULATED.3IONS-SCNC: 6 MMOL/L (ref 5–15)
ANION GAP SERPL CALCULATED.3IONS-SCNC: 8 MMOL/L (ref 5–15)
BASOPHILS # BLD AUTO: 0.01 10*3/MM3 (ref 0–0.2)
BASOPHILS NFR BLD AUTO: 0.1 % (ref 0–1.5)
BUN SERPL-MCNC: 22 MG/DL (ref 8–23)
BUN SERPL-MCNC: 33 MG/DL (ref 8–23)
BUN/CREAT SERPL: 40.7 (ref 7–25)
BUN/CREAT SERPL: 52.4 (ref 7–25)
CALCIUM SPEC-SCNC: 8.4 MG/DL (ref 8.6–10.5)
CALCIUM SPEC-SCNC: 8.8 MG/DL (ref 8.6–10.5)
CHLORIDE SERPL-SCNC: 100 MMOL/L (ref 98–107)
CHLORIDE SERPL-SCNC: 101 MMOL/L (ref 98–107)
CO2 SERPL-SCNC: 29 MMOL/L (ref 22–29)
CO2 SERPL-SCNC: 29 MMOL/L (ref 22–29)
CREAT SERPL-MCNC: 0.54 MG/DL (ref 0.57–1)
CREAT SERPL-MCNC: 0.63 MG/DL (ref 0.57–1)
DEPRECATED RDW RBC AUTO: 44.9 FL (ref 37–54)
EOSINOPHIL # BLD AUTO: 0 10*3/MM3 (ref 0–0.4)
EOSINOPHIL NFR BLD AUTO: 0 % (ref 0.3–6.2)
ERYTHROCYTE [DISTWIDTH] IN BLOOD BY AUTOMATED COUNT: 12.6 % (ref 12.3–15.4)
GFR SERPL CREATININE-BSD FRML MDRD: 114 ML/MIN/1.73
GFR SERPL CREATININE-BSD FRML MDRD: 96 ML/MIN/1.73
GLUCOSE BLDC GLUCOMTR-MCNC: 107 MG/DL (ref 70–130)
GLUCOSE BLDC GLUCOMTR-MCNC: 139 MG/DL (ref 70–130)
GLUCOSE BLDC GLUCOMTR-MCNC: 169 MG/DL (ref 70–130)
GLUCOSE BLDC GLUCOMTR-MCNC: 210 MG/DL (ref 70–130)
GLUCOSE SERPL-MCNC: 110 MG/DL (ref 65–99)
GLUCOSE SERPL-MCNC: 177 MG/DL (ref 65–99)
HCT VFR BLD AUTO: 39 % (ref 34–46.6)
HGB BLD-MCNC: 11.9 G/DL (ref 12–15.9)
IMM GRANULOCYTES # BLD AUTO: 0.2 10*3/MM3 (ref 0–0.05)
IMM GRANULOCYTES NFR BLD AUTO: 1.3 % (ref 0–0.5)
LYMPHOCYTES # BLD AUTO: 0.8 10*3/MM3 (ref 0.7–3.1)
LYMPHOCYTES NFR BLD AUTO: 5.2 % (ref 19.6–45.3)
MAGNESIUM SERPL-MCNC: 2.4 MG/DL (ref 1.6–2.4)
MAGNESIUM SERPL-MCNC: 3.8 MG/DL (ref 1.6–2.4)
MCH RBC QN AUTO: 29.7 PG (ref 26.6–33)
MCHC RBC AUTO-ENTMCNC: 30.5 G/DL (ref 31.5–35.7)
MCV RBC AUTO: 97.3 FL (ref 79–97)
MONOCYTES # BLD AUTO: 1.1 10*3/MM3 (ref 0.1–0.9)
MONOCYTES NFR BLD AUTO: 7.2 % (ref 5–12)
NEUTROPHILS NFR BLD AUTO: 13.18 10*3/MM3 (ref 1.7–7)
NEUTROPHILS NFR BLD AUTO: 86.2 % (ref 42.7–76)
NRBC BLD AUTO-RTO: 0 /100 WBC (ref 0–0.2)
NT-PROBNP SERPL-MCNC: 3371 PG/ML (ref 0–900)
PHOSPHATE SERPL-MCNC: 3.6 MG/DL (ref 2.5–4.5)
PLATELET # BLD AUTO: 243 10*3/MM3 (ref 140–450)
PMV BLD AUTO: 11.6 FL (ref 6–12)
POTASSIUM SERPL-SCNC: 4.6 MMOL/L (ref 3.5–5.2)
POTASSIUM SERPL-SCNC: 4.8 MMOL/L (ref 3.5–5.2)
RBC # BLD AUTO: 4.01 10*6/MM3 (ref 3.77–5.28)
SODIUM SERPL-SCNC: 136 MMOL/L (ref 136–145)
SODIUM SERPL-SCNC: 137 MMOL/L (ref 136–145)
UFH PPP CHRO-ACNC: 0.32 IU/ML (ref 0.3–0.7)
WBC # BLD AUTO: 15.29 10*3/MM3 (ref 3.4–10.8)

## 2020-09-24 PROCEDURE — 93010 ELECTROCARDIOGRAM REPORT: CPT | Performed by: INTERNAL MEDICINE

## 2020-09-24 PROCEDURE — 83735 ASSAY OF MAGNESIUM: CPT | Performed by: NURSE PRACTITIONER

## 2020-09-24 PROCEDURE — 85025 COMPLETE CBC W/AUTO DIFF WBC: CPT | Performed by: INTERNAL MEDICINE

## 2020-09-24 PROCEDURE — 83735 ASSAY OF MAGNESIUM: CPT | Performed by: INTERNAL MEDICINE

## 2020-09-24 PROCEDURE — 63710000001 INSULIN DETEMIR PER 5 UNITS: Performed by: INTERNAL MEDICINE

## 2020-09-24 PROCEDURE — 99232 SBSQ HOSP IP/OBS MODERATE 35: CPT | Performed by: INTERNAL MEDICINE

## 2020-09-24 PROCEDURE — 80048 BASIC METABOLIC PNL TOTAL CA: CPT | Performed by: INTERNAL MEDICINE

## 2020-09-24 PROCEDURE — 94799 UNLISTED PULMONARY SVC/PX: CPT

## 2020-09-24 PROCEDURE — 71045 X-RAY EXAM CHEST 1 VIEW: CPT

## 2020-09-24 PROCEDURE — 80048 BASIC METABOLIC PNL TOTAL CA: CPT | Performed by: NURSE PRACTITIONER

## 2020-09-24 PROCEDURE — 93005 ELECTROCARDIOGRAM TRACING: CPT | Performed by: NURSE PRACTITIONER

## 2020-09-24 PROCEDURE — 63710000001 INSULIN LISPRO (HUMAN) PER 5 UNITS: Performed by: INTERNAL MEDICINE

## 2020-09-24 PROCEDURE — 82962 GLUCOSE BLOOD TEST: CPT

## 2020-09-24 PROCEDURE — 25010000002 MAGNESIUM SULFATE PER 500 MG OF MAGNESIUM: Performed by: NURSE PRACTITIONER

## 2020-09-24 PROCEDURE — 93005 ELECTROCARDIOGRAM TRACING: CPT | Performed by: INTERNAL MEDICINE

## 2020-09-24 PROCEDURE — 63710000001 PREDNISONE PER 1 MG: Performed by: INTERNAL MEDICINE

## 2020-09-24 PROCEDURE — 84100 ASSAY OF PHOSPHORUS: CPT | Performed by: INTERNAL MEDICINE

## 2020-09-24 PROCEDURE — 83880 ASSAY OF NATRIURETIC PEPTIDE: CPT | Performed by: INTERNAL MEDICINE

## 2020-09-24 PROCEDURE — 85520 HEPARIN ASSAY: CPT

## 2020-09-24 RX ORDER — MAGNESIUM SULFATE 1 G/100ML
1 INJECTION INTRAVENOUS ONCE
Status: DISCONTINUED | OUTPATIENT
Start: 2020-09-24 | End: 2020-09-24

## 2020-09-24 RX ORDER — MAGNESIUM SULFATE HEPTAHYDRATE 500 MG/ML
2 INJECTION, SOLUTION INTRAMUSCULAR; INTRAVENOUS ONCE
Status: COMPLETED | OUTPATIENT
Start: 2020-09-24 | End: 2020-09-24

## 2020-09-24 RX ORDER — BUDESONIDE AND FORMOTEROL FUMARATE DIHYDRATE 160; 4.5 UG/1; UG/1
2 AEROSOL RESPIRATORY (INHALATION)
Status: DISCONTINUED | OUTPATIENT
Start: 2020-09-26 | End: 2020-10-01 | Stop reason: HOSPADM

## 2020-09-24 RX ORDER — BUDESONIDE AND FORMOTEROL FUMARATE DIHYDRATE 160; 4.5 UG/1; UG/1
2 AEROSOL RESPIRATORY (INHALATION)
Status: DISCONTINUED | OUTPATIENT
Start: 2020-09-24 | End: 2020-09-24

## 2020-09-24 RX ADMIN — INSULIN LISPRO 4 UNITS: 100 INJECTION, SOLUTION INTRAVENOUS; SUBCUTANEOUS at 17:04

## 2020-09-24 RX ADMIN — APIXABAN 5 MG: 5 TABLET, FILM COATED ORAL at 09:19

## 2020-09-24 RX ADMIN — MONTELUKAST SODIUM 10 MG: 10 TABLET, COATED ORAL at 20:24

## 2020-09-24 RX ADMIN — INSULIN LISPRO 2 UNITS: 100 INJECTION, SOLUTION INTRAVENOUS; SUBCUTANEOUS at 20:24

## 2020-09-24 RX ADMIN — IPRATROPIUM BROMIDE AND ALBUTEROL SULFATE 3 ML: 2.5; .5 SOLUTION RESPIRATORY (INHALATION) at 09:04

## 2020-09-24 RX ADMIN — PREDNISONE 40 MG: 20 TABLET ORAL at 09:19

## 2020-09-24 RX ADMIN — PANTOPRAZOLE SODIUM 40 MG: 40 TABLET, DELAYED RELEASE ORAL at 06:21

## 2020-09-24 RX ADMIN — TRIAMCINOLONE ACETONIDE: 1 CREAM TOPICAL at 09:21

## 2020-09-24 RX ADMIN — APIXABAN 5 MG: 5 TABLET, FILM COATED ORAL at 20:24

## 2020-09-24 RX ADMIN — TRIAMCINOLONE ACETONIDE: 1 CREAM TOPICAL at 20:25

## 2020-09-24 RX ADMIN — IPRATROPIUM BROMIDE AND ALBUTEROL SULFATE 3 ML: 2.5; .5 SOLUTION RESPIRATORY (INHALATION) at 12:46

## 2020-09-24 RX ADMIN — IPRATROPIUM BROMIDE AND ALBUTEROL SULFATE 3 ML: 2.5; .5 SOLUTION RESPIRATORY (INHALATION) at 19:07

## 2020-09-24 RX ADMIN — METOPROLOL TARTRATE 25 MG: 25 TABLET, FILM COATED ORAL at 20:24

## 2020-09-24 RX ADMIN — IPRATROPIUM BROMIDE AND ALBUTEROL SULFATE 3 ML: 2.5; .5 SOLUTION RESPIRATORY (INHALATION) at 16:36

## 2020-09-24 RX ADMIN — DOFETILIDE 500 MCG: 0.5 CAPSULE ORAL at 10:33

## 2020-09-24 RX ADMIN — INSULIN DETEMIR 10 UNITS: 100 INJECTION, SOLUTION SUBCUTANEOUS at 09:17

## 2020-09-24 RX ADMIN — GUAIFENESIN 600 MG: 600 TABLET, EXTENDED RELEASE ORAL at 20:24

## 2020-09-24 RX ADMIN — METOPROLOL TARTRATE 12.5 MG: 25 TABLET, FILM COATED ORAL at 09:21

## 2020-09-24 RX ADMIN — MAGNESIUM SULFATE HEPTAHYDRATE 2 G: 500 INJECTION, SOLUTION INTRAMUSCULAR; INTRAVENOUS at 21:51

## 2020-09-24 RX ADMIN — GUAIFENESIN 600 MG: 600 TABLET, EXTENDED RELEASE ORAL at 09:19

## 2020-09-24 RX ADMIN — Medication 5 MG/HR: at 09:18

## 2020-09-24 RX ADMIN — DOFETILIDE 500 MCG: 0.5 CAPSULE ORAL at 20:24

## 2020-09-25 LAB
ANION GAP SERPL CALCULATED.3IONS-SCNC: 5 MMOL/L (ref 5–15)
BASOPHILS # BLD AUTO: 0.01 10*3/MM3 (ref 0–0.2)
BASOPHILS NFR BLD AUTO: 0.1 % (ref 0–1.5)
BUN SERPL-MCNC: 19 MG/DL (ref 8–23)
BUN/CREAT SERPL: 34.5 (ref 7–25)
CALCIUM SPEC-SCNC: 8.7 MG/DL (ref 8.6–10.5)
CHLORIDE SERPL-SCNC: 101 MMOL/L (ref 98–107)
CO2 SERPL-SCNC: 34 MMOL/L (ref 22–29)
CREAT SERPL-MCNC: 0.55 MG/DL (ref 0.57–1)
DEPRECATED RDW RBC AUTO: 44.1 FL (ref 37–54)
EOSINOPHIL # BLD AUTO: 0.02 10*3/MM3 (ref 0–0.4)
EOSINOPHIL NFR BLD AUTO: 0.2 % (ref 0.3–6.2)
ERYTHROCYTE [DISTWIDTH] IN BLOOD BY AUTOMATED COUNT: 12.4 % (ref 12.3–15.4)
GFR SERPL CREATININE-BSD FRML MDRD: 112 ML/MIN/1.73
GLUCOSE BLDC GLUCOMTR-MCNC: 157 MG/DL (ref 70–130)
GLUCOSE BLDC GLUCOMTR-MCNC: 217 MG/DL (ref 70–130)
GLUCOSE BLDC GLUCOMTR-MCNC: 78 MG/DL (ref 70–130)
GLUCOSE BLDC GLUCOMTR-MCNC: 99 MG/DL (ref 70–130)
GLUCOSE SERPL-MCNC: 69 MG/DL (ref 65–99)
HCT VFR BLD AUTO: 38.9 % (ref 34–46.6)
HGB BLD-MCNC: 12 G/DL (ref 12–15.9)
IMM GRANULOCYTES # BLD AUTO: 0.05 10*3/MM3 (ref 0–0.05)
IMM GRANULOCYTES NFR BLD AUTO: 0.4 % (ref 0–0.5)
LYMPHOCYTES # BLD AUTO: 1.02 10*3/MM3 (ref 0.7–3.1)
LYMPHOCYTES NFR BLD AUTO: 8.9 % (ref 19.6–45.3)
MAGNESIUM SERPL-MCNC: 2.6 MG/DL (ref 1.6–2.4)
MCH RBC QN AUTO: 29.9 PG (ref 26.6–33)
MCHC RBC AUTO-ENTMCNC: 30.8 G/DL (ref 31.5–35.7)
MCV RBC AUTO: 96.8 FL (ref 79–97)
MONOCYTES # BLD AUTO: 0.99 10*3/MM3 (ref 0.1–0.9)
MONOCYTES NFR BLD AUTO: 8.6 % (ref 5–12)
NEUTROPHILS NFR BLD AUTO: 81.8 % (ref 42.7–76)
NEUTROPHILS NFR BLD AUTO: 9.42 10*3/MM3 (ref 1.7–7)
NRBC BLD AUTO-RTO: 0 /100 WBC (ref 0–0.2)
PLATELET # BLD AUTO: 210 10*3/MM3 (ref 140–450)
PMV BLD AUTO: 11.3 FL (ref 6–12)
POTASSIUM SERPL-SCNC: 4.3 MMOL/L (ref 3.5–5.2)
RBC # BLD AUTO: 4.02 10*6/MM3 (ref 3.77–5.28)
SODIUM SERPL-SCNC: 140 MMOL/L (ref 136–145)
WBC # BLD AUTO: 11.51 10*3/MM3 (ref 3.4–10.8)

## 2020-09-25 PROCEDURE — 82962 GLUCOSE BLOOD TEST: CPT

## 2020-09-25 PROCEDURE — G0108 DIAB MANAGE TRN  PER INDIV: HCPCS

## 2020-09-25 PROCEDURE — 97165 OT EVAL LOW COMPLEX 30 MIN: CPT

## 2020-09-25 PROCEDURE — 63710000001 INSULIN DETEMIR PER 5 UNITS: Performed by: INTERNAL MEDICINE

## 2020-09-25 PROCEDURE — 94799 UNLISTED PULMONARY SVC/PX: CPT

## 2020-09-25 PROCEDURE — 93010 ELECTROCARDIOGRAM REPORT: CPT | Performed by: INTERNAL MEDICINE

## 2020-09-25 PROCEDURE — 80048 BASIC METABOLIC PNL TOTAL CA: CPT | Performed by: INTERNAL MEDICINE

## 2020-09-25 PROCEDURE — 99232 SBSQ HOSP IP/OBS MODERATE 35: CPT | Performed by: INTERNAL MEDICINE

## 2020-09-25 PROCEDURE — 97530 THERAPEUTIC ACTIVITIES: CPT

## 2020-09-25 PROCEDURE — 97162 PT EVAL MOD COMPLEX 30 MIN: CPT

## 2020-09-25 PROCEDURE — 63710000001 PREDNISONE PER 1 MG: Performed by: INTERNAL MEDICINE

## 2020-09-25 PROCEDURE — 93005 ELECTROCARDIOGRAM TRACING: CPT | Performed by: INTERNAL MEDICINE

## 2020-09-25 PROCEDURE — 85025 COMPLETE CBC W/AUTO DIFF WBC: CPT | Performed by: INTERNAL MEDICINE

## 2020-09-25 PROCEDURE — 83735 ASSAY OF MAGNESIUM: CPT | Performed by: INTERNAL MEDICINE

## 2020-09-25 PROCEDURE — 63710000001 INSULIN LISPRO (HUMAN) PER 5 UNITS: Performed by: INTERNAL MEDICINE

## 2020-09-25 RX ORDER — LISINOPRIL 5 MG/1
5 TABLET ORAL
Status: DISCONTINUED | OUTPATIENT
Start: 2020-09-25 | End: 2020-09-28

## 2020-09-25 RX ADMIN — MONTELUKAST SODIUM 10 MG: 10 TABLET, COATED ORAL at 20:00

## 2020-09-25 RX ADMIN — GUAIFENESIN 600 MG: 600 TABLET, EXTENDED RELEASE ORAL at 08:07

## 2020-09-25 RX ADMIN — ACETAMINOPHEN 650 MG: 325 TABLET, FILM COATED ORAL at 19:54

## 2020-09-25 RX ADMIN — APIXABAN 5 MG: 5 TABLET, FILM COATED ORAL at 08:07

## 2020-09-25 RX ADMIN — IPRATROPIUM BROMIDE AND ALBUTEROL SULFATE 3 ML: 2.5; .5 SOLUTION RESPIRATORY (INHALATION) at 08:18

## 2020-09-25 RX ADMIN — LISINOPRIL 5 MG: 5 TABLET ORAL at 12:03

## 2020-09-25 RX ADMIN — SODIUM CHLORIDE, PRESERVATIVE FREE 10 ML: 5 INJECTION INTRAVENOUS at 20:02

## 2020-09-25 RX ADMIN — TRIAMCINOLONE ACETONIDE: 1 CREAM TOPICAL at 08:07

## 2020-09-25 RX ADMIN — PREDNISONE 40 MG: 20 TABLET ORAL at 08:07

## 2020-09-25 RX ADMIN — ACETAMINOPHEN 650 MG: 325 TABLET, FILM COATED ORAL at 04:53

## 2020-09-25 RX ADMIN — METOPROLOL TARTRATE 25 MG: 25 TABLET, FILM COATED ORAL at 20:00

## 2020-09-25 RX ADMIN — IPRATROPIUM BROMIDE AND ALBUTEROL SULFATE 3 ML: 2.5; .5 SOLUTION RESPIRATORY (INHALATION) at 15:43

## 2020-09-25 RX ADMIN — APIXABAN 5 MG: 5 TABLET, FILM COATED ORAL at 20:00

## 2020-09-25 RX ADMIN — IPRATROPIUM BROMIDE AND ALBUTEROL SULFATE 3 ML: 2.5; .5 SOLUTION RESPIRATORY (INHALATION) at 18:52

## 2020-09-25 RX ADMIN — METOPROLOL TARTRATE 25 MG: 25 TABLET, FILM COATED ORAL at 08:07

## 2020-09-25 RX ADMIN — PANTOPRAZOLE SODIUM 40 MG: 40 TABLET, DELAYED RELEASE ORAL at 04:53

## 2020-09-25 RX ADMIN — TRIAMCINOLONE ACETONIDE: 1 CREAM TOPICAL at 20:01

## 2020-09-25 RX ADMIN — INSULIN DETEMIR 10 UNITS: 100 INJECTION, SOLUTION SUBCUTANEOUS at 08:07

## 2020-09-25 RX ADMIN — GUAIFENESIN 600 MG: 600 TABLET, EXTENDED RELEASE ORAL at 20:00

## 2020-09-25 RX ADMIN — IPRATROPIUM BROMIDE AND ALBUTEROL SULFATE 3 ML: 2.5; .5 SOLUTION RESPIRATORY (INHALATION) at 12:21

## 2020-09-25 RX ADMIN — INSULIN LISPRO 4 UNITS: 100 INJECTION, SOLUTION INTRAVENOUS; SUBCUTANEOUS at 20:00

## 2020-09-25 RX ADMIN — METOROPROLOL TARTRATE 5 MG: 5 INJECTION, SOLUTION INTRAVENOUS at 21:23

## 2020-09-26 LAB
ANION GAP SERPL CALCULATED.3IONS-SCNC: 6 MMOL/L (ref 5–15)
BACTERIA SPEC AEROBE CULT: NORMAL
BACTERIA SPEC AEROBE CULT: NORMAL
BUN SERPL-MCNC: 16 MG/DL (ref 8–23)
BUN/CREAT SERPL: 28.1 (ref 7–25)
CALCIUM SPEC-SCNC: 8.7 MG/DL (ref 8.6–10.5)
CHLORIDE SERPL-SCNC: 100 MMOL/L (ref 98–107)
CO2 SERPL-SCNC: 32 MMOL/L (ref 22–29)
CREAT SERPL-MCNC: 0.57 MG/DL (ref 0.57–1)
GFR SERPL CREATININE-BSD FRML MDRD: 107 ML/MIN/1.73
GLUCOSE BLDC GLUCOMTR-MCNC: 133 MG/DL (ref 70–130)
GLUCOSE BLDC GLUCOMTR-MCNC: 180 MG/DL (ref 70–130)
GLUCOSE BLDC GLUCOMTR-MCNC: 75 MG/DL (ref 70–130)
GLUCOSE BLDC GLUCOMTR-MCNC: 92 MG/DL (ref 70–130)
GLUCOSE SERPL-MCNC: 76 MG/DL (ref 65–99)
MAGNESIUM SERPL-MCNC: 2.1 MG/DL (ref 1.6–2.4)
POTASSIUM SERPL-SCNC: 4.2 MMOL/L (ref 3.5–5.2)
SODIUM SERPL-SCNC: 138 MMOL/L (ref 136–145)

## 2020-09-26 PROCEDURE — 25010000002 DIGOXIN PER 500 MCG: Performed by: INTERNAL MEDICINE

## 2020-09-26 PROCEDURE — 63710000001 INSULIN DETEMIR PER 5 UNITS: Performed by: INTERNAL MEDICINE

## 2020-09-26 PROCEDURE — 82962 GLUCOSE BLOOD TEST: CPT

## 2020-09-26 PROCEDURE — 99233 SBSQ HOSP IP/OBS HIGH 50: CPT | Performed by: INTERNAL MEDICINE

## 2020-09-26 PROCEDURE — 83735 ASSAY OF MAGNESIUM: CPT | Performed by: INTERNAL MEDICINE

## 2020-09-26 PROCEDURE — 63710000001 INSULIN LISPRO (HUMAN) PER 5 UNITS: Performed by: INTERNAL MEDICINE

## 2020-09-26 PROCEDURE — 94799 UNLISTED PULMONARY SVC/PX: CPT

## 2020-09-26 PROCEDURE — 80048 BASIC METABOLIC PNL TOTAL CA: CPT | Performed by: INTERNAL MEDICINE

## 2020-09-26 PROCEDURE — 93010 ELECTROCARDIOGRAM REPORT: CPT | Performed by: INTERNAL MEDICINE

## 2020-09-26 PROCEDURE — 93005 ELECTROCARDIOGRAM TRACING: CPT | Performed by: INTERNAL MEDICINE

## 2020-09-26 PROCEDURE — 94640 AIRWAY INHALATION TREATMENT: CPT

## 2020-09-26 RX ORDER — DIGOXIN 0.25 MG/ML
0.25 INJECTION INTRAMUSCULAR; INTRAVENOUS ONCE
Status: COMPLETED | OUTPATIENT
Start: 2020-09-26 | End: 2020-09-26

## 2020-09-26 RX ADMIN — SODIUM CHLORIDE, PRESERVATIVE FREE 10 ML: 5 INJECTION INTRAVENOUS at 20:14

## 2020-09-26 RX ADMIN — IPRATROPIUM BROMIDE AND ALBUTEROL SULFATE 3 ML: 2.5; .5 SOLUTION RESPIRATORY (INHALATION) at 20:45

## 2020-09-26 RX ADMIN — ALPRAZOLAM 0.25 MG: 0.25 TABLET ORAL at 14:28

## 2020-09-26 RX ADMIN — TRIAMCINOLONE ACETONIDE: 1 CREAM TOPICAL at 20:14

## 2020-09-26 RX ADMIN — PANTOPRAZOLE SODIUM 40 MG: 40 TABLET, DELAYED RELEASE ORAL at 05:29

## 2020-09-26 RX ADMIN — DILTIAZEM HYDROCHLORIDE 90 MG: 60 TABLET, FILM COATED ORAL at 12:56

## 2020-09-26 RX ADMIN — BUDESONIDE AND FORMOTEROL FUMARATE DIHYDRATE 2 PUFF: 160; 4.5 AEROSOL RESPIRATORY (INHALATION) at 20:45

## 2020-09-26 RX ADMIN — METOPROLOL TARTRATE 25 MG: 25 TABLET, FILM COATED ORAL at 09:34

## 2020-09-26 RX ADMIN — APIXABAN 5 MG: 5 TABLET, FILM COATED ORAL at 09:34

## 2020-09-26 RX ADMIN — DIGOXIN 0.25 MG: 0.25 INJECTION INTRAMUSCULAR; INTRAVENOUS at 06:31

## 2020-09-26 RX ADMIN — IPRATROPIUM BROMIDE AND ALBUTEROL SULFATE 3 ML: 2.5; .5 SOLUTION RESPIRATORY (INHALATION) at 13:31

## 2020-09-26 RX ADMIN — IPRATROPIUM BROMIDE AND ALBUTEROL SULFATE 3 ML: 2.5; .5 SOLUTION RESPIRATORY (INHALATION) at 09:50

## 2020-09-26 RX ADMIN — MONTELUKAST SODIUM 10 MG: 10 TABLET, COATED ORAL at 20:14

## 2020-09-26 RX ADMIN — DILTIAZEM HYDROCHLORIDE 90 MG: 60 TABLET, FILM COATED ORAL at 18:10

## 2020-09-26 RX ADMIN — IPRATROPIUM BROMIDE AND ALBUTEROL SULFATE 3 ML: 2.5; .5 SOLUTION RESPIRATORY (INHALATION) at 17:05

## 2020-09-26 RX ADMIN — INSULIN LISPRO 2 UNITS: 100 INJECTION, SOLUTION INTRAVENOUS; SUBCUTANEOUS at 20:31

## 2020-09-26 RX ADMIN — BUDESONIDE AND FORMOTEROL FUMARATE DIHYDRATE 2 PUFF: 160; 4.5 AEROSOL RESPIRATORY (INHALATION) at 09:50

## 2020-09-26 RX ADMIN — INSULIN DETEMIR 10 UNITS: 100 INJECTION, SOLUTION SUBCUTANEOUS at 09:37

## 2020-09-26 RX ADMIN — APIXABAN 5 MG: 5 TABLET, FILM COATED ORAL at 20:14

## 2020-09-26 RX ADMIN — LISINOPRIL 5 MG: 5 TABLET ORAL at 09:34

## 2020-09-27 LAB
ANION GAP SERPL CALCULATED.3IONS-SCNC: 8 MMOL/L (ref 5–15)
BUN SERPL-MCNC: 14 MG/DL (ref 8–23)
BUN/CREAT SERPL: 24.1 (ref 7–25)
CALCIUM SPEC-SCNC: 8.4 MG/DL (ref 8.6–10.5)
CHLORIDE SERPL-SCNC: 101 MMOL/L (ref 98–107)
CO2 SERPL-SCNC: 32 MMOL/L (ref 22–29)
CREAT SERPL-MCNC: 0.58 MG/DL (ref 0.57–1)
GFR SERPL CREATININE-BSD FRML MDRD: 105 ML/MIN/1.73
GLUCOSE BLDC GLUCOMTR-MCNC: 128 MG/DL (ref 70–130)
GLUCOSE BLDC GLUCOMTR-MCNC: 138 MG/DL (ref 70–130)
GLUCOSE BLDC GLUCOMTR-MCNC: 141 MG/DL (ref 70–130)
GLUCOSE BLDC GLUCOMTR-MCNC: 96 MG/DL (ref 70–130)
GLUCOSE SERPL-MCNC: 100 MG/DL (ref 65–99)
MAGNESIUM SERPL-MCNC: 2.1 MG/DL (ref 1.6–2.4)
POTASSIUM SERPL-SCNC: 4.4 MMOL/L (ref 3.5–5.2)
SODIUM SERPL-SCNC: 141 MMOL/L (ref 136–145)

## 2020-09-27 PROCEDURE — 82962 GLUCOSE BLOOD TEST: CPT

## 2020-09-27 PROCEDURE — 99232 SBSQ HOSP IP/OBS MODERATE 35: CPT | Performed by: INTERNAL MEDICINE

## 2020-09-27 PROCEDURE — 93005 ELECTROCARDIOGRAM TRACING: CPT | Performed by: INTERNAL MEDICINE

## 2020-09-27 PROCEDURE — 93010 ELECTROCARDIOGRAM REPORT: CPT | Performed by: INTERNAL MEDICINE

## 2020-09-27 PROCEDURE — 94760 N-INVAS EAR/PLS OXIMETRY 1: CPT

## 2020-09-27 PROCEDURE — 83735 ASSAY OF MAGNESIUM: CPT | Performed by: INTERNAL MEDICINE

## 2020-09-27 PROCEDURE — 94799 UNLISTED PULMONARY SVC/PX: CPT

## 2020-09-27 PROCEDURE — 63710000001 INSULIN DETEMIR PER 5 UNITS: Performed by: INTERNAL MEDICINE

## 2020-09-27 PROCEDURE — 80048 BASIC METABOLIC PNL TOTAL CA: CPT | Performed by: INTERNAL MEDICINE

## 2020-09-27 RX ORDER — DILTIAZEM HYDROCHLORIDE 60 MG/1
120 TABLET, FILM COATED ORAL EVERY 6 HOURS SCHEDULED
Status: COMPLETED | OUTPATIENT
Start: 2020-09-27 | End: 2020-09-28

## 2020-09-27 RX ADMIN — ALPRAZOLAM 0.25 MG: 0.25 TABLET ORAL at 21:27

## 2020-09-27 RX ADMIN — BUDESONIDE AND FORMOTEROL FUMARATE DIHYDRATE 2 PUFF: 160; 4.5 AEROSOL RESPIRATORY (INHALATION) at 20:24

## 2020-09-27 RX ADMIN — APIXABAN 5 MG: 5 TABLET, FILM COATED ORAL at 20:03

## 2020-09-27 RX ADMIN — LISINOPRIL 5 MG: 5 TABLET ORAL at 08:50

## 2020-09-27 RX ADMIN — DILTIAZEM HYDROCHLORIDE 90 MG: 60 TABLET, FILM COATED ORAL at 00:31

## 2020-09-27 RX ADMIN — IPRATROPIUM BROMIDE AND ALBUTEROL SULFATE 3 ML: 2.5; .5 SOLUTION RESPIRATORY (INHALATION) at 20:24

## 2020-09-27 RX ADMIN — PANTOPRAZOLE SODIUM 40 MG: 40 TABLET, DELAYED RELEASE ORAL at 05:52

## 2020-09-27 RX ADMIN — IPRATROPIUM BROMIDE AND ALBUTEROL SULFATE 3 ML: 2.5; .5 SOLUTION RESPIRATORY (INHALATION) at 16:34

## 2020-09-27 RX ADMIN — INSULIN DETEMIR 10 UNITS: 100 INJECTION, SOLUTION SUBCUTANEOUS at 08:50

## 2020-09-27 RX ADMIN — BUDESONIDE AND FORMOTEROL FUMARATE DIHYDRATE 2 PUFF: 160; 4.5 AEROSOL RESPIRATORY (INHALATION) at 09:06

## 2020-09-27 RX ADMIN — DILTIAZEM HYDROCHLORIDE 120 MG: 60 TABLET, FILM COATED ORAL at 17:50

## 2020-09-27 RX ADMIN — DILTIAZEM HYDROCHLORIDE 90 MG: 60 TABLET, FILM COATED ORAL at 12:43

## 2020-09-27 RX ADMIN — MONTELUKAST SODIUM 10 MG: 10 TABLET, COATED ORAL at 20:03

## 2020-09-27 RX ADMIN — DILTIAZEM HYDROCHLORIDE 90 MG: 60 TABLET, FILM COATED ORAL at 05:51

## 2020-09-27 RX ADMIN — APIXABAN 5 MG: 5 TABLET, FILM COATED ORAL at 08:50

## 2020-09-27 RX ADMIN — IPRATROPIUM BROMIDE AND ALBUTEROL SULFATE 3 ML: 2.5; .5 SOLUTION RESPIRATORY (INHALATION) at 13:21

## 2020-09-27 RX ADMIN — DILTIAZEM HYDROCHLORIDE 120 MG: 60 TABLET, FILM COATED ORAL at 23:51

## 2020-09-27 RX ADMIN — IPRATROPIUM BROMIDE AND ALBUTEROL SULFATE 3 ML: 2.5; .5 SOLUTION RESPIRATORY (INHALATION) at 09:06

## 2020-09-28 PROBLEM — I50.22 CHRONIC SYSTOLIC CHF (CONGESTIVE HEART FAILURE): Status: ACTIVE | Noted: 2020-09-28

## 2020-09-28 LAB
ANION GAP SERPL CALCULATED.3IONS-SCNC: 7 MMOL/L (ref 5–15)
BASOPHILS # BLD AUTO: 0.04 10*3/MM3 (ref 0–0.2)
BASOPHILS NFR BLD AUTO: 0.3 % (ref 0–1.5)
BUN SERPL-MCNC: 18 MG/DL (ref 8–23)
BUN/CREAT SERPL: 30.5 (ref 7–25)
CALCIUM SPEC-SCNC: 9.1 MG/DL (ref 8.6–10.5)
CHLORIDE SERPL-SCNC: 98 MMOL/L (ref 98–107)
CO2 SERPL-SCNC: 34 MMOL/L (ref 22–29)
CREAT SERPL-MCNC: 0.59 MG/DL (ref 0.57–1)
DEPRECATED RDW RBC AUTO: 43.5 FL (ref 37–54)
EOSINOPHIL # BLD AUTO: 0.21 10*3/MM3 (ref 0–0.4)
EOSINOPHIL NFR BLD AUTO: 1.8 % (ref 0.3–6.2)
ERYTHROCYTE [DISTWIDTH] IN BLOOD BY AUTOMATED COUNT: 12.3 % (ref 12.3–15.4)
GFR SERPL CREATININE-BSD FRML MDRD: 103 ML/MIN/1.73
GLUCOSE BLDC GLUCOMTR-MCNC: 125 MG/DL (ref 70–130)
GLUCOSE BLDC GLUCOMTR-MCNC: 137 MG/DL (ref 70–130)
GLUCOSE BLDC GLUCOMTR-MCNC: 158 MG/DL (ref 70–130)
GLUCOSE BLDC GLUCOMTR-MCNC: 87 MG/DL (ref 70–130)
GLUCOSE SERPL-MCNC: 125 MG/DL (ref 65–99)
HCT VFR BLD AUTO: 46.4 % (ref 34–46.6)
HGB BLD-MCNC: 14.5 G/DL (ref 12–15.9)
IMM GRANULOCYTES # BLD AUTO: 0.05 10*3/MM3 (ref 0–0.05)
IMM GRANULOCYTES NFR BLD AUTO: 0.4 % (ref 0–0.5)
LYMPHOCYTES # BLD AUTO: 1.93 10*3/MM3 (ref 0.7–3.1)
LYMPHOCYTES NFR BLD AUTO: 16.4 % (ref 19.6–45.3)
MAGNESIUM SERPL-MCNC: 2.2 MG/DL (ref 1.6–2.4)
MCH RBC QN AUTO: 30.3 PG (ref 26.6–33)
MCHC RBC AUTO-ENTMCNC: 31.3 G/DL (ref 31.5–35.7)
MCV RBC AUTO: 96.9 FL (ref 79–97)
MONOCYTES # BLD AUTO: 1.1 10*3/MM3 (ref 0.1–0.9)
MONOCYTES NFR BLD AUTO: 9.4 % (ref 5–12)
NEUTROPHILS NFR BLD AUTO: 71.7 % (ref 42.7–76)
NEUTROPHILS NFR BLD AUTO: 8.41 10*3/MM3 (ref 1.7–7)
NRBC BLD AUTO-RTO: 0 /100 WBC (ref 0–0.2)
PLATELET # BLD AUTO: 278 10*3/MM3 (ref 140–450)
PMV BLD AUTO: 10.8 FL (ref 6–12)
POTASSIUM SERPL-SCNC: 5 MMOL/L (ref 3.5–5.2)
RBC # BLD AUTO: 4.79 10*6/MM3 (ref 3.77–5.28)
SODIUM SERPL-SCNC: 139 MMOL/L (ref 136–145)
WBC # BLD AUTO: 11.74 10*3/MM3 (ref 3.4–10.8)

## 2020-09-28 PROCEDURE — 93005 ELECTROCARDIOGRAM TRACING: CPT | Performed by: INTERNAL MEDICINE

## 2020-09-28 PROCEDURE — 97530 THERAPEUTIC ACTIVITIES: CPT

## 2020-09-28 PROCEDURE — 83735 ASSAY OF MAGNESIUM: CPT | Performed by: INTERNAL MEDICINE

## 2020-09-28 PROCEDURE — 63710000001 INSULIN DETEMIR PER 5 UNITS: Performed by: INTERNAL MEDICINE

## 2020-09-28 PROCEDURE — 85025 COMPLETE CBC W/AUTO DIFF WBC: CPT | Performed by: INTERNAL MEDICINE

## 2020-09-28 PROCEDURE — 99232 SBSQ HOSP IP/OBS MODERATE 35: CPT | Performed by: INTERNAL MEDICINE

## 2020-09-28 PROCEDURE — 63710000001 INSULIN LISPRO (HUMAN) PER 5 UNITS: Performed by: INTERNAL MEDICINE

## 2020-09-28 PROCEDURE — 82962 GLUCOSE BLOOD TEST: CPT

## 2020-09-28 PROCEDURE — 94760 N-INVAS EAR/PLS OXIMETRY 1: CPT

## 2020-09-28 PROCEDURE — 80048 BASIC METABOLIC PNL TOTAL CA: CPT | Performed by: INTERNAL MEDICINE

## 2020-09-28 PROCEDURE — 93010 ELECTROCARDIOGRAM REPORT: CPT | Performed by: INTERNAL MEDICINE

## 2020-09-28 PROCEDURE — 94799 UNLISTED PULMONARY SVC/PX: CPT

## 2020-09-28 PROCEDURE — 97116 GAIT TRAINING THERAPY: CPT

## 2020-09-28 RX ORDER — LISINOPRIL 5 MG/1
2.5 TABLET ORAL
Status: DISCONTINUED | OUTPATIENT
Start: 2020-09-29 | End: 2020-09-28

## 2020-09-28 RX ADMIN — APIXABAN 5 MG: 5 TABLET, FILM COATED ORAL at 08:13

## 2020-09-28 RX ADMIN — DILTIAZEM HYDROCHLORIDE 120 MG: 60 TABLET, FILM COATED ORAL at 17:04

## 2020-09-28 RX ADMIN — INSULIN LISPRO 2 UNITS: 100 INJECTION, SOLUTION INTRAVENOUS; SUBCUTANEOUS at 12:06

## 2020-09-28 RX ADMIN — INSULIN DETEMIR 10 UNITS: 100 INJECTION, SOLUTION SUBCUTANEOUS at 08:13

## 2020-09-28 RX ADMIN — IPRATROPIUM BROMIDE AND ALBUTEROL SULFATE 3 ML: 2.5; .5 SOLUTION RESPIRATORY (INHALATION) at 12:47

## 2020-09-28 RX ADMIN — DILTIAZEM HYDROCHLORIDE 120 MG: 60 TABLET, FILM COATED ORAL at 05:52

## 2020-09-28 RX ADMIN — BUDESONIDE AND FORMOTEROL FUMARATE DIHYDRATE 2 PUFF: 160; 4.5 AEROSOL RESPIRATORY (INHALATION) at 08:51

## 2020-09-28 RX ADMIN — DILTIAZEM HYDROCHLORIDE 120 MG: 60 TABLET, FILM COATED ORAL at 21:41

## 2020-09-28 RX ADMIN — BUDESONIDE AND FORMOTEROL FUMARATE DIHYDRATE 2 PUFF: 160; 4.5 AEROSOL RESPIRATORY (INHALATION) at 19:50

## 2020-09-28 RX ADMIN — LISINOPRIL 5 MG: 5 TABLET ORAL at 08:13

## 2020-09-28 RX ADMIN — IPRATROPIUM BROMIDE AND ALBUTEROL SULFATE 3 ML: 2.5; .5 SOLUTION RESPIRATORY (INHALATION) at 19:49

## 2020-09-28 RX ADMIN — APIXABAN 5 MG: 5 TABLET, FILM COATED ORAL at 21:41

## 2020-09-28 RX ADMIN — IPRATROPIUM BROMIDE AND ALBUTEROL SULFATE 3 ML: 2.5; .5 SOLUTION RESPIRATORY (INHALATION) at 08:50

## 2020-09-28 RX ADMIN — PANTOPRAZOLE SODIUM 40 MG: 40 TABLET, DELAYED RELEASE ORAL at 05:52

## 2020-09-28 RX ADMIN — SODIUM CHLORIDE, PRESERVATIVE FREE 10 ML: 5 INJECTION INTRAVENOUS at 21:42

## 2020-09-28 RX ADMIN — DILTIAZEM HYDROCHLORIDE 120 MG: 60 TABLET, FILM COATED ORAL at 12:06

## 2020-09-28 RX ADMIN — MONTELUKAST SODIUM 10 MG: 10 TABLET, COATED ORAL at 21:41

## 2020-09-29 PROBLEM — I50.21 ACUTE SYSTOLIC CHF (CONGESTIVE HEART FAILURE) (HCC): Status: ACTIVE | Noted: 2020-09-28

## 2020-09-29 LAB
ANION GAP SERPL CALCULATED.3IONS-SCNC: 8 MMOL/L (ref 5–15)
BASOPHILS # BLD AUTO: 0.03 10*3/MM3 (ref 0–0.2)
BASOPHILS NFR BLD AUTO: 0.3 % (ref 0–1.5)
BUN SERPL-MCNC: 16 MG/DL (ref 8–23)
BUN/CREAT SERPL: 26.7 (ref 7–25)
CALCIUM SPEC-SCNC: 9 MG/DL (ref 8.6–10.5)
CHLORIDE SERPL-SCNC: 101 MMOL/L (ref 98–107)
CO2 SERPL-SCNC: 30 MMOL/L (ref 22–29)
CREAT SERPL-MCNC: 0.6 MG/DL (ref 0.57–1)
DEPRECATED RDW RBC AUTO: 43.8 FL (ref 37–54)
EOSINOPHIL # BLD AUTO: 0.14 10*3/MM3 (ref 0–0.4)
EOSINOPHIL NFR BLD AUTO: 1.3 % (ref 0.3–6.2)
ERYTHROCYTE [DISTWIDTH] IN BLOOD BY AUTOMATED COUNT: 12.4 % (ref 12.3–15.4)
GFR SERPL CREATININE-BSD FRML MDRD: 101 ML/MIN/1.73
GLUCOSE BLDC GLUCOMTR-MCNC: 108 MG/DL (ref 70–130)
GLUCOSE BLDC GLUCOMTR-MCNC: 109 MG/DL (ref 70–130)
GLUCOSE BLDC GLUCOMTR-MCNC: 115 MG/DL (ref 70–130)
GLUCOSE BLDC GLUCOMTR-MCNC: 187 MG/DL (ref 70–130)
GLUCOSE SERPL-MCNC: 116 MG/DL (ref 65–99)
HCT VFR BLD AUTO: 44.8 % (ref 34–46.6)
HGB BLD-MCNC: 14 G/DL (ref 12–15.9)
IMM GRANULOCYTES # BLD AUTO: 0.07 10*3/MM3 (ref 0–0.05)
IMM GRANULOCYTES NFR BLD AUTO: 0.7 % (ref 0–0.5)
LYMPHOCYTES # BLD AUTO: 1.89 10*3/MM3 (ref 0.7–3.1)
LYMPHOCYTES NFR BLD AUTO: 17.6 % (ref 19.6–45.3)
MAGNESIUM SERPL-MCNC: 2.2 MG/DL (ref 1.6–2.4)
MCH RBC QN AUTO: 30 PG (ref 26.6–33)
MCHC RBC AUTO-ENTMCNC: 31.3 G/DL (ref 31.5–35.7)
MCV RBC AUTO: 96.1 FL (ref 79–97)
MONOCYTES # BLD AUTO: 1.02 10*3/MM3 (ref 0.1–0.9)
MONOCYTES NFR BLD AUTO: 9.5 % (ref 5–12)
NEUTROPHILS NFR BLD AUTO: 7.61 10*3/MM3 (ref 1.7–7)
NEUTROPHILS NFR BLD AUTO: 70.6 % (ref 42.7–76)
NRBC BLD AUTO-RTO: 0 /100 WBC (ref 0–0.2)
PHOSPHATE SERPL-MCNC: 3.8 MG/DL (ref 2.5–4.5)
PLATELET # BLD AUTO: 271 10*3/MM3 (ref 140–450)
PMV BLD AUTO: 10.6 FL (ref 6–12)
POTASSIUM SERPL-SCNC: 4.6 MMOL/L (ref 3.5–5.2)
RBC # BLD AUTO: 4.66 10*6/MM3 (ref 3.77–5.28)
SODIUM SERPL-SCNC: 139 MMOL/L (ref 136–145)
WBC # BLD AUTO: 10.76 10*3/MM3 (ref 3.4–10.8)

## 2020-09-29 PROCEDURE — 63710000001 INSULIN DETEMIR PER 5 UNITS: Performed by: INTERNAL MEDICINE

## 2020-09-29 PROCEDURE — 82962 GLUCOSE BLOOD TEST: CPT

## 2020-09-29 PROCEDURE — 94799 UNLISTED PULMONARY SVC/PX: CPT

## 2020-09-29 PROCEDURE — 99232 SBSQ HOSP IP/OBS MODERATE 35: CPT | Performed by: INTERNAL MEDICINE

## 2020-09-29 PROCEDURE — 83735 ASSAY OF MAGNESIUM: CPT | Performed by: INTERNAL MEDICINE

## 2020-09-29 PROCEDURE — 85025 COMPLETE CBC W/AUTO DIFF WBC: CPT | Performed by: INTERNAL MEDICINE

## 2020-09-29 PROCEDURE — 63710000001 INSULIN LISPRO (HUMAN) PER 5 UNITS: Performed by: INTERNAL MEDICINE

## 2020-09-29 PROCEDURE — 99233 SBSQ HOSP IP/OBS HIGH 50: CPT | Performed by: HOSPITALIST

## 2020-09-29 PROCEDURE — 84100 ASSAY OF PHOSPHORUS: CPT | Performed by: INTERNAL MEDICINE

## 2020-09-29 PROCEDURE — 80048 BASIC METABOLIC PNL TOTAL CA: CPT | Performed by: INTERNAL MEDICINE

## 2020-09-29 RX ORDER — DILTIAZEM HYDROCHLORIDE 60 MG/1
120 TABLET, FILM COATED ORAL EVERY 6 HOURS SCHEDULED
Status: DISCONTINUED | OUTPATIENT
Start: 2020-09-30 | End: 2020-09-30

## 2020-09-29 RX ADMIN — APIXABAN 5 MG: 5 TABLET, FILM COATED ORAL at 22:12

## 2020-09-29 RX ADMIN — PANTOPRAZOLE SODIUM 40 MG: 40 TABLET, DELAYED RELEASE ORAL at 05:11

## 2020-09-29 RX ADMIN — IPRATROPIUM BROMIDE AND ALBUTEROL SULFATE 3 ML: 2.5; .5 SOLUTION RESPIRATORY (INHALATION) at 08:47

## 2020-09-29 RX ADMIN — SODIUM CHLORIDE, PRESERVATIVE FREE 10 ML: 5 INJECTION INTRAVENOUS at 22:14

## 2020-09-29 RX ADMIN — DILTIAZEM HYDROCHLORIDE 480 MG: 180 CAPSULE, COATED, EXTENDED RELEASE ORAL at 09:48

## 2020-09-29 RX ADMIN — MONTELUKAST SODIUM 10 MG: 10 TABLET, COATED ORAL at 22:12

## 2020-09-29 RX ADMIN — IPRATROPIUM BROMIDE AND ALBUTEROL SULFATE 3 ML: 2.5; .5 SOLUTION RESPIRATORY (INHALATION) at 12:27

## 2020-09-29 RX ADMIN — APIXABAN 5 MG: 5 TABLET, FILM COATED ORAL at 09:48

## 2020-09-29 RX ADMIN — IPRATROPIUM BROMIDE AND ALBUTEROL SULFATE 3 ML: 2.5; .5 SOLUTION RESPIRATORY (INHALATION) at 16:08

## 2020-09-29 RX ADMIN — INSULIN LISPRO 2 UNITS: 100 INJECTION, SOLUTION INTRAVENOUS; SUBCUTANEOUS at 22:13

## 2020-09-29 RX ADMIN — IPRATROPIUM BROMIDE AND ALBUTEROL SULFATE 3 ML: 2.5; .5 SOLUTION RESPIRATORY (INHALATION) at 20:22

## 2020-09-29 RX ADMIN — BUDESONIDE AND FORMOTEROL FUMARATE DIHYDRATE 2 PUFF: 160; 4.5 AEROSOL RESPIRATORY (INHALATION) at 12:27

## 2020-09-29 RX ADMIN — SODIUM CHLORIDE, PRESERVATIVE FREE 10 ML: 5 INJECTION INTRAVENOUS at 09:49

## 2020-09-29 RX ADMIN — ALPRAZOLAM 0.25 MG: 0.25 TABLET ORAL at 22:13

## 2020-09-29 RX ADMIN — INSULIN DETEMIR 10 UNITS: 100 INJECTION, SOLUTION SUBCUTANEOUS at 09:49

## 2020-09-30 ENCOUNTER — APPOINTMENT (OUTPATIENT)
Dept: GENERAL RADIOLOGY | Facility: HOSPITAL | Age: 60
End: 2020-09-30

## 2020-09-30 LAB
ANION GAP SERPL CALCULATED.3IONS-SCNC: 6 MMOL/L (ref 5–15)
BUN SERPL-MCNC: 16 MG/DL (ref 8–23)
BUN/CREAT SERPL: 23.2 (ref 7–25)
CALCIUM SPEC-SCNC: 8.8 MG/DL (ref 8.6–10.5)
CHLORIDE SERPL-SCNC: 103 MMOL/L (ref 98–107)
CO2 SERPL-SCNC: 31 MMOL/L (ref 22–29)
CREAT SERPL-MCNC: 0.69 MG/DL (ref 0.57–1)
GFR SERPL CREATININE-BSD FRML MDRD: 86 ML/MIN/1.73
GLUCOSE BLDC GLUCOMTR-MCNC: 102 MG/DL (ref 70–130)
GLUCOSE BLDC GLUCOMTR-MCNC: 114 MG/DL (ref 70–130)
GLUCOSE BLDC GLUCOMTR-MCNC: 130 MG/DL (ref 70–130)
GLUCOSE BLDC GLUCOMTR-MCNC: 251 MG/DL (ref 70–130)
GLUCOSE SERPL-MCNC: 98 MG/DL (ref 65–99)
MAGNESIUM SERPL-MCNC: 2.1 MG/DL (ref 1.6–2.4)
POTASSIUM SERPL-SCNC: 4.4 MMOL/L (ref 3.5–5.2)
SODIUM SERPL-SCNC: 140 MMOL/L (ref 136–145)

## 2020-09-30 PROCEDURE — 02HK3NZ INSERTION OF INTRACARDIAC PACEMAKER INTO RIGHT VENTRICLE, PERCUTANEOUS APPROACH: ICD-10-PCS | Performed by: INTERNAL MEDICINE

## 2020-09-30 PROCEDURE — 63710000001 INSULIN DETEMIR PER 5 UNITS: Performed by: INTERNAL MEDICINE

## 2020-09-30 PROCEDURE — 93650 ICAR CATH ABLTJ AV NODE FUNC: CPT | Performed by: INTERNAL MEDICINE

## 2020-09-30 PROCEDURE — C1769 GUIDE WIRE: HCPCS | Performed by: INTERNAL MEDICINE

## 2020-09-30 PROCEDURE — 97116 GAIT TRAINING THERAPY: CPT

## 2020-09-30 PROCEDURE — C1786 PMKR, SINGLE, RATE-RESP: HCPCS | Performed by: INTERNAL MEDICINE

## 2020-09-30 PROCEDURE — 94799 UNLISTED PULMONARY SVC/PX: CPT

## 2020-09-30 PROCEDURE — C1894 INTRO/SHEATH, NON-LASER: HCPCS | Performed by: INTERNAL MEDICINE

## 2020-09-30 PROCEDURE — 99153 MOD SED SAME PHYS/QHP EA: CPT | Performed by: INTERNAL MEDICINE

## 2020-09-30 PROCEDURE — 71046 X-RAY EXAM CHEST 2 VIEWS: CPT

## 2020-09-30 PROCEDURE — 99233 SBSQ HOSP IP/OBS HIGH 50: CPT | Performed by: HOSPITALIST

## 2020-09-30 PROCEDURE — 93600 BUNDLE OF HIS RECORDING: CPT | Performed by: INTERNAL MEDICINE

## 2020-09-30 PROCEDURE — 25010000003 LIDOCAINE 1 % SOLUTION: Performed by: INTERNAL MEDICINE

## 2020-09-30 PROCEDURE — 99152 MOD SED SAME PHYS/QHP 5/>YRS: CPT | Performed by: INTERNAL MEDICINE

## 2020-09-30 PROCEDURE — 93005 ELECTROCARDIOGRAM TRACING: CPT | Performed by: INTERNAL MEDICINE

## 2020-09-30 PROCEDURE — 80048 BASIC METABOLIC PNL TOTAL CA: CPT | Performed by: INTERNAL MEDICINE

## 2020-09-30 PROCEDURE — 25010000002 MIDAZOLAM PER 1 MG: Performed by: INTERNAL MEDICINE

## 2020-09-30 PROCEDURE — 93010 ELECTROCARDIOGRAM REPORT: CPT | Performed by: INTERNAL MEDICINE

## 2020-09-30 PROCEDURE — 25010000002 HEPARIN (PORCINE) PER 1000 UNITS: Performed by: INTERNAL MEDICINE

## 2020-09-30 PROCEDURE — 33274 TCAT INSJ/RPL PERM LDLS PM: CPT | Performed by: INTERNAL MEDICINE

## 2020-09-30 PROCEDURE — 0 IOPAMIDOL PER 1 ML: Performed by: INTERNAL MEDICINE

## 2020-09-30 PROCEDURE — 99233 SBSQ HOSP IP/OBS HIGH 50: CPT | Performed by: INTERNAL MEDICINE

## 2020-09-30 PROCEDURE — 63710000001 INSULIN LISPRO (HUMAN) PER 5 UNITS: Performed by: INTERNAL MEDICINE

## 2020-09-30 PROCEDURE — 82962 GLUCOSE BLOOD TEST: CPT

## 2020-09-30 PROCEDURE — C1732 CATH, EP, DIAG/ABL, 3D/VECT: HCPCS | Performed by: INTERNAL MEDICINE

## 2020-09-30 PROCEDURE — 25010000002 ONDANSETRON PER 1 MG: Performed by: INTERNAL MEDICINE

## 2020-09-30 PROCEDURE — 25010000002 FENTANYL CITRATE (PF) 100 MCG/2ML SOLUTION: Performed by: INTERNAL MEDICINE

## 2020-09-30 PROCEDURE — 97110 THERAPEUTIC EXERCISES: CPT

## 2020-09-30 PROCEDURE — 83735 ASSAY OF MAGNESIUM: CPT | Performed by: INTERNAL MEDICINE

## 2020-09-30 DEVICE — GEN PM MICRA TRANSCATHETER LDFR: Type: IMPLANTABLE DEVICE | Status: FUNCTIONAL

## 2020-09-30 RX ORDER — MIDAZOLAM HYDROCHLORIDE 1 MG/ML
INJECTION INTRAMUSCULAR; INTRAVENOUS AS NEEDED
Status: DISCONTINUED | OUTPATIENT
Start: 2020-09-30 | End: 2020-09-30 | Stop reason: HOSPADM

## 2020-09-30 RX ORDER — SODIUM CHLORIDE 0.9 % (FLUSH) 0.9 %
3 SYRINGE (ML) INJECTION EVERY 12 HOURS SCHEDULED
Status: CANCELLED | OUTPATIENT
Start: 2020-09-30

## 2020-09-30 RX ORDER — SODIUM CHLORIDE 9 MG/ML
INJECTION, SOLUTION INTRAVENOUS CONTINUOUS PRN
Status: COMPLETED | OUTPATIENT
Start: 2020-09-30 | End: 2020-09-30

## 2020-09-30 RX ORDER — NITROGLYCERIN 0.4 MG/1
0.4 TABLET SUBLINGUAL
Status: CANCELLED | OUTPATIENT
Start: 2020-09-30

## 2020-09-30 RX ORDER — BUPIVACAINE HYDROCHLORIDE 5 MG/ML
INJECTION, SOLUTION EPIDURAL; INTRACAUDAL AS NEEDED
Status: DISCONTINUED | OUTPATIENT
Start: 2020-09-30 | End: 2020-09-30 | Stop reason: HOSPADM

## 2020-09-30 RX ORDER — KETOROLAC TROMETHAMINE 15 MG/ML
15 INJECTION, SOLUTION INTRAMUSCULAR; INTRAVENOUS EVERY 8 HOURS
Status: DISCONTINUED | OUTPATIENT
Start: 2020-09-30 | End: 2020-10-01 | Stop reason: HOSPADM

## 2020-09-30 RX ORDER — SODIUM CHLORIDE 0.9 % (FLUSH) 0.9 %
10 SYRINGE (ML) INJECTION AS NEEDED
Status: CANCELLED | OUTPATIENT
Start: 2020-09-30

## 2020-09-30 RX ORDER — HEPARIN SODIUM 1000 [USP'U]/ML
INJECTION, SOLUTION INTRAVENOUS; SUBCUTANEOUS AS NEEDED
Status: DISCONTINUED | OUTPATIENT
Start: 2020-09-30 | End: 2020-09-30 | Stop reason: HOSPADM

## 2020-09-30 RX ORDER — CLINDAMYCIN PHOSPHATE 600 MG/50ML
600 INJECTION INTRAVENOUS ONCE
Status: COMPLETED | OUTPATIENT
Start: 2020-09-30 | End: 2020-09-30

## 2020-09-30 RX ORDER — ONDANSETRON 2 MG/ML
INJECTION INTRAMUSCULAR; INTRAVENOUS AS NEEDED
Status: DISCONTINUED | OUTPATIENT
Start: 2020-09-30 | End: 2020-09-30 | Stop reason: HOSPADM

## 2020-09-30 RX ORDER — FENTANYL CITRATE 50 UG/ML
INJECTION, SOLUTION INTRAMUSCULAR; INTRAVENOUS AS NEEDED
Status: DISCONTINUED | OUTPATIENT
Start: 2020-09-30 | End: 2020-09-30 | Stop reason: HOSPADM

## 2020-09-30 RX ORDER — CLINDAMYCIN PHOSPHATE 900 MG/50ML
900 INJECTION INTRAVENOUS
Status: CANCELLED | OUTPATIENT
Start: 2020-09-30

## 2020-09-30 RX ORDER — ACETAMINOPHEN 325 MG/1
650 TABLET ORAL EVERY 4 HOURS PRN
Status: CANCELLED | OUTPATIENT
Start: 2020-09-30

## 2020-09-30 RX ORDER — LIDOCAINE HYDROCHLORIDE 10 MG/ML
INJECTION, SOLUTION INFILTRATION; PERINEURAL AS NEEDED
Status: DISCONTINUED | OUTPATIENT
Start: 2020-09-30 | End: 2020-09-30 | Stop reason: HOSPADM

## 2020-09-30 RX ADMIN — DILTIAZEM HYDROCHLORIDE 120 MG: 60 TABLET, FILM COATED ORAL at 01:22

## 2020-09-30 RX ADMIN — IPRATROPIUM BROMIDE AND ALBUTEROL SULFATE 3 ML: 2.5; .5 SOLUTION RESPIRATORY (INHALATION) at 11:23

## 2020-09-30 RX ADMIN — IPRATROPIUM BROMIDE AND ALBUTEROL SULFATE 3 ML: 2.5; .5 SOLUTION RESPIRATORY (INHALATION) at 19:10

## 2020-09-30 RX ADMIN — INSULIN LISPRO 6 UNITS: 100 INJECTION, SOLUTION INTRAVENOUS; SUBCUTANEOUS at 23:07

## 2020-09-30 RX ADMIN — DILTIAZEM HYDROCHLORIDE 120 MG: 60 TABLET, FILM COATED ORAL at 11:41

## 2020-09-30 RX ADMIN — PANTOPRAZOLE SODIUM 40 MG: 40 TABLET, DELAYED RELEASE ORAL at 06:48

## 2020-09-30 RX ADMIN — APIXABAN 5 MG: 5 TABLET, FILM COATED ORAL at 08:55

## 2020-09-30 RX ADMIN — MONTELUKAST SODIUM 10 MG: 10 TABLET, COATED ORAL at 23:08

## 2020-09-30 RX ADMIN — INSULIN DETEMIR 10 UNITS: 100 INJECTION, SOLUTION SUBCUTANEOUS at 08:55

## 2020-09-30 RX ADMIN — ACETAMINOPHEN 650 MG: 325 TABLET, FILM COATED ORAL at 17:42

## 2020-09-30 RX ADMIN — DILTIAZEM HYDROCHLORIDE 120 MG: 60 TABLET, FILM COATED ORAL at 06:48

## 2020-09-30 RX ADMIN — BUDESONIDE AND FORMOTEROL FUMARATE DIHYDRATE 2 PUFF: 160; 4.5 AEROSOL RESPIRATORY (INHALATION) at 07:41

## 2020-09-30 RX ADMIN — CLINDAMYCIN PHOSPHATE 600 MG: 12 INJECTION, SOLUTION INTRAVENOUS at 13:47

## 2020-09-30 RX ADMIN — BUDESONIDE AND FORMOTEROL FUMARATE DIHYDRATE 2 PUFF: 160; 4.5 AEROSOL RESPIRATORY (INHALATION) at 19:17

## 2020-09-30 RX ADMIN — IPRATROPIUM BROMIDE AND ALBUTEROL SULFATE 3 ML: 2.5; .5 SOLUTION RESPIRATORY (INHALATION) at 07:41

## 2020-09-30 RX ADMIN — APIXABAN 5 MG: 5 TABLET, FILM COATED ORAL at 23:09

## 2020-10-01 ENCOUNTER — READMISSION MANAGEMENT (OUTPATIENT)
Dept: CALL CENTER | Facility: HOSPITAL | Age: 60
End: 2020-10-01

## 2020-10-01 ENCOUNTER — APPOINTMENT (OUTPATIENT)
Dept: GENERAL RADIOLOGY | Facility: HOSPITAL | Age: 60
End: 2020-10-01

## 2020-10-01 VITALS
OXYGEN SATURATION: 94 % | TEMPERATURE: 98 F | HEART RATE: 81 BPM | RESPIRATION RATE: 18 BRPM | SYSTOLIC BLOOD PRESSURE: 111 MMHG | WEIGHT: 145 LBS | BODY MASS INDEX: 24.16 KG/M2 | DIASTOLIC BLOOD PRESSURE: 69 MMHG | HEIGHT: 65 IN

## 2020-10-01 LAB
ANION GAP SERPL CALCULATED.3IONS-SCNC: 8 MMOL/L (ref 5–15)
BUN SERPL-MCNC: 16 MG/DL (ref 8–23)
BUN/CREAT SERPL: 27.6 (ref 7–25)
CALCIUM SPEC-SCNC: 8.7 MG/DL (ref 8.6–10.5)
CHLORIDE SERPL-SCNC: 101 MMOL/L (ref 98–107)
CO2 SERPL-SCNC: 28 MMOL/L (ref 22–29)
CREAT SERPL-MCNC: 0.58 MG/DL (ref 0.57–1)
GFR SERPL CREATININE-BSD FRML MDRD: 105 ML/MIN/1.73
GLUCOSE BLDC GLUCOMTR-MCNC: 164 MG/DL (ref 70–130)
GLUCOSE BLDC GLUCOMTR-MCNC: 230 MG/DL (ref 70–130)
GLUCOSE SERPL-MCNC: 90 MG/DL (ref 65–99)
MAGNESIUM SERPL-MCNC: 2.4 MG/DL (ref 1.6–2.4)
POTASSIUM SERPL-SCNC: 4.3 MMOL/L (ref 3.5–5.2)
SODIUM SERPL-SCNC: 137 MMOL/L (ref 136–145)

## 2020-10-01 PROCEDURE — 97535 SELF CARE MNGMENT TRAINING: CPT

## 2020-10-01 PROCEDURE — 25010000002 KETOROLAC TROMETHAMINE PER 15 MG: Performed by: INTERNAL MEDICINE

## 2020-10-01 PROCEDURE — 99024 POSTOP FOLLOW-UP VISIT: CPT | Performed by: NURSE PRACTITIONER

## 2020-10-01 PROCEDURE — 71045 X-RAY EXAM CHEST 1 VIEW: CPT

## 2020-10-01 PROCEDURE — 94799 UNLISTED PULMONARY SVC/PX: CPT

## 2020-10-01 PROCEDURE — 83735 ASSAY OF MAGNESIUM: CPT | Performed by: INTERNAL MEDICINE

## 2020-10-01 PROCEDURE — 80048 BASIC METABOLIC PNL TOTAL CA: CPT | Performed by: INTERNAL MEDICINE

## 2020-10-01 PROCEDURE — 99238 HOSP IP/OBS DSCHRG MGMT 30/<: CPT | Performed by: INTERNAL MEDICINE

## 2020-10-01 PROCEDURE — 63710000001 INSULIN LISPRO (HUMAN) PER 5 UNITS: Performed by: INTERNAL MEDICINE

## 2020-10-01 PROCEDURE — 82962 GLUCOSE BLOOD TEST: CPT

## 2020-10-01 RX ADMIN — APIXABAN 5 MG: 5 TABLET, FILM COATED ORAL at 08:29

## 2020-10-01 RX ADMIN — IPRATROPIUM BROMIDE AND ALBUTEROL SULFATE 3 ML: 2.5; .5 SOLUTION RESPIRATORY (INHALATION) at 16:08

## 2020-10-01 RX ADMIN — INSULIN LISPRO 4 UNITS: 100 INJECTION, SOLUTION INTRAVENOUS; SUBCUTANEOUS at 13:34

## 2020-10-01 RX ADMIN — PANTOPRAZOLE SODIUM 40 MG: 40 TABLET, DELAYED RELEASE ORAL at 06:18

## 2020-10-01 RX ADMIN — IPRATROPIUM BROMIDE AND ALBUTEROL SULFATE 3 ML: 2.5; .5 SOLUTION RESPIRATORY (INHALATION) at 07:31

## 2020-10-01 RX ADMIN — IPRATROPIUM BROMIDE AND ALBUTEROL SULFATE 3 ML: 2.5; .5 SOLUTION RESPIRATORY (INHALATION) at 13:22

## 2020-10-01 RX ADMIN — INSULIN LISPRO 2 UNITS: 100 INJECTION, SOLUTION INTRAVENOUS; SUBCUTANEOUS at 08:29

## 2020-10-01 RX ADMIN — BUDESONIDE AND FORMOTEROL FUMARATE DIHYDRATE 2 PUFF: 160; 4.5 AEROSOL RESPIRATORY (INHALATION) at 07:31

## 2020-10-01 RX ADMIN — KETOROLAC TROMETHAMINE 15 MG: 15 INJECTION, SOLUTION INTRAMUSCULAR; INTRAVENOUS at 06:19

## 2020-10-01 NOTE — PROGRESS NOTES
NN spoke with pt at BS.  Pt alert and able to answer questions appropriately.  Pt O2 sat   94 % on 3 L currently, home O2 use  2-3L.  Deep breathing exercises encouraged.  COPD action plan reviewed.  No new concerns or questions voiced at this time.  NN will continue to follow as needed.            Patient has a previously scheduled appointment with [facility] with HERNAN Phillips on Oct 20, 2020.      Per current GOLD Standards, please consider: LAMA/LABA/ICS in place, Outpatient PFT, Pulmonary rehab as appropriate

## 2020-10-01 NOTE — PROGRESS NOTES
"      Cardiac Electrophysiology Inpatient Follow Up Note         [facility]    Progress Note      CC: Persistent atrial fibrillation    Subjective      Ms. Nolvia Huff is a 60-year-old white female seen in EP follow-up today status post leadless permanent pacemaker implant yesterday with subsequent AV node ablation.  Upon evaluation patient is resting comfortably in bed without complaints.  Patient's right groin suture was removed without hematoma or drainage noted.  Patient does have mild amount of ecchymosis on assessment.  Patient has been up walking in room and voiding without difficulty.  Patient states she is ready for discharge home today if possible.      REVIEW OF SYSTEMS  ROS no change from admission H&P except for: above    Objective   VITAL SIGNS  /69 (BP Location: Left arm, Patient Position: Sitting)   Pulse 80   Temp 98 °F (36.7 °C) (Oral)   Resp 18   Ht 165.1 cm (65\")   Wt 65.8 kg (145 lb)   LMP  (LMP Unknown)   SpO2 94%   BMI 24.13 kg/m²     Pulse Ox: SpO2  Av.3 %  Min: 90 %  Max: 96 %  Supplemental O2: O2 Flow Rate (L/min): 3 L/min     Admit Weight  Weight: 68.5 kg (151 lb)  Last 3 Weights    Body mass index is 24.13 kg/m².    INTAKE/OUTPUT  I/O last 3 completed shifts:  In: 1240 [P.O.:940; I.V.:300]  Out: 1700 [Urine:1700]    Intake/Output Summary (Last 24 hours) at 10/1/2020 1018  Last data filed at 10/1/2020 0900  Gross per 24 hour   Intake 1370 ml   Output 700 ml   Net 670 ml       PHYSICAL EXAM  General appearance: awake, alert, oriented, moves all extremities  Lungs: no rhonchi, no wheezes, no rales  Heart: S1-S2, RRR  Abdomen: positive bowel sounds, no bruits, no masses  Extremities: warm and dry, no cyanosis, no clubbing    LABS  Results from last 7 days   Lab Units 20  0452 20  0636 20  0400   WBC 10*3/mm3 10.76 11.74* 11.51*   HEMOGLOBIN g/dL 14.0 14.5 12.0   HEMATOCRIT % 44.8 46.4 38.9   MCV fL 96.1 96.9 96.8   "   PLATELETS 10*3/mm3 271 278 210         Results from last 7 days   Lab Units 10/01/20  0504 09/30/20  0428 09/29/20  0452   POTASSIUM mmol/L 4.3 4.4 4.6   CHLORIDE mmol/L 101 103 101   CO2 mmol/L 28.0 31.0* 30.0*   BUN mg/dL 16 16 16   CREATININE mg/dL 0.58 0.69 0.60   GLUCOSE mg/dL 90 98 116*   CALCIUM mg/dL 8.7 8.8 9.0   MAGNESIUM mg/dL 2.4 2.1 2.2             Results from last 7 days   Lab Units 10/01/20  0504 09/30/20  0428 09/29/20  0452   MAGNESIUM mg/dL 2.4 2.1 2.2       CURRENT MEDICATIONS  apixaban, 5 mg, Oral, Q12H  budesonide-formoterol, 2 puff, Inhalation, BID - RT  insulin lispro, 0-9 Units, Subcutaneous, 4x Daily With Meals & Nightly  ipratropium-albuterol, 3 mL, Nebulization, 4x Daily - RT  ketorolac, 15 mg, Intravenous, Q8H  montelukast, 10 mg, Oral, Nightly  pantoprazole, 40 mg, Oral, Q AM      Device interrogation: [employer]tronic Micra VR at VVIR  ppm, acceptable device threshold and impedance, battery voltage at 3.16 V with greater than 8 years remaining.    TELEMETRY: Paced 75 bpm    Assessment & Plan     Ms. Nolvia Huff is a 60-year-old white female seen in EP follow-up today status post leadless permanent pacemaker implant yesterday with subsequent AV node ablation in treatment for her highly symptomatic and drug refractory persistent atrial fibrillation.  Patient is stable from a EP standpoint and ready for discharge home today.  Device interrogation performed today with noted normal function.  We will have patient follow-up with Dr. Solomon in 3 months with Medtronic device check and rate adjustment status post AV node ablation.  Patient is instructed on all activity restrictions and wound care instructions.  Education reinforcement on continued uninterrupted anticoagulation therapy for her persistent atrial fibrillation were outlined and reviewed.  Patient educated that she will not need aggressive rhythm/rate control status post AV node ablation.  Patient verbalized complete understanding  of the above instruction and education is ready for discharge home today from an EP standpoint.      Electronically signed by HERNAN Case, 10/01/20, 10:18 AM EDT.

## 2020-10-01 NOTE — THERAPY DISCHARGE NOTE
Patient Name: Nolvia Huff  : 1960    MRN: [id number]                              Today's Date: 10/1/2020       Admit Date: 2020    Visit Dx:     ICD-10-CM ICD-9-CM   1. Atrial fibrillation with rapid ventricular response (CMS/[facility])  I48.91 427.31   2. Acute respiratory failure with hypoxia and hypercarbia (CMS/[facility])  J96.01 518.81    J96.02    3. Sepsis with acute hypercapnic respiratory failure without septic shock, due to unspecified organism (CMS/[facility])  A41.9 038.9    R65.20 995.92    J96.02 518.81   4. Pulmonary emphysema, unspecified emphysema type (CMS/[facility])  J43.9 492.8   5. Atrial fibrillation, persistent (CMS/[facility])  I48.19 427.31     Patient Active Problem List   Diagnosis   • Tobacco abuse   • Anxiety disorder   • Dyslipidemia   • Emphysema/COPD (CMS/[facility])   • GERD    • Osteoporosis   • Personal history of tobacco use, presenting hazards to health   • COPD with acute exacerbation   • Nocturnal hypoxia on 2L NC    • Pulmonary emphysema (CMS/[facility])   • A/C Respiratory Failure Type 2   • AF w/RVR    • Former smoker   • Atrial fibrillation with rapid ventricular response (CMS/[facility])   • Acute systolic CHF (congestive heart failure) (CMS/[facility])     Past Medical History:   Diagnosis Date   • Acute otitis media     H/o   • H/O chest x-ray 10/04/2011    Cardiac silhouute normal limits. The pulmonary vasculature appears normal, The diaphragms are flattened bilaterally which is sug of COPD. Lungs are grossly clear bilaterally with no prominent nodules or masses seen. No change when compared to previous exam from 11/10/09   • H/O chest x-ray 2011    Chronic change. No active disease   • H/O chest x-ray 11/10/2009    Moderate degenerative changes of thoracic spine. Cardiac silhoutte appears normal. There is slight hyperexpansion. There were a few scattered calcified granulomas. No acute appearing infiltrates noted. no real change compared to 10/20/08   • History of PFTs 2015    Severe OAD w/ dec in  FEV from prior. no resp to BDRX. MVV reduced. FVC reduced; sugg restricitve pattern   • History of PFTs 10/21/2013    Mod severe OAD, FVC normal, no restriction, MVV reduced   • History of PFTs 06/19/2012    Severe OAD, FVC reduced, MVV dec   • Normal stillborn     Pt had stillborn infant     Past Surgical History:   Procedure Laterality Date   • BREAST CYST ASPIRATION Right    • HYSTERECTOMY     • TONSILLECTOMY       General Information     Row Name 10/01/20 1333          OT Time and Intention    Document Type  discharge treatment  -KF     Mode of Treatment  occupational therapy  -KF     Row Name 10/01/20 1333          General Information    Existing Precautions/Restrictions  cardiac;oxygen therapy device and L/min  -     Row Name 10/01/20 1333          Cognition    Orientation Status (Cognition)  oriented x 4  -     Row Name 10/01/20 1333          Safety Issues, Functional Mobility    Safety Issues Affecting Function (Mobility)  safety precaution awareness  -     Impairments Affecting Function (Mobility)  endurance/activity tolerance  -KF       User Key  (r) = Recorded By, (t) = Taken By, (c) = Cosigned By    Initials Name Provider Type    KF Tia Mendes, OT Occupational Therapist        Mobility/ADL's     Row Name 10/01/20 1338          Bed Mobility    Bed Mobility  supine-sit;scooting/bridging  -KF     Scooting/Bridging [place] (Bed Mobility)  independent  -KF     Supine-Sit [place] (Bed Mobility)  modified independence  -KF     Assistive Device (Bed Mobility)  head of bed elevated  -     Row Name 10/01/20 133          Transfers    Transfers  sit-stand transfer  -     Comment (Transfers)  DEMO good safety no LOB throughout with balance challenges with ADL tasks  -KF     Sit-Stand [place] (Transfers)  independent  -     Row Name 10/01/20 1334          Sit-Stand Transfer    Assistive Device (Sit-Stand Transfers)  -- no AD  -KF     Row Name 10/01/20 1334          Functional  Mobility    Functional Mobility- Ind. Level  supervision required  -KF     Functional Mobility-Distance (Feet)  10  -KF     Functional Mobility- Safety Issues  supplemental O2  -KF     Functional Mobility- Comment  good completion no LOB no AD required  -KF     Row Name 10/01/20 1334          Activities of Daily Living    BADL Assessment/Intervention  lower body dressing;upper body dressing;grooming;bathing  -KF     Row Name 10/01/20 1334          Lower Body Dressing Assessment/Training    [place] Level (Lower Body Dressing)  don;doff;socks;pants/bottoms;independent  -KF     Position (Lower Body Dressing)  edge of bed sitting;unsupported standing  -KF     Comment (Lower Body Dressing)  demo ability to reach to feet well for all LBD  -KF     Row Name 10/01/20 1334          Upper Body Dressing Assessment/Training    [place] Level (Upper Body Dressing)  don;doff;front opening garment;independent  -KF     Position (Upper Body Dressing)  unsupported standing  -KF     Comment (Upper Body Dressing)  sink side during grooming/bathing  -KF     Row Name 10/01/20 1334          Grooming Assessment/Training    [place] Level (Grooming)  hair care, combing/brushing;oral care regimen;wash face, hands;independent  -KF     Position (Grooming)  sink side;unsupported standing  -KF     Row Name 10/01/20 1334          Bathing Assessment/Intervention    [place] Level (Bathing)  bathing skills;upper body;upper extremities;chest/trunk;perineal area  -KF     Position (Bathing)  unsupported standing  -KF     Comment (Bathing)  good completion, good safety awareness, good compensatory strategies no LOB  -KF       User Key  (r) = Recorded By, (t) = Taken By, (c) = Cosigned By    Initials Name Provider Type    Tia Govea OT Occupational Therapist        Obj/Interventions     Row Name 10/01/20 1337          Balance    Balance Assessment  sitting static balance;sitting dynamic balance;standing static  balance;standing dynamic balance  -KF     Static Sitting Balance  WNL  -KF     Dynamic Sitting Balance  WNL  -KF     Static Standing Balance  WNL  -KF     Dynamic Standing Balance  WFL  -KF     Balance Interventions  sitting;standing;moderate challenge;occupation based/functional task  -KF       User Key  (r) = Recorded By, (t) = Taken By, (c) = Cosigned By    Initials Name Provider Type    Tia Govea OT Occupational Therapist        Goals/Plan     Row Name 10/01/20 1341          Transfer Goal 1 (OT)    Progress/Outcome (Transfer Goal 1, OT)  goal met demo appropriate distance within room today for toilet tf  -KF     Row Name 10/01/20 1341          Dressing Goal 1 (OT)    Progress/Outcome (Dressing Goal 1, OT)  goal met  -KF     Row Name 10/01/20 1341          Grooming Goal 1 (OT)    Progress/Outcome (Grooming Goal 1, OT)  goal met  -KF       User Key  (r) = Recorded By, (t) = Taken By, (c) = Cosigned By    Initials Name Provider Type    Tia Govea OT Occupational Therapist        Clinical Impression     Row Name 10/01/20 7497          Pain Assessment    Additional Documentation  Pain Scale: Numbers Pre/Post-Treatment (Group)  -     Row Name 10/01/20 6206          Pain Scale: Numbers Pre/Post-Treatment    Pretreatment Pain Rating  0/10 - no pain  -     Posttreatment Pain Rating  0/10 - no pain  -     Pain Intervention(s)  Ambulation/increased activity;Repositioned  -     Row Name 10/01/20 6525          Plan of Care Review    Plan of Care Reviewed With  patient  -     Progress  improving  -     Outcome Summary  Pt completed bed mob mod I, I STS and functional mob within room no AD required, tolerated over 20 min standing sink side during grooming and UB/pericare bathing without support in standing, demo I with all LBD socks and pants, met all OT goals no further recom IPOT recom d/c home with assist  -     Row Name 10/01/20 9603          Therapy Assessment/Plan (OT)    Therapy  Frequency (OT)  daily  -KF     Row Name 10/01/20 1338          Therapy Plan Review/Discharge Plan (OT)    Equipment Needs Upon Discharge (OT)  -- none  -KF     Anticipated Discharge Disposition (OT)  home with assist  -KF     Row Name 10/01/20 1338          Vital Signs    Pre Systolic BP Rehab  -- RN cleared VSS  -KF     Pre SpO2 (%)  98  -KF     O2 Delivery Pre Treatment  supplemental O2  -KF     Pre Patient Position  Supine  -KF     Intra Patient Position  Standing  -KF     Post Patient Position  Sitting  -KF     Row Name 10/01/20 1338          Positioning and Restraints    Pre-Treatment Position  in bed  -KF     Post Treatment Position  bed  -KF     In Bed  sitting EOB;with other staff;call light within reach;encouraged to call for assist with respiratory therapist  -KF       User Key  (r) = Recorded By, (t) = Taken By, (c) = Cosigned By    Initials Name Provider Type    Tia Govea OT Occupational Therapist        Outcome Measures     Row Name 10/01/20 1341          How much help from another is currently needed...    Putting on and taking off regular lower body clothing?  4  -KF     Bathing (including washing, rinsing, and drying)  4  -KF     Toileting (which includes using toilet bed pan or urinal)  4  -KF     Putting on and taking off regular upper body clothing  4  -KF     Taking care of personal grooming (such as brushing teeth)  4  -KF     Eating meals  4  -KF     AM-PAC 6 Clicks Score (OT)  24  -KF     Row Name 10/01/20 1341          Functional Assessment    Outcome Measure Options  AM-PAC 6 Clicks Daily Activity (OT)  -KF       User Key  (r) = Recorded By, (t) = Taken By, (c) = Cosigned By    Initials Name Provider Type    Tia Govea OT Occupational Therapist        Occupational Therapy Education                 Title: PT OT SLP Therapies (In Progress)     Topic: Occupational Therapy (Done)     Point: ADL training (Done)     Description:   Instruct learner(s) on proper safety  adaptation and remediation techniques during self care or transfers.   Instruct in proper use of assistive devices.              Learning Progress Summary           Patient Acceptance, E,TB,D, DU,VU by  at 10/1/2020 1342    Acceptance, E,TB, VU,DU by  at 9/25/2020 1916    Acceptance, E, NR by  at 9/25/2020 1534                   Point: Home exercise program (Done)     Description:   Instruct learner(s) on appropriate technique for monitoring, assisting and/or progressing therapeutic exercises/activities.              Learning Progress Summary           Patient Acceptance, E,TB,D, DU,VU by KF at 10/1/2020 1342    Acceptance, E,TB, VU,DU by  at 9/25/2020 1916                   Point: Precautions (Done)     Description:   Instruct learner(s) on prescribed precautions during self-care and functional transfers.              Learning Progress Summary           Patient Acceptance, E,TB,D, DU,VU by KF at 10/1/2020 1342    Acceptance, E,TB, VU,DU by  at 9/25/2020 1916    Acceptance, E, NR by  at 9/25/2020 1534                   Point: Body mechanics (Done)     Description:   Instruct learner(s) on proper positioning and spine alignment during self-care, functional mobility activities and/or exercises.              Learning Progress Summary           Patient Acceptance, E,TB,D, DU,VU by  at 10/1/2020 1342    Acceptance, E,TB, VU,DU by  at 9/25/2020 1916    Acceptance, E, NR by  at 9/25/2020 1534                               User Key     Initials Effective Dates Name Provider Type Discipline     06/16/16 -  Pili Keith RN Registered Nurse Nurse     04/03/18 -  Mireya Gunderson OT Occupational Therapist OT     04/03/18 -  Tia Mendes OT Occupational Therapist OT              OT Recommendation and Plan  Retired Outcome Summary/Treatment Plan (OT)  Anticipated Discharge Disposition (OT): home with assist  Therapy Frequency (OT): daily  Plan of Care Review  Plan of Care Reviewed With:  patient  Progress: improving  Outcome Summary: Pt completed bed mob mod I, I STS and functional mob within room no AD required, tolerated over 20 min standing sink side during grooming and UB/pericare bathing without support in standing, demo I with all LBD socks and pants, met all OT goals no further recom IPOT recom d/c home with assist  Plan of Care Reviewed With: patient  Outcome Summary: Pt completed bed mob mod I, I STS and functional mob within room no AD required, tolerated over 20 min standing sink side during grooming and UB/pericare bathing without support in standing, demo I with all LBD socks and pants, met all OT goals no further recom IPOT recom d/c home with assist     Time Calculation:   Time Calculation- OT     Row Name 10/01/20 1300             Time Calculation- OT    OT Start Time  1300  -KF      OT Stop Time  1323  -KF      OT Time Calculation (min)  23 min  -KF      OT Received On  10/01/20  -KF         Timed Charges    48114 - OT Self Care/Mgmt Minutes  23  -KF        User Key  (r) = Recorded By, (t) = Taken By, (c) = Cosigned By    Initials Name Provider Type    Tia Govea OT Occupational Therapist        Therapy Charges for Today     Code Description Service Date Service Provider Modifiers Qty    [id number] HC OT SELF CARE/MGMT/TRAIN EA 15 MIN 10/1/2020 Tia Mendes OT GO 2               Tia Mendes OT  10/1/2020

## 2020-10-01 NOTE — PLAN OF CARE
Problem: Adult Inpatient Plan of Care  Goal: Plan of Care Review  Recent Flowsheet Documentation  Taken 10/1/2020 1338 by Tia Mendes, OT  Progress: improving  Plan of Care Reviewed With: patient  Outcome Summary: Pt completed bed mob mod I, I STS and functional mob within room no AD required, tolerated over 20 min standing sink side during grooming and UB/pericare bathing without support in standing, demo I with all LBD socks and pants, met all OT goals no further recom IPOT recom d/c home with assist

## 2020-10-01 NOTE — PROGRESS NOTES
"[facility] at [facility] Progress Note     LOS: 10 days   Patient Care Team:  Alfonso Denney MD as PCP - General  PCP:  Alfonso Denney MD    Chief Complaint: Follow-up atrial fibrillation    Subjective:     Patient reports feeling much better after undergoing AVN ablation and leadless PPM placement. She denies any complaints this AM. Denies pain or tenderness at right femoral access site. Hopeful to be discharged home soon.       Review of Systems:   Negative for exertional chest pain, dyspnea with exertion, orthopnea, PND, lower extremity edema, palpitations, lightheadedness, syncope.    Objective:    Vital Sign Min/Max for last 24 hours  Temp  Min: 98 °F (36.7 °C)  Max: 99 °F (37.2 °C)   BP  Min: 89/58  Max: 111/69   Pulse  Min: 79  Max: 119   Resp  Min: 9  Max: 19   SpO2  Min: 90 %  Max: 96 %   No data recorded   Weight  Min: 65.8 kg (145 lb)  Max: 65.8 kg (145 lb)     Flowsheet Rows      First Filed Value   Admission Height  164.3 cm (64.69\") Documented at 09/21/2020 0514   Admission Weight  68.5 kg (151 lb) Documented at 09/21/2020 0514          Telemetry: Paced      Intake/Output Summary (Last 24 hours) at 10/1/2020 0856  Last data filed at 10/1/2020 0808  Gross per 24 hour   Intake 890 ml   Output 700 ml   Net 190 ml     Intake & Output (last 3 days)       09/28 0701 - 09/29 0700 09/29 0701 - 09/30 0700 09/30 0701 - 10/01 0700 10/01 0701 - 10/02 0700    P.O.  590 590     I.V. (mL/kg)   300 (4.6)     Total Intake(mL/kg)  590 (9) 890 (13.5)     Urine (mL/kg/hr) 900 (0.6) 1300 (0.8) 400 (0.3) 300 (2.4)    Stool 0       Total Output 900 1300 400 300    Net -900 -710 +490 -300            Urine Unmeasured Occurrence 2 x       Stool Unmeasured Occurrence 2 x              Physical Exam:  CONSTITUTIONAL: No acute distress  RESPIRATORY: Normal effort. Clear to auscultation bilaterally without wheezing or rales  CARDIOVASCULAR: Regular rate and rhythm with normal S1 and S2. Without " murmur, gallop or rub.  PERIPHERAL VASCULAR: Normal radial pulse. There is no lower extremity edema bilaterally. Right femoral access site C/D/I.  PSYCH: Normal affect and mood       LABS/DIAGNOSTIC DATA:  Results from last 7 days   Lab Units 09/29/20  0452 09/28/20  0636 09/25/20  0400   WBC 10*3/mm3 10.76 11.74* 11.51*   HEMOGLOBIN g/dL 14.0 14.5 12.0   HEMATOCRIT % 44.8 46.4 38.9   PLATELETS 10*3/mm3 271 278 210     Lab Results   Lab Value Date/Time    TROPONINT <0.010 09/21/2020 0537         Results from last 7 days   Lab Units 10/01/20  0504 09/30/20  0428 09/29/20  0452   SODIUM mmol/L 137 140 139   POTASSIUM mmol/L 4.3 4.4 4.6   CHLORIDE mmol/L 101 103 101   CO2 mmol/L 28.0 31.0* 30.0*   BUN mg/dL 16 16 16   CREATININE mg/dL 0.58 0.69 0.60   CALCIUM mg/dL 8.7 8.8 9.0   GLUCOSE mg/dL 90 98 116*                       Medication Review:   apixaban, 5 mg, Oral, Q12H  budesonide-formoterol, 2 puff, Inhalation, BID - RT  insulin lispro, 0-9 Units, Subcutaneous, 4x Daily With Meals & Nightly  ipratropium-albuterol, 3 mL, Nebulization, 4x Daily - RT  ketorolac, 15 mg, Intravenous, Q8H  montelukast, 10 mg, Oral, Nightly  pantoprazole, 40 mg, Oral, Q AM               A/C Respiratory Failure Type 2    GERD     COPD with acute exacerbation    AF w/RVR     Atrial fibrillation with rapid ventricular response (CMS/HCC)    Acute systolic CHF (congestive heart failure) (CMS/HCC)      Assessment/Plan:  1.  Atrial fibrillation  - CHADSVASC = 2  (female, cadiomyopathy),    -Episode nonsustained torsades with tikosyn  -Persistently elevated A. fib rates at rest despite high-dose diltiazem.  -S/P AVN ablation and leadless PPM implant with Dr. Solomon 9/30/2020  -Continue Eliquis 5 mg twice daily      2.  Biventricular systolic heart failure, acute on chronic  - Echo 9/21/22: EF 26 to 30%, left atrial cavity moderately dilated, small PFO with right to left shunting, borderline RV dilation, moderately reduced RV systolic function,  moderate TR, RVSP 45 to 55 mmHg.  -EF looked improved to 45% on IRMA  -Current reduction in EF may be tachycardia mediated  -Holding ACE inhibitor/Beta blocker because of hypotension     3.  Hypoxic/hypercarbic respiratory failure  COPD   -Much improved compared with admission established with pulmonary as an outpatient  - CT PE protocol negative for pulmonary embolus      4.  Pulmonary hypertension  -Secondary to lung disease     5.  PFO with right-to-left shunting  -Secondary to lung disease and pulmonary hypertension, may be contributing to her hypoxia        Electronically signed by HERNAN Henry, 10/01/20, 10:58 AM EDT.

## 2020-10-01 NOTE — PLAN OF CARE
Goal Outcome Evaluation:  Plan of Care Reviewed With: patient  Progress: improving   Pacemaker placed one day ago and interrogated. Eager to go home.

## 2020-10-01 NOTE — PLAN OF CARE
Goal Outcome Evaluation:  Plan of Care Reviewed With: patient  Progress: no change  Outcome Summary: Patient slept well tonight. patient had pacemaker placed yesterday with an RFA performed by Dr Cochran. Patient has tolerated well. Vss, paced, 2lnc. may d/c home today, will continue to monitor.

## 2020-10-02 ENCOUNTER — TRANSITIONAL CARE MANAGEMENT TELEPHONE ENCOUNTER (OUTPATIENT)
Dept: CALL CENTER | Facility: HOSPITAL | Age: 60
End: 2020-10-02

## 2020-10-02 NOTE — OUTREACH NOTE
Prep Survey      Responses   [facility] patient discharged from?  [place]   Is LACE score < 7 ?  No   Eligibility  [facility]   Date of Admission  09/21/20   Date of Discharge  10/01/20   Discharge Disposition  Home or Self Care   Discharge diagnosis  MICRA PACEMAKER IMPLANTATION + AVN   Does the patient have one of the following disease processes/diagnoses(primary or secondary)?  General Surgery   Does the patient have Home health ordered?  No   Is there a DME ordered?  No   Comments regarding appointments  see AVS   Medication alerts for this patient  eliquis   Prep survey completed?  Yes          Divina Root RN

## 2020-10-02 NOTE — PAYOR COMM NOTE
"Darcy Gregg RN  Utilization Review  P: [phone]  F: [phone]    Ref # [id number]  DC date = 10/1/2020    Nolvia De La Torre (60 y.o. Female)     Date of Birth Social Security Number Address Home Phone MRN    1960  [id number] [facility] [address] [phone] [id number]    [employer] Marital Status          None        Admission Date Admission Type Admitting Provider Attending Provider Department, Room/Bed    9/21/20 Emergency Armando Matias MD  [facility] 4G, S451/1    Discharge Date Discharge Disposition Discharge Destination        10/1/2020 Home or Self Care              Attending Provider: (none)   Allergies: Penicillins, Tikosyn [Dofetilide]    Isolation: None   Infection: None   Code Status: Prior    Ht: 165.1 cm (65\")   Wt: 65.8 kg (145 lb)    Admission Cmt: None   Principal Problem: A/C Respiratory Failure Type 2 [J96.21,J96.22]                 Active Insurance as of 9/21/2020     Primary Coverage     Payor Plan Insurance Group Employer/Plan Group    [employer] [place] [facility] [employer] [place]      Payor Plan Address Payor Plan Phone Number Payor Plan Fax Number Effective Dates    [address] [address]   1/1/2014 - None Entered    [place] [address]       Subscriber Name Subscriber Birth Date Member ID       NOLVIA DE LA TORRE 1960 [id number]                 Emergency Contacts      (Rel.) Home Phone Work Phone Mobile Phone    DariaJose D herrmann (Relative) [phone] -- [phone]    Mak De La Torre -- -- [phone]    Daria, Cande (Other) -- -- [phone]               Physician Progress Notes (most recent note)      Braydon Arroyo APRN at 10/01/20 1018                Cardiac Electrophysiology Inpatient Follow Up Note         [facility]    Progress Note      CC: Persistent atrial fibrillation    Subjective      Ms. Nolvia De La Torre is a 60-year-old white female seen in EP follow-up " "today status post leadless permanent pacemaker implant yesterday with subsequent AV node ablation.  Upon evaluation patient is resting comfortably in bed without complaints.  Patient's right groin suture was removed without hematoma or drainage noted.  Patient does have mild amount of ecchymosis on assessment.  Patient has been up walking in room and voiding without difficulty.  Patient states she is ready for discharge home today if possible.      REVIEW OF SYSTEMS  ROS no change from admission H&P except for: above    Objective   VITAL SIGNS  /69 (BP Location: Left arm, Patient Position: Sitting)   Pulse 80   Temp 98 °F (36.7 °C) (Oral)   Resp 18   Ht 165.1 cm (65\")   Wt 65.8 kg (145 lb)   LMP  (LMP Unknown)   SpO2 94%   BMI 24.13 kg/m²     Pulse Ox: SpO2  Av.3 %  Min: 90 %  Max: 96 %  Supplemental O2: O2 Flow Rate (L/min): 3 L/min     Admit Weight  Weight: 68.5 kg (151 lb)  Last 3 Weights    Body mass index is 24.13 kg/m².    INTAKE/OUTPUT  I/O last 3 completed shifts:  In: 1240 [P.O.:940; I.V.:300]  Out: 1700 [Urine:1700]    Intake/Output Summary (Last 24 hours) at 10/1/2020 1018  Last data filed at 10/1/2020 0900  Gross per 24 hour   Intake 1370 ml   Output 700 ml   Net 670 ml       PHYSICAL EXAM  General appearance: awake, alert, oriented, moves all extremities  Lungs: no rhonchi, no wheezes, no rales  Heart: S1-S2, RRR  Abdomen: positive bowel sounds, no bruits, no masses  Extremities: warm and dry, no cyanosis, no clubbing    LABS  Results from last 7 days   Lab Units 20  0452 20  0636 20  0400   WBC 10*3/mm3 10.76 11.74* 11.51*   HEMOGLOBIN g/dL 14.0 14.5 12.0   HEMATOCRIT % 44.8 46.4 38.9   MCV fL 96.1 96.9 96.8   PLATELETS 10*3/mm3 271 278 210         Results from last 7 days   Lab Units 10/01/20  0504 20  0428 20  0452   POTASSIUM mmol/L 4.3 4.4 4.6   CHLORIDE mmol/L 101 103 101   CO2 mmol/L 28.0 31.0* 30.0*   BUN mg/dL 16 16 16   CREATININE mg/dL 0.58 " 0.69 0.60   GLUCOSE mg/dL 90 98 116*   CALCIUM mg/dL 8.7 8.8 9.0   MAGNESIUM mg/dL 2.4 2.1 2.2             Results from last 7 days   Lab Units 10/01/20  0504 09/30/20  0428 09/29/20  0452   MAGNESIUM mg/dL 2.4 2.1 2.2       CURRENT MEDICATIONS  apixaban, 5 mg, Oral, Q12H  budesonide-formoterol, 2 puff, Inhalation, BID - RT  insulin lispro, 0-9 Units, Subcutaneous, 4x Daily With Meals & Nightly  ipratropium-albuterol, 3 mL, Nebulization, 4x Daily - RT  ketorolac, 15 mg, Intravenous, Q8H  montelukast, 10 mg, Oral, Nightly  pantoprazole, 40 mg, Oral, Q AM      Device interrogation: Medtronic Micra VR at VVIR  ppm, acceptable device threshold and impedance, battery voltage at 3.16 V with greater than 8 years remaining.    TELEMETRY: Paced 75 bpm    Assessment & Plan     Ms. Nolvia Huff is a 60-year-old white female seen in EP follow-up today status post leadless permanent pacemaker implant yesterday with subsequent AV node ablation in treatment for her highly symptomatic and drug refractory persistent atrial fibrillation.  Patient is stable from a EP standpoint and ready for discharge home today.  Device interrogation performed today with noted normal function.  We will have patient follow-up with Dr. Solomon in 3 months with Medtronic device check and rate adjustment status post AV node ablation.  Patient is instructed on all activity restrictions and wound care instructions.  Education reinforcement on continued uninterrupted anticoagulation therapy for her persistent atrial fibrillation were outlined and reviewed.  Patient educated that she will not need aggressive rhythm/rate control status post AV node ablation.  Patient verbalized complete understanding of the above instruction and education is ready for discharge home today from an EP standpoint.      Electronically signed by HERNAN Case, 10/01/20, 10:18 AM EDT.      Electronically signed by Braydon Arroyo APRN at 10/01/20 9152

## 2020-10-07 ENCOUNTER — OFFICE VISIT (OUTPATIENT)
Dept: CARDIOLOGY | Facility: HOSPITAL | Age: 60
End: 2020-10-07

## 2020-10-07 VITALS
HEART RATE: 85 BPM | BODY MASS INDEX: 24.51 KG/M2 | RESPIRATION RATE: 18 BRPM | DIASTOLIC BLOOD PRESSURE: 63 MMHG | OXYGEN SATURATION: 95 % | WEIGHT: 147.13 LBS | HEIGHT: 65 IN | SYSTOLIC BLOOD PRESSURE: 127 MMHG | TEMPERATURE: 98.3 F

## 2020-10-07 DIAGNOSIS — J43.2 CENTRILOBULAR EMPHYSEMA (HCC): ICD-10-CM

## 2020-10-07 DIAGNOSIS — I50.22 CHRONIC SYSTOLIC HEART FAILURE (HCC): Primary | ICD-10-CM

## 2020-10-07 DIAGNOSIS — I48.19 PERSISTENT ATRIAL FIBRILLATION (HCC): ICD-10-CM

## 2020-10-07 PROCEDURE — 99214 OFFICE O/P EST MOD 30 MIN: CPT | Performed by: NURSE PRACTITIONER

## 2020-10-07 NOTE — PROGRESS NOTES
[facility]  [facility]      10/07/2020         Nolvia Huff  [address] [address]  [unfilled]    1960    Alfonso Denney MD    Nolvia Huff is a 60 y.o. female.      Subjective:     Chief Complaint:  Establish Care, Atrial Fibrillation, and Congestive Heart Failure       HPI 60-year-old female with A. fib, systolic heart failure and COPD presents to the office for ongoing evaluation.  During hospital stay she underwent an AV node and implantation of leadless pacemaker per Dr. Solomon.She was admitted to [facility] 9/21/ 2020-10/1/2020.  Patient notes that she is doing significantly better since AV node and leadless pacemaker implant.  She was sent home on oxygen 24 hours a day and is only wearing it at night.She is currently raising 2 of her grandchildren and a great nephew who is 2 months old. Notes that she is having mild nosebleeds when she blows her nose. Denies chest pain, dizziness, dyspnea, presyncope, syncope, orthopnea, pnd, pedal edema.       Patient Active Problem List   Diagnosis   • Tobacco abuse   • Anxiety disorder   • Dyslipidemia   • Emphysema/COPD (CMS/HCC)   • GERD    • Osteoporosis   • Personal history of tobacco use, presenting hazards to health   • COPD with acute exacerbation   • Nocturnal hypoxia on 2L NC    • Pulmonary emphysema (CMS/HCC)   • A/C Respiratory Failure Type 2   • AF w/RVR    • Former smoker   • Atrial fibrillation with rapid ventricular response (CMS/HCC)   • Acute systolic CHF (congestive heart failure) (CMS/HCC)       Past Medical History:   Diagnosis Date   • Acute otitis media     H/o   • H/O chest x-ray 10/04/2011    Cardiac silhouute normal limits. The pulmonary vasculature appears normal, The diaphragms are flattened bilaterally which is sug of COPD. Lungs are grossly clear bilaterally with no prominent nodules or masses seen. No change when compared to previous exam from 11/10/09   • H/O chest x-ray  2011    Chronic change. No active disease   • H/O chest x-ray 11/10/2009    Moderate degenerative changes of thoracic spine. Cardiac silhoutte appears normal. There is slight hyperexpansion. There were a few scattered calcified granulomas. No acute appearing infiltrates noted. no real change compared to 10/20/08   • History of PFTs 2015    Severe OAD w/ dec in FEV from prior. no resp to BDRX. MVV reduced. FVC reduced; sugg restricitve pattern   • History of PFTs 10/21/2013    Mod severe OAD, FVC normal, no restriction, MVV reduced   • History of PFTs 2012    Severe OAD, FVC reduced, MVV dec   • Normal stillborn     Pt had stillborn infant       Past Surgical History:   Procedure Laterality Date   • BREAST CYST ASPIRATION Right    • CARDIAC ELECTROPHYSIOLOGY PROCEDURE N/A 2020    Procedure: MICRA PACEMAKER IMPLANTATION + AVN;  Surgeon: Maximus Solomon DO;  Location: [facility] INVASIVE LOCATION;  Service: Cardiology;  Laterality: N/A;   • HYSTERECTOMY     • TONSILLECTOMY         Family History   Problem Relation Age of Onset   • Emphysema Mother    • Cancer Mother         laryngeal carcinoma   • Heart murmur Father    • Cerebral palsy Sister    • Breast cancer Sister    • No Known Problems Son    • Cancer Maternal Grandfather         laryngeal carcinoma.   • Asthma Brother    • Heart murmur Brother    • Heart disease Maternal Grandmother    • No Known Problems Paternal Grandmother    • No Known Problems Paternal Grandfather    • Other Son          at birth       Social History     Socioeconomic History   • Marital status:      Spouse name: Not on file   • Number of children: Not on file   • Years of education: Not on file   • Highest education level: Not on file   Tobacco Use   • Smoking status: Former Smoker     Packs/day: 2.00     Years: 30.00     Pack years: 60.00     Types: Cigarettes     Quit date:      Years since quittin.7   • Smokeless tobacco: Never Used   •  Tobacco comment: fomer smoker 2 ppd x 30 years-Still smoke 1 cig, sporadically   Substance and Sexual Activity   • Alcohol use: Yes     Frequency: Monthly or less     Drinks per session: 1 or 2     Binge frequency: Never     Comment: 1/2 drinks per year   • Drug use: Not Currently     Types: Marijuana     Comment: occasionally smoked marijuana   • Sexual activity: Defer   Social History Narrative    Caffeine: 1 cup coffee daily and some soda       Allergies   Allergen Reactions   • Penicillins Anaphylaxis   • Tikosyn [Dofetilide] Other (See Comments)     Torsades         Current Outpatient Medications:   •  albuterol (PROVENTIL) (2.5 MG/3ML) 0.083% nebulizer solution, Take 2.5 mg by nebulization 4 (Four) Times a Day As Needed for Wheezing., Disp: 120 vial, Rfl: 5  •  albuterol sulfate  (90 Base) MCG/ACT inhaler, Inhale 2 puffs Every 4 (Four) Hours As Needed for Wheezing., Disp: 18 g, Rfl: 11  •  apixaban (ELIQUIS) 5 MG tablet tablet, Take 1 tablet by mouth Every 12 (Twelve) Hours. Indications: Atrial Fibrillation, Disp: 60 tablet, Rfl: 0  •  Fluticasone Furoate-Vilanterol (Breo Ellipta) 200-25 MCG/INH inhaler, Inhale 1 puff Daily., Disp: 60 each, Rfl: 6  •  guaiFENesin (MUCINEX) 600 MG 12 hr tablet, Take 1 tablet by mouth 2 (Two) Times a Day., Disp: 60 tablet, Rfl: 6  •  montelukast (SINGULAIR) 10 MG tablet, Take 1 tablet by mouth Every Night., Disp: 30 tablet, Rfl: 11  •  omeprazole (priLOSEC) 40 MG capsule, Take 1 capsule by mouth Daily., Disp: 30 capsule, Rfl: 11  •  tiotropium (Spiriva HandiHaler) 18 MCG per inhalation capsule, Place 1 capsule into inhaler and inhale Daily., Disp: 90 capsule, Rfl: 3  •  apixaban (ELIQUIS) 5 MG tablet tablet, Take 1 tablet by mouth 2 (Two) Times a Day., Disp: 60 tablet, Rfl: 3  •  triamcinolone (KENALOG) 0.1 % cream, APPLY SPARINGLY AND MASSAGE IN TWICE DAILY, Disp: 45 g, Rfl: 0    Current Facility-Administered Medications:   •  albuterol (PROVENTIL HFA;VENTOLIN HFA)  inhaler 2 puff, 2 puff, Inhalation, PRN, Bruins, Samreen A, APRN    The following portions of the patient's history were reviewed today and updated as appropriate: allergies, current medications, past family history, past medical history, past social history, past surgical history and problem list     Review of Systems   Constitution: Positive for malaise/fatigue. Negative for chills, decreased appetite, diaphoresis, fever, night sweats, weight gain and weight loss.   HENT: Positive for nosebleeds (mild ). Negative for congestion, hearing loss and hoarse voice.    Eyes: Negative for blurred vision, visual disturbance and visual halos.   Cardiovascular: Positive for dyspnea on exertion. Negative for chest pain, claudication, cyanosis, irregular heartbeat, leg swelling, near-syncope, orthopnea, palpitations, paroxysmal nocturnal dyspnea and syncope.   Respiratory: Negative for cough, hemoptysis, shortness of breath, sleep disturbances due to breathing, snoring, sputum production and wheezing.    Hematologic/Lymphatic: Negative for bleeding problem. Does not bruise/bleed easily.   Skin: Negative for dry skin, itching and rash.   Musculoskeletal: Negative for arthritis, falls, joint pain, joint swelling and myalgias.   Gastrointestinal: Negative for bloating, abdominal pain, constipation, diarrhea, flatus, heartburn, hematemesis, hematochezia, melena, nausea and vomiting.   Genitourinary: Negative for dysuria, frequency, hematuria, nocturia and urgency.   Neurological: Negative for excessive daytime sleepiness, dizziness, headaches, light-headedness, loss of balance and weakness.   Psychiatric/Behavioral: Negative for depression. The patient does not have insomnia and is not nervous/anxious.        Objective:     Vitals:    10/07/20 0854 10/07/20 0855 10/07/20 0856   BP: 118/73 123/60 127/63   BP Location: Left arm Left arm Right arm   Patient Position: Standing Sitting Sitting   Cuff Size: Adult Adult Adult   Pulse: 88  "85 85   Resp:   18   Temp:   98.3 °F (36.8 °C)   TempSrc:   Temporal   SpO2: 90% 94% 95%   Weight:   66.7 kg (147 lb 2 oz)   Height:   165.1 cm (65\")       Body mass index is 24.48 kg/m².    Vitals signs and nursing note reviewed.   Constitutional:       General: Not in acute distress.     Appearance: Well-developed.   Eyes:      Conjunctiva/sclera: Conjunctivae normal.      Pupils: Pupils are equal, round, and reactive to light.   HENT:      Head: Normocephalic and atraumatic.   Neck:      Musculoskeletal: Normal range of motion and neck supple.      Thyroid: No thyromegaly.      Vascular: No JVD.      Trachea: No tracheal deviation.   Pulmonary:      Effort: Pulmonary effort is normal.      Breath sounds: Normal breath sounds.   Cardiovascular:      PMI at left midclavicular line. Normal rate. Regular rhythm. Normal S1. Normal S2.      Murmurs: There is no murmur.      No gallop. No click. No rub.   Pulses:     Intact distal pulses.   Edema:     Peripheral edema absent.   Abdominal:      General: Bowel sounds are normal. There is no distension.      Palpations: Abdomen is soft.      Tenderness: There is no abdominal tenderness.   Musculoskeletal: Normal range of motion.   Skin:     General: Skin is warm and dry.      Comments: Mild ecchymosis noted right groin, pea sized nodule noted in right groin. NO hematoma noted.    Neurological:      Mental Status: Alert and oriented to person, place, and time.   Psychiatric:         Behavior: Behavior normal. Behavior is cooperative.         Lab and Diagnostic Review:  Results for orders placed or performed during the hospital encounter of 09/21/20   Blood Culture - Blood, Hand, Left    Specimen: Hand, Left; Blood   Result Value Ref Range    Blood Culture No growth at 5 days    Blood Culture - Blood, Arm, Right    Specimen: Arm, Right; Blood   Result Value Ref Range    Blood Culture No growth at 5 days    Respiratory Panel PCR w/COVID-19(SARS-CoV-2) BLANCA/JAMILA/HEENA/PAD/COR/MAD " In-House, NP Swab in [facility], 3-4 HR TAT - Swab, Nasopharynx    Specimen: Nasopharynx; Swab   Result Value Ref Range    ADENOVIRUS, PCR Not Detected Not Detected    Coronavirus 229E Not Detected Not Detected    Coronavirus HKU1 Not Detected Not Detected    Coronavirus NL63 Not Detected Not Detected    Coronavirus OC43 Not Detected Not Detected    COVID19 Not Detected Not Detected - Ref. Range    Human Metapneumovirus Not Detected Not Detected    Human Rhinovirus/Enterovirus Not Detected Not Detected    Influenza A PCR Not Detected Not Detected    Influenza A H1 Not Detected Not Detected    Influenza A H1 2009 PCR Not Detected Not Detected    Influenza A H3 Not Detected Not Detected    Influenza B PCR Not Detected Not Detected    Parainfluenza Virus 1 Not Detected Not Detected    Parainfluenza Virus 2 Not Detected Not Detected    Parainfluenza Virus 3 Not Detected Not Detected    Parainfluenza Virus 4 Not Detected Not Detected    RSV, PCR Not Detected Not Detected    Bordetella pertussis pcr Not Detected Not Detected    Bordetella parapertussis PCR Not Detected Not Detected    Chlamydophila pneumoniae PCR Not Detected Not Detected    Mycoplasma pneumo by PCR Not Detected Not Detected   Comprehensive Metabolic Panel    Specimen: Blood   Result Value Ref Range    Glucose 326 (H) 65 - 99 mg/dL    BUN 17 8 - 23 mg/dL    Creatinine 0.91 0.57 - 1.00 mg/dL    Sodium 138 136 - 145 mmol/L    Potassium 4.8 3.5 - 5.2 mmol/L    Chloride 100 98 - 107 mmol/L    CO2 31.0 (H) 22.0 - 29.0 mmol/L    Calcium 8.6 8.6 - 10.5 mg/dL    Total Protein 6.3 6.0 - 8.5 g/dL    Albumin 4.10 3.50 - 5.20 g/dL    ALT (SGPT) 91 (H) 1 - 33 U/L    AST (SGOT) 41 (H) 1 - 32 U/L    Alkaline Phosphatase 80 39 - 117 U/L    Total Bilirubin 0.5 0.0 - 1.2 mg/dL    eGFR Non African Amer 63 >60 mL/min/1.73    Globulin 2.2 gm/dL    A/G Ratio 1.9 g/dL    BUN/Creatinine Ratio 18.7 7.0 - 25.0    Anion Gap 7.0 5.0 - 15.0 mmol/L   Lipase    Specimen: Blood   Result  Value Ref Range    Lipase 28 13 - 60 U/L   BNP    Specimen: Blood   Result Value Ref Range    proBNP 3,421.0 (H) 0.0 - 900.0 pg/mL   Troponin    Specimen: Blood   Result Value Ref Range    Troponin T <0.010 0.000 - 0.030 ng/mL   Lactic Acid, Plasma    Specimen: Blood   Result Value Ref Range    Lactate 1.7 0.5 - 2.0 mmol/L   Ethanol    Specimen: Blood   Result Value Ref Range    Ethanol <10 0 - 10 mg/dL   TSH    Specimen: Blood   Result Value Ref Range    TSH 5.430 (H) 0.270 - 4.200 uIU/mL   CBC Auto Differential    Specimen: Blood   Result Value Ref Range    WBC 12.05 (H) 3.40 - 10.80 10*3/mm3    RBC 4.53 3.77 - 5.28 10*6/mm3    Hemoglobin 13.7 12.0 - 15.9 g/dL    Hematocrit 45.9 34.0 - 46.6 %    .3 (H) 79.0 - 97.0 fL    MCH 30.2 26.6 - 33.0 pg    MCHC 29.8 (L) 31.5 - 35.7 g/dL    RDW 12.0 (L) 12.3 - 15.4 %    RDW-SD 45.0 37.0 - 54.0 fl    MPV 12.2 (H) 6.0 - 12.0 fL    Platelets 328 140 - 450 10*3/mm3    Neutrophil % 71.3 42.7 - 76.0 %    Lymphocyte % 19.8 19.6 - 45.3 %    Monocyte % 7.3 5.0 - 12.0 %    Eosinophil % 0.7 0.3 - 6.2 %    Basophil % 0.4 0.0 - 1.5 %    Immature Grans % 0.5 0.0 - 0.5 %    Neutrophils, Absolute 8.60 (H) 1.70 - 7.00 10*3/mm3    Lymphocytes, Absolute 2.38 0.70 - 3.10 10*3/mm3    Monocytes, Absolute 0.88 0.10 - 0.90 10*3/mm3    Eosinophils, Absolute 0.08 0.00 - 0.40 10*3/mm3    Basophils, Absolute 0.05 0.00 - 0.20 10*3/mm3    Immature Grans, Absolute 0.06 (H) 0.00 - 0.05 10*3/mm3    nRBC 0.0 0.0 - 0.2 /100 WBC   Urinalysis With Microscopic If Indicated (No Culture) - Urine, Clean Catch    Specimen: Urine, Clean Catch   Result Value Ref Range    Color, UA Dark Yellow (A) Yellow, Straw    Appearance, UA Cloudy (A) Clear    pH, UA <=5.0 5.0 - 8.0    Specific Gravity, UA 1.026 1.001 - 1.030    Glucose, UA Negative Negative    Ketones, UA Negative Negative    Bilirubin, UA Negative Negative    Blood, UA Negative Negative    Protein,  mg/dL (2+) (A) Negative    Leuk Esterase, UA Trace  (A) Negative    Nitrite, UA Positive (A) Negative    Urobilinogen, UA 1.0 E.U./dL 0.2 - 1.0 E.U./dL   Urine Drug Screen - Urine, Clean Catch    Specimen: Urine, Clean Catch   Result Value Ref Range    THC, Screen, Urine Positive (A) Negative    Phencyclidine (PCP), Urine Negative Negative    Cocaine Screen, Urine Negative Negative    Methamphetamine, Ur Negative Negative    Opiate Screen Negative Negative    Amphetamine Screen, Urine Negative Negative    Benzodiazepine Screen, Urine Negative Negative    Tricyclic Antidepressants Screen Negative Negative    Methadone Screen, Urine Negative Negative    Barbiturates Screen, Urine Negative Negative    Oxycodone Screen, Urine Negative Negative    Propoxyphene Screen Negative Negative    Buprenorphine, Screen, Urine Negative Negative   Blood Gas, Arterial With Co-Ox    Specimen: Arterial Blood   Result Value Ref Range    Dante's Test N/A     pH, Arterial 7.221 (L) 7.350 - 7.450 pH units    pCO2, Arterial 64.4 (H) 35.0 - 45.0 mm Hg    pO2, Arterial 157.0 (H) 83.0 - 108.0 mm Hg    HCO3, Arterial 26.4 (H) 20.0 - 26.0 mmol/L    Base Excess, Arterial -2.6 (L) 0.0 - 2.0 mmol/L    Hemoglobin, Blood Gas 13.2 (L) 14 - 18 g/dL    Hematocrit, Blood Gas 40.4 %    Oxyhemoglobin 98.1 94 - 99 %    Methemoglobin 0.60 0.00 - 1.50 %    Carboxyhemoglobin 0.7 0 - 2 %    CO2 Content 28.4 22 - 33 mmol/L    Temperature 37.0 C    Barometric Pressure for Blood Gas      Modality BiPap     FIO2 50 %    Rate 0 Breaths/minute    PIP 0 cmH2O    IPAP 0     EPAP 0     Note      pH, Temp Corrected 7.221 pH Units    pCO2, Temperature Corrected 64.4 (H) 35 - 45 mm Hg    pO2, Temperature Corrected 157 (H) 83 - 108 mm Hg   Urinalysis, Microscopic Only - Urine, Clean Catch    Specimen: Urine, Clean Catch   Result Value Ref Range    RBC, UA 3-6 (A) None Seen, 0-2 /HPF    WBC, UA 6-12 (A) None Seen, 0-2 /HPF    Bacteria, UA 3+ (A) None Seen, Trace /HPF    Squamous Epithelial Cells, UA 3-6 (A) None Seen, 0-2  /HPF    Hyaline Casts, UA None Seen 0 - 6 /LPF    WBC Clumps, UA Small/1+ None Seen /HPF    Methodology Manual Light Microscopy    aPTT    Specimen: Blood   Result Value Ref Range    PTT 28.5 (L) 50.0 - 95.0 seconds   Heparin Anti-Xa    Specimen: Blood   Result Value Ref Range    Heparin Anti-Xa (UFH) 0.10 (L) 0.30 - 0.70 IU/ml   Protime-INR    Specimen: Blood   Result Value Ref Range    Protime 14.3 (H) 11.5 - 14.0 Seconds    INR 1.14 0.85 - 1.16   Phosphorus    Specimen: Blood   Result Value Ref Range    Phosphorus 6.7 (H) 2.5 - 4.5 mg/dL   Heparin Anti-Xa    Specimen: Blood   Result Value Ref Range    Heparin Anti-Xa (UFH) 0.16 (L) 0.30 - 0.70 IU/ml   Heparin Anti-Xa    Specimen: Blood   Result Value Ref Range    Heparin Anti-Xa (UFH) 0.20 (L) 0.30 - 0.70 IU/ml   Comprehensive Metabolic Panel    Specimen: Blood   Result Value Ref Range    Glucose 164 (H) 65 - 99 mg/dL    BUN 15 8 - 23 mg/dL    Creatinine 0.73 0.57 - 1.00 mg/dL    Sodium 138 136 - 145 mmol/L    Potassium 5.3 (H) 3.5 - 5.2 mmol/L    Chloride 101 98 - 107 mmol/L    CO2 28.0 22.0 - 29.0 mmol/L    Calcium 8.6 8.6 - 10.5 mg/dL    Total Protein 5.6 (L) 6.0 - 8.5 g/dL    Albumin 3.50 3.50 - 5.20 g/dL    ALT (SGPT) 68 (H) 1 - 33 U/L    AST (SGOT) 21 1 - 32 U/L    Alkaline Phosphatase 66 39 - 117 U/L    Total Bilirubin 0.5 0.0 - 1.2 mg/dL    eGFR Non African Amer 81 >60 mL/min/1.73    Globulin 2.1 gm/dL    A/G Ratio 1.7 g/dL    BUN/Creatinine Ratio 20.5 7.0 - 25.0    Anion Gap 9.0 5.0 - 15.0 mmol/L   Magnesium    Specimen: Blood   Result Value Ref Range    Magnesium 2.2 1.6 - 2.4 mg/dL   Phosphorus    Specimen: Blood   Result Value Ref Range    Phosphorus 3.4 2.5 - 4.5 mg/dL   CBC Auto Differential    Specimen: Blood   Result Value Ref Range    WBC 17.22 (H) 3.40 - 10.80 10*3/mm3    RBC 4.12 3.77 - 5.28 10*6/mm3    Hemoglobin 12.5 12.0 - 15.9 g/dL    Hematocrit 40.7 34.0 - 46.6 %    MCV 98.8 (H) 79.0 - 97.0 fL    MCH 30.3 26.6 - 33.0 pg    MCHC 30.7 (L)  31.5 - 35.7 g/dL    RDW 12.1 (L) 12.3 - 15.4 %    RDW-SD 43.8 37.0 - 54.0 fl    MPV 12.1 (H) 6.0 - 12.0 fL    Platelets 226 140 - 450 10*3/mm3    Neutrophil % 90.3 (H) 42.7 - 76.0 %    Lymphocyte % 5.8 (L) 19.6 - 45.3 %    Monocyte % 3.0 (L) 5.0 - 12.0 %    Eosinophil % 0.0 (L) 0.3 - 6.2 %    Basophil % 0.1 0.0 - 1.5 %    Immature Grans % 0.8 (H) 0.0 - 0.5 %    Neutrophils, Absolute 15.56 (H) 1.70 - 7.00 10*3/mm3    Lymphocytes, Absolute 1.00 0.70 - 3.10 10*3/mm3    Monocytes, Absolute 0.52 0.10 - 0.90 10*3/mm3    Eosinophils, Absolute 0.00 0.00 - 0.40 10*3/mm3    Basophils, Absolute 0.01 0.00 - 0.20 10*3/mm3    Immature Grans, Absolute 0.13 (H) 0.00 - 0.05 10*3/mm3    nRBC 0.0 0.0 - 0.2 /100 WBC   Heparin Anti-Xa    Specimen: Blood   Result Value Ref Range    Heparin Anti-Xa (UFH) 0.68 0.30 - 0.70 IU/ml   Hemoglobin A1c    Specimen: Blood   Result Value Ref Range    Hemoglobin A1C 6.30 (H) 4.80 - 5.60 %   Heparin Anti-Xa    Specimen: Blood   Result Value Ref Range    Heparin Anti-Xa (UFH) 0.52 0.30 - 0.70 IU/ml   Procalcitonin    Specimen: Blood   Result Value Ref Range    Procalcitonin 0.16 0.00 - 0.25 ng/mL   Heparin Anti-Xa    Specimen: Blood   Result Value Ref Range    Heparin Anti-Xa (UFH) 0.35 0.30 - 0.70 IU/ml   Basic Metabolic Panel    Specimen: Blood   Result Value Ref Range    Glucose 128 (H) 65 - 99 mg/dL    BUN 37 (H) 8 - 23 mg/dL    Creatinine 1.01 (H) 0.57 - 1.00 mg/dL    Sodium 138 136 - 145 mmol/L    Potassium 5.3 (H) 3.5 - 5.2 mmol/L    Chloride 99 98 - 107 mmol/L    CO2 27.0 22.0 - 29.0 mmol/L    Calcium 9.1 8.6 - 10.5 mg/dL    eGFR Non African Amer 56 (L) >60 mL/min/1.73    BUN/Creatinine Ratio 36.6 (H) 7.0 - 25.0    Anion Gap 12.0 5.0 - 15.0 mmol/L   Magnesium    Specimen: Blood   Result Value Ref Range    Magnesium 2.5 (H) 1.6 - 2.4 mg/dL   proBNP    Specimen: Blood   Result Value Ref Range    proBNP 4,745.0 (H) 0.0 - 900.0 pg/mL   Heparin Anti-Xa    Specimen: Blood   Result Value Ref Range     Heparin Anti-Xa (UFH) 0.32 0.30 - 0.70 IU/ml   Magnesium    Specimen: Blood   Result Value Ref Range    Magnesium 2.4 1.6 - 2.4 mg/dL   Heparin Anti-Xa    Specimen: Blood   Result Value Ref Range    Heparin Anti-Xa (UFH) 0.49 0.30 - 0.70 IU/ml   Basic Metabolic Panel    Specimen: Blood   Result Value Ref Range    Glucose 110 (H) 65 - 99 mg/dL    BUN 33 (H) 8 - 23 mg/dL    Creatinine 0.63 0.57 - 1.00 mg/dL    Sodium 136 136 - 145 mmol/L    Potassium 4.8 3.5 - 5.2 mmol/L    Chloride 101 98 - 107 mmol/L    CO2 29.0 22.0 - 29.0 mmol/L    Calcium 8.8 8.6 - 10.5 mg/dL    eGFR Non African Amer 96 >60 mL/min/1.73    BUN/Creatinine Ratio 52.4 (H) 7.0 - 25.0    Anion Gap 6.0 5.0 - 15.0 mmol/L   Magnesium    Specimen: Blood   Result Value Ref Range    Magnesium 2.4 1.6 - 2.4 mg/dL   Phosphorus    Specimen: Blood   Result Value Ref Range    Phosphorus 3.6 2.5 - 4.5 mg/dL   proBNP    Specimen: Blood   Result Value Ref Range    proBNP 3,371.0 (H) 0.0 - 900.0 pg/mL   Heparin Anti-Xa    Specimen: Blood   Result Value Ref Range    Heparin Anti-Xa (UFH) 0.32 0.30 - 0.70 IU/ml   CBC Auto Differential    Specimen: Blood   Result Value Ref Range    WBC 15.29 (H) 3.40 - 10.80 10*3/mm3    RBC 4.01 3.77 - 5.28 10*6/mm3    Hemoglobin 11.9 (L) 12.0 - 15.9 g/dL    Hematocrit 39.0 34.0 - 46.6 %    MCV 97.3 (H) 79.0 - 97.0 fL    MCH 29.7 26.6 - 33.0 pg    MCHC 30.5 (L) 31.5 - 35.7 g/dL    RDW 12.6 12.3 - 15.4 %    RDW-SD 44.9 37.0 - 54.0 fl    MPV 11.6 6.0 - 12.0 fL    Platelets 243 140 - 450 10*3/mm3    Neutrophil % 86.2 (H) 42.7 - 76.0 %    Lymphocyte % 5.2 (L) 19.6 - 45.3 %    Monocyte % 7.2 5.0 - 12.0 %    Eosinophil % 0.0 (L) 0.3 - 6.2 %    Basophil % 0.1 0.0 - 1.5 %    Immature Grans % 1.3 (H) 0.0 - 0.5 %    Neutrophils, Absolute 13.18 (H) 1.70 - 7.00 10*3/mm3    Lymphocytes, Absolute 0.80 0.70 - 3.10 10*3/mm3    Monocytes, Absolute 1.10 (H) 0.10 - 0.90 10*3/mm3    Eosinophils, Absolute 0.00 0.00 - 0.40 10*3/mm3    Basophils, Absolute  0.01 0.00 - 0.20 10*3/mm3    Immature Grans, Absolute 0.20 (H) 0.00 - 0.05 10*3/mm3    nRBC 0.0 0.0 - 0.2 /100 WBC   Magnesium    Specimen: Blood   Result Value Ref Range    Magnesium 3.8 (H) 1.6 - 2.4 mg/dL   Basic Metabolic Panel    Specimen: Blood   Result Value Ref Range    Glucose 177 (H) 65 - 99 mg/dL    BUN 22 8 - 23 mg/dL    Creatinine 0.54 (L) 0.57 - 1.00 mg/dL    Sodium 137 136 - 145 mmol/L    Potassium 4.6 3.5 - 5.2 mmol/L    Chloride 100 98 - 107 mmol/L    CO2 29.0 22.0 - 29.0 mmol/L    Calcium 8.4 (L) 8.6 - 10.5 mg/dL    eGFR Non African Amer 114 >60 mL/min/1.73    BUN/Creatinine Ratio 40.7 (H) 7.0 - 25.0    Anion Gap 8.0 5.0 - 15.0 mmol/L   Magnesium    Specimen: Blood   Result Value Ref Range    Magnesium 2.6 (H) 1.6 - 2.4 mg/dL   Basic Metabolic Panel    Specimen: Blood   Result Value Ref Range    Glucose 69 65 - 99 mg/dL    BUN 19 8 - 23 mg/dL    Creatinine 0.55 (L) 0.57 - 1.00 mg/dL    Sodium 140 136 - 145 mmol/L    Potassium 4.3 3.5 - 5.2 mmol/L    Chloride 101 98 - 107 mmol/L    CO2 34.0 (H) 22.0 - 29.0 mmol/L    Calcium 8.7 8.6 - 10.5 mg/dL    eGFR Non African Amer 112 >60 mL/min/1.73    BUN/Creatinine Ratio 34.5 (H) 7.0 - 25.0    Anion Gap 5.0 5.0 - 15.0 mmol/L   CBC Auto Differential    Specimen: Blood   Result Value Ref Range    WBC 11.51 (H) 3.40 - 10.80 10*3/mm3    RBC 4.02 3.77 - 5.28 10*6/mm3    Hemoglobin 12.0 12.0 - 15.9 g/dL    Hematocrit 38.9 34.0 - 46.6 %    MCV 96.8 79.0 - 97.0 fL    MCH 29.9 26.6 - 33.0 pg    MCHC 30.8 (L) 31.5 - 35.7 g/dL    RDW 12.4 12.3 - 15.4 %    RDW-SD 44.1 37.0 - 54.0 fl    MPV 11.3 6.0 - 12.0 fL    Platelets 210 140 - 450 10*3/mm3    Neutrophil % 81.8 (H) 42.7 - 76.0 %    Lymphocyte % 8.9 (L) 19.6 - 45.3 %    Monocyte % 8.6 5.0 - 12.0 %    Eosinophil % 0.2 (L) 0.3 - 6.2 %    Basophil % 0.1 0.0 - 1.5 %    Immature Grans % 0.4 0.0 - 0.5 %    Neutrophils, Absolute 9.42 (H) 1.70 - 7.00 10*3/mm3    Lymphocytes, Absolute 1.02 0.70 - 3.10 10*3/mm3    Monocytes,  Absolute 0.99 (H) 0.10 - 0.90 10*3/mm3    Eosinophils, Absolute 0.02 0.00 - 0.40 10*3/mm3    Basophils, Absolute 0.01 0.00 - 0.20 10*3/mm3    Immature Grans, Absolute 0.05 0.00 - 0.05 10*3/mm3    nRBC 0.0 0.0 - 0.2 /100 WBC   Magnesium    Specimen: Blood   Result Value Ref Range    Magnesium 2.1 1.6 - 2.4 mg/dL   Basic Metabolic Panel    Specimen: Blood   Result Value Ref Range    Glucose 76 65 - 99 mg/dL    BUN 16 8 - 23 mg/dL    Creatinine 0.57 0.57 - 1.00 mg/dL    Sodium 138 136 - 145 mmol/L    Potassium 4.2 3.5 - 5.2 mmol/L    Chloride 100 98 - 107 mmol/L    CO2 32.0 (H) 22.0 - 29.0 mmol/L    Calcium 8.7 8.6 - 10.5 mg/dL    eGFR Non African Amer 107 >60 mL/min/1.73    BUN/Creatinine Ratio 28.1 (H) 7.0 - 25.0    Anion Gap 6.0 5.0 - 15.0 mmol/L   Magnesium    Specimen: Blood   Result Value Ref Range    Magnesium 2.1 1.6 - 2.4 mg/dL   Basic Metabolic Panel    Specimen: Blood   Result Value Ref Range    Glucose 100 (H) 65 - 99 mg/dL    BUN 14 8 - 23 mg/dL    Creatinine 0.58 0.57 - 1.00 mg/dL    Sodium 141 136 - 145 mmol/L    Potassium 4.4 3.5 - 5.2 mmol/L    Chloride 101 98 - 107 mmol/L    CO2 32.0 (H) 22.0 - 29.0 mmol/L    Calcium 8.4 (L) 8.6 - 10.5 mg/dL    eGFR Non African Amer 105 >60 mL/min/1.73    BUN/Creatinine Ratio 24.1 7.0 - 25.0    Anion Gap 8.0 5.0 - 15.0 mmol/L   Magnesium    Specimen: Blood   Result Value Ref Range    Magnesium 2.2 1.6 - 2.4 mg/dL   Basic Metabolic Panel    Specimen: Blood   Result Value Ref Range    Glucose 125 (H) 65 - 99 mg/dL    BUN 18 8 - 23 mg/dL    Creatinine 0.59 0.57 - 1.00 mg/dL    Sodium 139 136 - 145 mmol/L    Potassium 5.0 3.5 - 5.2 mmol/L    Chloride 98 98 - 107 mmol/L    CO2 34.0 (H) 22.0 - 29.0 mmol/L    Calcium 9.1 8.6 - 10.5 mg/dL    eGFR Non African Amer 103 >60 mL/min/1.73    BUN/Creatinine Ratio 30.5 (H) 7.0 - 25.0    Anion Gap 7.0 5.0 - 15.0 mmol/L   CBC Auto Differential    Specimen: Blood   Result Value Ref Range    WBC 11.74 (H) 3.40 - 10.80 10*3/mm3    RBC  4.79 3.77 - 5.28 10*6/mm3    Hemoglobin 14.5 12.0 - 15.9 g/dL    Hematocrit 46.4 34.0 - 46.6 %    MCV 96.9 79.0 - 97.0 fL    MCH 30.3 26.6 - 33.0 pg    MCHC 31.3 (L) 31.5 - 35.7 g/dL    RDW 12.3 12.3 - 15.4 %    RDW-SD 43.5 37.0 - 54.0 fl    MPV 10.8 6.0 - 12.0 fL    Platelets 278 140 - 450 10*3/mm3    Neutrophil % 71.7 42.7 - 76.0 %    Lymphocyte % 16.4 (L) 19.6 - 45.3 %    Monocyte % 9.4 5.0 - 12.0 %    Eosinophil % 1.8 0.3 - 6.2 %    Basophil % 0.3 0.0 - 1.5 %    Immature Grans % 0.4 0.0 - 0.5 %    Neutrophils, Absolute 8.41 (H) 1.70 - 7.00 10*3/mm3    Lymphocytes, Absolute 1.93 0.70 - 3.10 10*3/mm3    Monocytes, Absolute 1.10 (H) 0.10 - 0.90 10*3/mm3    Eosinophils, Absolute 0.21 0.00 - 0.40 10*3/mm3    Basophils, Absolute 0.04 0.00 - 0.20 10*3/mm3    Immature Grans, Absolute 0.05 0.00 - 0.05 10*3/mm3    nRBC 0.0 0.0 - 0.2 /100 WBC   Magnesium    Specimen: Blood   Result Value Ref Range    Magnesium 2.2 1.6 - 2.4 mg/dL   Basic Metabolic Panel    Specimen: Blood   Result Value Ref Range    Glucose 116 (H) 65 - 99 mg/dL    BUN 16 8 - 23 mg/dL    Creatinine 0.60 0.57 - 1.00 mg/dL    Sodium 139 136 - 145 mmol/L    Potassium 4.6 3.5 - 5.2 mmol/L    Chloride 101 98 - 107 mmol/L    CO2 30.0 (H) 22.0 - 29.0 mmol/L    Calcium 9.0 8.6 - 10.5 mg/dL    eGFR Non African Amer 101 >60 mL/min/1.73    BUN/Creatinine Ratio 26.7 (H) 7.0 - 25.0    Anion Gap 8.0 5.0 - 15.0 mmol/L   Phosphorus    Specimen: Blood   Result Value Ref Range    Phosphorus 3.8 2.5 - 4.5 mg/dL   CBC Auto Differential    Specimen: Blood   Result Value Ref Range    WBC 10.76 3.40 - 10.80 10*3/mm3    RBC 4.66 3.77 - 5.28 10*6/mm3    Hemoglobin 14.0 12.0 - 15.9 g/dL    Hematocrit 44.8 34.0 - 46.6 %    MCV 96.1 79.0 - 97.0 fL    MCH 30.0 26.6 - 33.0 pg    MCHC 31.3 (L) 31.5 - 35.7 g/dL    RDW 12.4 12.3 - 15.4 %    RDW-SD 43.8 37.0 - 54.0 fl    MPV 10.6 6.0 - 12.0 fL    Platelets 271 140 - 450 10*3/mm3    Neutrophil % 70.6 42.7 - 76.0 %    Lymphocyte % 17.6 (L)  19.6 - 45.3 %    Monocyte % 9.5 5.0 - 12.0 %    Eosinophil % 1.3 0.3 - 6.2 %    Basophil % 0.3 0.0 - 1.5 %    Immature Grans % 0.7 (H) 0.0 - 0.5 %    Neutrophils, Absolute 7.61 (H) 1.70 - 7.00 10*3/mm3    Lymphocytes, Absolute 1.89 0.70 - 3.10 10*3/mm3    Monocytes, Absolute 1.02 (H) 0.10 - 0.90 10*3/mm3    Eosinophils, Absolute 0.14 0.00 - 0.40 10*3/mm3    Basophils, Absolute 0.03 0.00 - 0.20 10*3/mm3    Immature Grans, Absolute 0.07 (H) 0.00 - 0.05 10*3/mm3    nRBC 0.0 0.0 - 0.2 /100 WBC   Magnesium    Specimen: Blood   Result Value Ref Range    Magnesium 2.1 1.6 - 2.4 mg/dL   Basic Metabolic Panel    Specimen: Blood   Result Value Ref Range    Glucose 98 65 - 99 mg/dL    BUN 16 8 - 23 mg/dL    Creatinine 0.69 0.57 - 1.00 mg/dL    Sodium 140 136 - 145 mmol/L    Potassium 4.4 3.5 - 5.2 mmol/L    Chloride 103 98 - 107 mmol/L    CO2 31.0 (H) 22.0 - 29.0 mmol/L    Calcium 8.8 8.6 - 10.5 mg/dL    eGFR Non African Amer 86 >60 mL/min/1.73    BUN/Creatinine Ratio 23.2 7.0 - 25.0    Anion Gap 6.0 5.0 - 15.0 mmol/L   Magnesium    Specimen: Blood   Result Value Ref Range    Magnesium 2.4 1.6 - 2.4 mg/dL   Basic Metabolic Panel    Specimen: Blood   Result Value Ref Range    Glucose 90 65 - 99 mg/dL    BUN 16 8 - 23 mg/dL    Creatinine 0.58 0.57 - 1.00 mg/dL    Sodium 137 136 - 145 mmol/L    Potassium 4.3 3.5 - 5.2 mmol/L    Chloride 101 98 - 107 mmol/L    CO2 28.0 22.0 - 29.0 mmol/L    Calcium 8.7 8.6 - 10.5 mg/dL    eGFR Non African Amer 105 >60 mL/min/1.73    BUN/Creatinine Ratio 27.6 (H) 7.0 - 25.0    Anion Gap 8.0 5.0 - 15.0 mmol/L   POC Creatinine    Specimen: Blood   Result Value Ref Range    Creatinine 0.90 0.60 - 1.30 mg/dL   POC Surgery Labs    Specimen: Blood   Result Value Ref Range    Ionized Calcium 1.25 1.20 - 1.32 mmol/L    POC Potassium 4.7 3.5 - 4.9 mmol/L    Sodium 139 138 - 146 mmol/L    Total CO2 36 (H) 24 - 29 mmol/L    Hemoglobin 15.3 12.0 - 17.0 g/dL    Hematocrit 45 38 - 51 %    pCO2, Arterial 102.7  (H) 35 - 45 mm Hg    pO2, Arterial 33 (L) 80 - 105 mmHg    Base Excess 3.0000 -5 - 5 mmol/L    O2 Saturation, Arterial 41 (L) 95 - 98 %    pH, Arterial 7.11 (L) 7.35 - 7.6 pH units    HCO3, Arterial 32.8 (H) 22 - 26 mmol/L    Glucose 311 (H) 70 - 130 mg/dL   POC Glucose Once    Specimen: Blood   Result Value Ref Range    Glucose 179 (H) 70 - 130 mg/dL   POC Glucose Once    Specimen: Blood   Result Value Ref Range    Glucose 186 (H) 70 - 130 mg/dL   POC Glucose Once    Specimen: Blood   Result Value Ref Range    Glucose 205 (H) 70 - 130 mg/dL   POC Glucose Once    Specimen: Blood   Result Value Ref Range    Glucose 156 (H) 70 - 130 mg/dL   POC Glucose Once    Specimen: Blood   Result Value Ref Range    Glucose 170 (H) 70 - 130 mg/dL   POC Glucose Once    Specimen: Blood   Result Value Ref Range    Glucose 198 (H) 70 - 130 mg/dL   POC Glucose Once    Specimen: Blood   Result Value Ref Range    Glucose 228 (H) 70 - 130 mg/dL   POC Glucose Once    Specimen: Blood   Result Value Ref Range    Glucose 140 (H) 70 - 130 mg/dL   POC Glucose Once    Specimen: Blood   Result Value Ref Range    Glucose 126 70 - 130 mg/dL   POC Glucose Once    Specimen: Blood   Result Value Ref Range    Glucose 148 (H) 70 - 130 mg/dL   POC Glucose Once    Specimen: Blood   Result Value Ref Range    Glucose 266 (H) 70 - 130 mg/dL   POC Glucose Once    Specimen: Blood   Result Value Ref Range    Glucose 107 70 - 130 mg/dL   POC Glucose Once    Specimen: Blood   Result Value Ref Range    Glucose 139 (H) 70 - 130 mg/dL   POC Glucose Once    Specimen: Blood   Result Value Ref Range    Glucose 210 (H) 70 - 130 mg/dL   POC Glucose Once    Specimen: Blood   Result Value Ref Range    Glucose 169 (H) 70 - 130 mg/dL   POC Glucose Once    Specimen: Blood   Result Value Ref Range    Glucose 78 70 - 130 mg/dL   POC Glucose Once    Specimen: Blood   Result Value Ref Range    Glucose 99 70 - 130 mg/dL   POC Glucose Once    Specimen: Blood   Result Value Ref  Range    Glucose 157 (H) 70 - 130 mg/dL   POC Glucose Once    Specimen: Blood   Result Value Ref Range    Glucose 217 (H) 70 - 130 mg/dL   POC Glucose Once    Specimen: Blood   Result Value Ref Range    Glucose 75 70 - 130 mg/dL   POC Glucose Once    Specimen: Blood   Result Value Ref Range    Glucose 92 70 - 130 mg/dL   POC Glucose Once    Specimen: Blood   Result Value Ref Range    Glucose 133 (H) 70 - 130 mg/dL   POC Glucose Once    Specimen: Blood   Result Value Ref Range    Glucose 180 (H) 70 - 130 mg/dL   POC Glucose Once    Specimen: Blood   Result Value Ref Range    Glucose 128 70 - 130 mg/dL   POC Glucose Once    Specimen: Blood   Result Value Ref Range    Glucose 141 (H) 70 - 130 mg/dL   POC Glucose Once    Specimen: Blood   Result Value Ref Range    Glucose 96 70 - 130 mg/dL   POC Glucose Once    Specimen: Blood   Result Value Ref Range    Glucose 138 (H) 70 - 130 mg/dL   POC Glucose Once    Specimen: Blood   Result Value Ref Range    Glucose 125 70 - 130 mg/dL   POC Glucose Once    Specimen: Blood   Result Value Ref Range    Glucose 158 (H) 70 - 130 mg/dL   POC Glucose Once    Specimen: Blood   Result Value Ref Range    Glucose 87 70 - 130 mg/dL   POC Glucose Once    Specimen: Blood   Result Value Ref Range    Glucose 137 (H) 70 - 130 mg/dL   POC Glucose Once    Specimen: Blood   Result Value Ref Range    Glucose 108 70 - 130 mg/dL   POC Glucose Once    Specimen: Blood   Result Value Ref Range    Glucose 115 70 - 130 mg/dL   POC Glucose Once    Specimen: Blood   Result Value Ref Range    Glucose 109 70 - 130 mg/dL   POC Glucose Once    Specimen: Blood   Result Value Ref Range    Glucose 187 (H) 70 - 130 mg/dL   POC Glucose Once    Specimen: Blood   Result Value Ref Range    Glucose 114 70 - 130 mg/dL   POC Glucose Once    Specimen: Blood   Result Value Ref Range    Glucose 102 70 - 130 mg/dL   POC Glucose Once    Specimen: Blood   Result Value Ref Range    Glucose 130 70 - 130 mg/dL   POC Glucose Once     Specimen: Blood   Result Value Ref Range    Glucose 251 (H) 70 - 130 mg/dL   POC Glucose Once    Specimen: Blood   Result Value Ref Range    Glucose 164 (H) 70 - 130 mg/dL   POC Glucose Once    Specimen: Blood   Result Value Ref Range    Glucose 230 (H) 70 - 130 mg/dL   Adult Transthoracic Echo Complete W/ Cont if Necessary Per Protocol   Result Value Ref Range    BH CV VAS BP RIGHT ARM 93/64 mmHg    BSA 1.8 m^2    IVSd 1.1 cm    LVIDd 4.3 cm    LVIDs 3.3 cm    LVPWd 1.1 cm    IVS/LVPW 0.95     FS 22.5 %    EDV(Teich) 83.1 ml    ESV(Teich) 45.2 ml    EF(Teich) 45.6 %    EDV(cubed) 79.6 ml    ESV(cubed) 37.0 ml    EF(cubed) 53.4 %    LV mass(C)d 160.2 grams    LV mass(C)dI 91.3 grams/m^2    SV(Teich) 37.9 ml    SI(Teich) 21.6 ml/m^2    SV(cubed) 42.5 ml    SI(cubed) 24.2 ml/m^2    MV Diam 3.0 cm    Ao root diam 3.0 cm    Ao root area 7.0 cm^2    LA dimension 4.1 cm    LA/Ao 1.4     LVOT diam 2.0 cm    LVOT area 3.1 cm^2    LVOT area(traced) 3.1 cm^2    LAd major 5.4 cm    LVLd ap4 7.0 cm    EDV(MOD-sp4) 78.0 ml    LVLs ap4 7.3 cm    ESV(MOD-sp4) 53.0 ml    EF(MOD-sp4) 32.1 %    LVLd ap2 7.4 cm    EDV(MOD-sp2) 65.0 ml    LVLs ap2 6.7 cm    ESV(MOD-sp2) 41.0 ml    EF(MOD-sp2) 36.9 %    LA volume 72.0 ml    SV(MOD-sp4) 25.0 ml    SI(MOD-sp4) 14.2 ml/m^2    SV(MOD-sp2) 24.0 ml    SI(MOD-sp2) 13.7 ml/m^2    Ao root area (BSA corrected) 1.7     LV Diamond Vol (BSA corrected) 44.4 ml/m^2    LV Sys Vol (BSA corrected) 30.2 ml/m^2    LA Volume Index 41.0 ml/m^2    MV E max francis 74.6 cm/sec    MV V2 max 106.7 cm/sec    MV max PG 4.6 mmHg    MV V2 mean 78.9 cm/sec    MV mean PG 2.7 mmHg    MV V2 VTI 18.6 cm    MV area (1 diam) 7.2 cm^2    MVA(VTI) 1.5 cm^2    MV Flow area(1diam) 7.2 cm^2    MV dec time 0.17 sec    Ao pk francis 123.1 cm/sec    Ao max PG 6.1 mmHg    Ao max PG (full) 4.5 mmHg    AMINTA(V,A) 1.6 cm^2    AMINTA(V,D) 1.6 cm^2    LV V1 max PG 1.6 mmHg    LV V1 mean PG 0.92 mmHg    LV V1 max 63.2 cm/sec    LV V1 mean 44.0 cm/sec     LV V1 VTI 8.8 cm    MR max molina 409.6 cm/sec    MR max PG 68.0 mmHg    MR mean molina 323.5 cm/sec    MR mean PG 45.6 mmHg    MR .5 cm    MR PISA 2.0 cm^2    MR flow rate 61.3 cm^3/sec    MR ERO 0.15 cm^2    MR volume 15.3 ml    MR PISA radius 0.56 cm    MR alias molina 30.8 cm/sec    SV(MV 1 diam) 134.1 ml    SI(MV 1 diam) 76.4 ml/m^2    SV(LVOT) 27.4 ml    SI(LVOT) 15.6 ml/m^2    TR max molina 286 cm/sec    TR max PG 33 mmHg    RF(MV,LVOT)(1diam) 0.8     Lat E/e'  7.4     Med E/e' 7.4     Lat Peak E' Molina 10.0 cm/sec    Med Peak E' Molina 10.0 cm/sec     CV ECHO HOLLY - BZI_BMI 25.1 kilograms/m^2     CV ECHO HOLLY - BSA(Sezion) 1.8 m^2     CV ECHO HOLLY - BZI_METRIC_WEIGHT 68.5 kg     CV ECHO HOLLY - BZI_METRIC_HEIGHT 165.1 cm    Avg E/e' ratio 7.46     RV Base 3.30 cm    RV Length 6.60 cm    RV Mid 2.90 cm    MV regurgitant fraction 14 %    MV vena contracta 0.65 cm    PISA ALIASING MOLINA 3.10 m/s    Radius 0.6 cm    RAP systole 15 mmHg    RVSP(TR) 48 mmHg   Adult Transesophageal Echo (IRMA) W/ Cont if Necessary Per Protocol   Result Value Ref Range    BSA 1.7 m^2     CV ECHO HOLLY - BZI_BMI 25.9 kilograms/m^2     CV ECHO HOLLY - BSA(Sezion) 1.8 m^2     CV ECHO HOLLY - BZI_METRIC_WEIGHT 68.5 kg     CV ECHO HOLLY - BZI_METRIC_HEIGHT 162.6 cm     CV VAS BP LEFT ARM 92/69 mmHg    Echo EF Estimated 45 %         Assessment and Plan:   1. Chronic systolic heart failure (CMS/HCC)  Improvement in EF after av node /PPM placement  euvolemic today  Heart failure education today including signs and symptoms, the role of the heart failure center, daily weights, low sodium diet (less than 1500 mg per day), and daily physical activity. Reviewed HF Zones with patient and family.  Patient to continue current medications as previously ordered.   2. Centrilobular emphysema (CMS/HCC)  Oxygen daily     3. Persistent atrial fibrillation (CMS/HCC)  S/p av node /leadless PPM implant   CHADS-VASc Risk Assessment            2        Total Score        1 CHF    1 Sex: Female        Criteria that do not apply:    Hypertension    Age >/= 75    DM    PRIOR STROKE/TIA/THROMBO    Vascular Disease    Age 65-74        Anticoagulated with eliquis and notes mild epistaxis  Encouraged patient to use saline nose spray             It has been a pleasure to participate in the care of this patient.  Patient was instructed to call the [facility] with any questions, concerns, or worsening symptoms.    *Please note that portions of this note were completed with a voice recognition program. Efforts were made to edit the dictations, but occasionally words are mistranscribed.

## 2020-10-12 ENCOUNTER — READMISSION MANAGEMENT (OUTPATIENT)
Dept: CALL CENTER | Facility: HOSPITAL | Age: 60
End: 2020-10-12

## 2020-10-12 NOTE — OUTREACH NOTE
General Surgery Week 2 Survey      Responses   [facility] patient discharged from?  [facility]   Does the patient have one of the following disease processes/diagnoses(primary or secondary)?  General Surgery   Week 2 attempt successful?  Yes   Call start time  1156   Call end time  1209   Discharge diagnosis  MICRA PACEMAKER IMPLANTATION + AVN   Meds reviewed with patient/caregiver?  Yes   Is the patient having any side effects they believe may be caused by any medication additions or changes?  No   Does the patient have all medications related to this admission filled (includes all antibiotics, pain medications, etc.)  Yes   Is the patient taking all medications as directed (includes completed medication regime)?  Yes   Does the patient have a follow up appointment scheduled with their surgeon?  Yes   Has the patient kept scheduled appointments due by today?  Yes   Has home health visited the patient within 72 hours of discharge?  N/A   Psychosocial issues?  Yes   Psychosocial comments  pt has been raising 2 grandchildren and recently acquired 2 month old to raise until daughter can resoved drug addiction, states has been manageable with assistance of    Did the patient receive a copy of their discharge instructions?  Yes   Nursing interventions  Reviewed instructions with patient   What is the patient's perception of their health status since discharge?  Improving   Nursing interventions  Nurse provided patient education   Is the patient /caregiver able to teach back basic post-op care?  Lifting as instructed by MD in discharge instructions, Keep incision areas clean,dry and protected, No tub bath, swimming, or hot tub until instructed by MD, Take showers only when approved by MD-sponge bathe until then   Is the patient/caregiver able to teach back signs and symptoms of incisional infection?  Increased redness, swelling or pain at the incisonal site, Increased drainage or bleeding, Incisional warmth,  Pus or odor from incision, Fever   Is the patient/caregiver able to teach back steps to recovery at home?  Set small, achievable goals for return to baseline health, Rest and rebuild strength, gradually increase activity   Is the patient/caregiver able to teach back the hierarchy of who to call/visit for symptoms/problems? PCP, Specialist, Home health nurse, Urgent Care, ED, 911  Yes   Additional teach back comments  pt states incision healing well   Week 2 call completed?  Yes          Marjorie Mae RN

## 2020-10-12 NOTE — DISCHARGE SUMMARY
[facility] Medicine Services  DISCHARGE SUMMARY    Patient Name: Nolvia Huff  : 1960  MRN: [id number]    Date of Admission: 2020  5:18 AM  Date of Discharge:  10/1/20  Primary Care Physician: Alfonso Denney MD    Consults     Date and Time Order Name Status Description    2020 0831 Inpatient Cardiology Consult Completed           Hospital Course     Presenting Problem:   Atrial fibrillation with rapid ventricular response (CMS/HCC) [I48.91]    Active Hospital Problems    Diagnosis  POA   • **A/C Respiratory Failure Type 2 [J96.21, J96.22]  Yes   • Acute systolic CHF (congestive heart failure) (CMS/HCC) [I50.21]  Yes   • AF w/RVR  [I48.0]  Yes   • Atrial fibrillation with rapid ventricular response (CMS/HCC) [I48.91]  Yes   • COPD with acute exacerbation [J44.1]  Yes   • GERD  [K21.9]  Yes      Resolved Hospital Problems   No resolved problems to display.          Hospital Course:  Nolvia Huff is a 60 y.o. female with past medical history significant for COPD, patient is on 2 L of oxygen nightly at home, GERD, hyperlipidemia, anxiety.  Patient presented to the emergency room on 2020 with severe shortness of breath.  Patient was found to be in atrial fibrillation with rapid ventricular response.  Initial evaluation included an ABG at the emergency room which was consistent with hypercapnic respiratory failure and respiratory acidosis.  Patient was placed on BiPAP.  2 attempts was made to do ECV without any success.  Patient was put on Cardizem drip and digoxin and was initially admitted to the ICU.  Chest x-ray also was significant for infiltrate and patient was started on antibiotics.  Patient also was treated with steroids while in ICU for COPD.  Cardiology was consulted and patient was started on Tikosyn however patient had an episode of torsade on 2020 and Tikosyn was discontinued.  With all the treatments patient improved significantly although the  heart rate was still not controlled.  Patient was transferred today medicine floor on 9/28/2020.  Patient had AV node ablation and implantation of a headless cardiac pacemaker on 9/30/2020.  After this procedure patient's symptoms improved very significantly.  Patient was very eager to go home and was discharged on 10/1/2020.  At the time of discharge patient was hemodynamically stable.        Discharge Follow Up Recommendations for outpatient labs/diagnostics:       Day of Discharge     HPI:   Resting in bed in no acute distress and overall comfortable.  No chest pain or palpitation.  No shortness of breath at rest.  No fever or chills.    Review of Systems      Vital Signs:         Physical Exam:  Constitutional: No acute distress, looks comfortable.  HENT: NCAT, mucous membranes moist  Respiratory: Clear to auscultation bilaterally, respiratory effort normal   Cardiovascular: Pacemaker pulse, no murmurs, rubs, or gallops.  Gastrointestinal: Positive bowel sounds, soft, nontender, nondistended  Musculoskeletal: No bilateral ankle edema  Psychiatric: Appropriate affect, cooperative  Neurologic: Oriented x 3, strength symmetric in all extremities, Cranial Nerves grossly intact to confrontation, speech clear  Skin: No rashes    Pertinent  and/or Most Recent Results               Invalid input(s): PROT, LABALBU        Invalid input(s): TG, LDLCALC, LDLREALC        Brief Urine Lab Results  (Last result in the past 365 days)      Color   Clarity   Blood   Leuk Est   Nitrite   Protein   CREAT   Urine HCG        09/21/20 1116 Dark Yellow Cloudy Negative Trace Positive 100 mg/dL (2+)               Microbiology Results Abnormal     Procedure Component Value - Date/Time    Blood Culture - Blood, Hand, Left [[id number]] Collected: 09/21/20 0556    Lab Status: Final result Specimen: Blood from Hand, Left Updated: 09/26/20 0631     Blood Culture No growth at 5 days    Blood Culture - Blood, Arm, Right [[id number]] Collected:  09/21/20 0556    Lab Status: Final result Specimen: Blood from Arm, Right Updated: 09/26/20 0631     Blood Culture No growth at 5 days    COVID PRE-OP / PRE-PROCEDURE SCREENING ORDER (NO ISOLATION) - Swab, Nasopharynx [[id number]]  (Normal) Collected: 09/21/20 0844    Lab Status: Final result Specimen: Swab from Nasopharynx Updated: 09/21/20 0949    Narrative:      The following orders were created for panel order COVID PRE-OP / PRE-PROCEDURE SCREENING ORDER (NO ISOLATION) - Swab, Nasopharynx.  Procedure                               Abnormality         Status                     ---------                               -----------         ------                     Respiratory Panel PCR w/...[[id number]]  Normal              Final result                 Please view results for these tests on the individual orders.    Respiratory Panel PCR w/COVID-19(SARS-CoV-2) [facility] In-House, NP Swab in UTM/VTM, 3-4 HR TAT - Swab, Nasopharynx [[id number]]  (Normal) Collected: 09/21/20 0844    Lab Status: Final result Specimen: Swab from Nasopharynx Updated: 09/21/20 0949     ADENOVIRUS, PCR Not Detected     Coronavirus 229E Not Detected     Coronavirus HKU1 Not Detected     Coronavirus NL63 Not Detected     Coronavirus OC43 Not Detected     COVID19 Not Detected     Human Metapneumovirus Not Detected     Human Rhinovirus/Enterovirus Not Detected     Influenza A PCR Not Detected     Influenza A H1 Not Detected     Influenza A H1 2009 PCR Not Detected     Influenza A H3 Not Detected     Influenza B PCR Not Detected     Parainfluenza Virus 1 Not Detected     Parainfluenza Virus 2 Not Detected     Parainfluenza Virus 3 Not Detected     Parainfluenza Virus 4 Not Detected     RSV, PCR Not Detected     Bordetella pertussis pcr Not Detected     Bordetella parapertussis PCR Not Detected     Chlamydophila pneumoniae PCR Not Detected     Mycoplasma pneumo by PCR Not Detected    Narrative:      Fact sheet for providers:  https://docs.[employer]/wp-content/uploads/UNN6350-8057-AY8.1-EUA-Provider-Fact-Sheet-3.pdf    Fact sheet for patients: https://docs.[employer]/wp-content/uploads/APA2748-5508-VD8.1-EUA-Patient-Fact-Sheet-1.pdf          Imaging Results (All)     Procedure Component Value Units Date/Time    XR Chest 1 View [[id number]] Collected: 10/01/20 0831     Updated: 10/01/20 2118    Narrative:      EXAMINATION: XR CHEST 1 VW- 10/01/2020     INDICATION: dyspnea, hypoxia; I48.91-Unspecified atrial fibrillation;  J96.01-Acute respiratory failure with hypoxia; J96.02-Acute respiratory  failure with hypercapnia; A41.9-Sepsis, unspecified organism;  R65.20-Severe sepsis without septic shock; J96.02-Acute respiratory  failure with hypercapnia; J43.9-Emphysema, unspecified; I48.19-Other  persistent atrial fibrillation     COMPARISON: 09/30/2020     FINDINGS: Heart is normal in size. Leadless pacemaker is again noted  superimposed over the left heart shadow. Pulmonary vasculature appears  within normal limits. Mild chronic appearing interstitial lung changes  are again noted. No pneumothorax or effusion is seen.       Impression:      No new chest disease.     D:  10/01/2020  E:  10/01/2020         This report was finalized on 10/1/2020 9:15 PM by Dr. Jose Murcia MD.       XR Chest PA & Lateral [[id number]] Collected: 09/30/20 2239     Updated: 09/30/20 2242    Narrative:      CHEST X-RAY, 9/30/2020      HISTORY:    60-year-old female hospital inpatient with acute respiratory failure, hypoxia, atrial fibrillation.      TECHNIQUE:  AP and lateral chest series obtained with the patient sitting upright.    COMPARISON:  *  Chest x-ray, 9/24/2020.  *  CT chest, 9/21/2020.    FINDINGS:  Advanced pulmonary emphysema, greatest in the upper lobes. Superimposed mild diffuse interstitial edema has improved slightly. Small bilateral pleural effusions and mild bibasilar pulmonary atelectasis remain present. Mild cardiomegaly.      Impression:        1. Mild diffuse interstitial edema and small bilateral pleural effusions, slightly improved since 9/24/2020.  2. Severe pulmonary emphysema, greatest in the upper lobes.    Signer Name: Derek Jacinto MD   Signed: 9/30/2020 10:39 PM   Workstation Name: [email or web address]-    [facility]    XR Chest 1 View [[id number]] Collected: 09/24/20 0910     Updated: 09/24/20 0914    Narrative:      EXAMINATION: XR CHEST 1 VW-      INDICATION: hypoxia; I48.91-Unspecified atrial fibrillation;  J96.01-Acute respiratory failure with hypoxia; J96.02-Acute respiratory  failure with hypercapnia; A41.9-Sepsis, unspecified organism;  R65.20-Severe sepsis without septic shock; J96.02-Acute respiratory  failure with hypercapnia; J43.9-Emphysema, unspecified      COMPARISON: 9/21/2020     FINDINGS: Unchanged trace small pleural effusions and bilateral lung  base atelectasis changes. No new focal airspace opacity. No  pneumothorax. Unchanged mild cardiac enlargement.       Impression:      Unchanged trace small pleural effusions and bilateral lung  base atelectasis changes. No new focal airspace opacity. No  pneumothorax. Unchanged mild cardiac enlargement.     This report was finalized on 9/24/2020 9:11 AM by Derrick Rawls.       CT Chest Pulmonary Embolism [[id number]] Collected: 09/21/20 1321     Updated: 09/23/20 1248    Narrative:      EXAMINATION: CT CHEST PULMONARY EMBOLISM-09/21/2020:      INDICATION: Shortness of breath; I48.91-Unspecified atrial fibrillation;  J96.01-Acute respiratory failure with hypoxia; J96.02-Acute respiratory  failure with hypercapnia; A41.9-Sepsis, unspecified organism;  R65.20-Severe sepsis without septic shock; J96.02-Acute respiratory  failure with hypercapnia, chest pain and shortness of breath.     TECHNIQUE: Multiple axial CT imaging was obtained of the chest following  the administration of intravenous contrast according to the CT angio  protocol. 2-D coronal reformatted  images were submitted to further  facilitate diagnostic accuracy and treatment planning.     The radiation dose reduction device was turned on for each scan per the  ALARA (As Low as Reasonably Achievable) protocol.     COMPARISON: NONE.     FINDINGS: There are small bilateral pleural effusions identified.  Atelectatic changes seen in the lung bases bilaterally. No filling  defect in the pulmonary arteries to suggest evidence of pulmonary  embolism. No pericardial effusion. The cardiac chambers are within  normal limits. No significant atherosclerotic disease. There is no bulky  hilar or axillary adenopathy. Severe emphysematous changes seen  diffusely throughout the lung fields with some patchy groundglass  opacity identified in the upper lobes. Findings concerning for edema.  Clinical correlation is needed. There is no gross abnormality identified  in the upper abdomen.       Impression:      Bilateral pleural effusions small in size with atelectatic  changes seen in the lung bases and some groundglass opacity in the upper  lung fields concerning for possible edema. No evidence of pulmonary  embolism.     D:  09/21/2020  E:  09/21/2020     This report was finalized on 9/23/2020 12:44 PM by Dr. Renuka Moore MD.       XR Chest 1 View [[id number]] Collected: 09/21/20 0641     Updated: 09/21/20 0643    Narrative:      CR Chest 1 Vw    INDICATION:   Rest or stress morning. History of emphysema     COMPARISON:    1/30/2019    FINDINGS:  Single portable AP view(s) of the chest.  There is mild diffuse interstitial prominence which is new. Heart size normal. Bones are normal.      Impression:      Mild diffuse interstitial prominence without effusions    Signer Name: Isrrael Gunderson MD   Signed: 9/21/2020 6:41 AM   Workstation Name: [facility]    [facility]                    Results for orders placed during the hospital encounter of 09/21/20   Adult Transesophageal Echo (IRMA) W/ Cont if  Necessary Per Protocol    Addendum · This was a limited IRMA to rule out left atrial appendage thrombus · Mildly reduced left ventricular systolic function estimated EF = 45% · Left atrial cavity size is moderate to severely dilated. · Right atrial cavity size is severely dilated. · There was no left atrial appendage thrombus · Right ventricular cavity is mild to moderately dilated. Mildly reduced  right ventricular systolic function noted. · Moderate patent foramen ovale present with right to left shunting  indicated by saline contrast.        Honorio Arzate MD 9/23/2020  5:32 PM          Narrative · This was a limited IRMA to rule out left atrial appendage thrombus  · Mildly reduced left ventricular systolic function estimated EF = 45%  · Left atrial cavity size is moderate to severely dilated.  · Right atrial cavity size is severely dilated.  · There was no left atrial appendage thrombus  · Right ventricular cavity is mild to moderately dilated. Mildly reduced   right ventricular systolic function noted.  · Moderate patent foramen ovale present with right to left shunting   indicated by saline contrast.              Discharge Details        Discharge Medications      New Medications      Instructions Start Date   apixaban 5 MG tablet tablet  Commonly known as: ELIQUIS   5 mg, Oral, Every 12 Hours Scheduled         Continue These Medications      Instructions Start Date   albuterol sulfate  (90 Base) MCG/ACT inhaler  Commonly known as: PROVENTIL HFA;VENTOLIN HFA;PROAIR HFA   2 puffs, Inhalation, Every 4 Hours PRN      albuterol (2.5 MG/3ML) 0.083% nebulizer solution  Commonly known as: PROVENTIL   2.5 mg, Nebulization, 4 Times Daily PRN      Fluticasone Furoate-Vilanterol 200-25 MCG/INH inhaler  Commonly known as: Breo Ellipta   1 puff, Inhalation, Daily      guaiFENesin 600 MG 12 hr tablet  Commonly known as: Mucinex   600 mg, Oral, 2 Times Daily      montelukast 10 MG tablet  Commonly known as:  SINGULAIR   10 mg, Oral, Nightly      omeprazole 40 MG capsule  Commonly known as: priLOSEC   40 mg, Oral, Daily      tiotropium 18 MCG per inhalation capsule  Commonly known as: Spiriva HandiHaler   1 capsule, Inhalation, Daily      triamcinolone 0.1 % cream  Commonly known as: KENALOG   APPLY SPARINGLY AND MASSAGE IN TWICE DAILY         Stop These Medications    azithromycin 250 MG tablet  Commonly known as: Zithromax     predniSONE 10 MG tablet  Commonly known as: DELTASONE            Allergies   Allergen Reactions   • Penicillins Anaphylaxis   • Tikosyn [Dofetilide] Other (See Comments)     Torsades         Discharge Disposition:  Home or Self Care    Diet:  Hospital:No active diet order      Activity:  Activity Instructions     Resume regular activities as directed by Cardiology.                       CODE STATUS:    Code Status and Medical Interventions:   Ordered at: 09/21/20 0831     Level Of Support Discussed With:    Patient     Code Status:    CPR     Medical Interventions (Level of Support Prior to Arrest):    Full       Future Appointments   Date Time Provider Department Center   10/20/2020 10:00 AM Maryellen Higuera APRN [facility] None   10/22/2020 10:30 AM Alfonso Denney MD [facility] None   11/2/2020 10:15 AM Darcy Fernández APRN [facility]   1/4/2021 10:30 AM Maximus Solomon DO [facility] None       Additional Instructions for the Follow-ups that You Need to Schedule     Discharge Follow-up with PCP   As directed       Currently Documented PCP:    Alfonso Denney MD    PCP Phone Number:    [phone]     Follow Up Details: within one week         Discharge Follow-up with Specialty: Follow up in 3 months with Dr Solomon in office with [employer] Device check; 3 Months   As directed      Specialty: Follow up in 3 months with Dr Solomon in office with [employer] Device check    Follow Up: 3 Months         Discharge Follow-up with Specified Provider: cardiology as per schedule.   As directed       To: cardiology as per schedule.                     Armando Mtaias MD  10/12/20      Time Spent on Discharge:  I spent  25  minutes on this discharge activity which included: face-to-face encounter with the patient, reviewing the data in the system, coordination of the care with the nursing staff as well as consultants, documentation, and entering orders.    Electronically signed by Armando Matias MD, 10/12/20, 12:02 PM EDT.

## 2020-10-20 ENCOUNTER — OFFICE VISIT (OUTPATIENT)
Dept: PULMONOLOGY | Facility: CLINIC | Age: 60
End: 2020-10-20

## 2020-10-20 VITALS
DIASTOLIC BLOOD PRESSURE: 72 MMHG | HEART RATE: 86 BPM | BODY MASS INDEX: 24.12 KG/M2 | TEMPERATURE: 98.2 F | SYSTOLIC BLOOD PRESSURE: 102 MMHG | HEIGHT: 65 IN | WEIGHT: 144.8 LBS | OXYGEN SATURATION: 93 %

## 2020-10-20 DIAGNOSIS — J96.21 ACUTE ON CHRONIC RESPIRATORY FAILURE WITH HYPOXIA AND HYPERCAPNIA (HCC): ICD-10-CM

## 2020-10-20 DIAGNOSIS — J96.22 ACUTE ON CHRONIC RESPIRATORY FAILURE WITH HYPOXIA AND HYPERCAPNIA (HCC): ICD-10-CM

## 2020-10-20 DIAGNOSIS — G47.34 NOCTURNAL HYPOXIA: ICD-10-CM

## 2020-10-20 DIAGNOSIS — J43.2 CENTRILOBULAR EMPHYSEMA (HCC): Primary | ICD-10-CM

## 2020-10-20 PROCEDURE — 99214 OFFICE O/P EST MOD 30 MIN: CPT | Performed by: NURSE PRACTITIONER

## 2020-10-20 RX ORDER — ALBUTEROL SULFATE 90 UG/1
2 AEROSOL, METERED RESPIRATORY (INHALATION) EVERY 4 HOURS PRN
Qty: 18 G | Refills: 11 | Status: SHIPPED | OUTPATIENT
Start: 2020-10-20 | End: 2021-06-24

## 2020-10-20 RX ORDER — IPRATROPIUM BROMIDE AND ALBUTEROL SULFATE 2.5; .5 MG/3ML; MG/3ML
3 SOLUTION RESPIRATORY (INHALATION) 4 TIMES DAILY PRN
Qty: 360 ML | Refills: 3 | Status: SHIPPED | OUTPATIENT
Start: 2020-10-20 | End: 2021-07-22

## 2020-10-20 NOTE — PROGRESS NOTES
[facility] Pulmonary Follow up    CHIEF COMPLAINT    COPD    HISTORY OF PRESENT ILLNESS    Nolvia Huff is a 60 y.o.female here today for follow-up of her COPD.  She was last seen in the office in July by me.  She was hospitalized at [facility] On 9/21-10/1 for atrial fibrillation.  It was felt that this was triggered by COPD exacerbation.  She did have 2 unsuccessful ECV attempts.  She had an AV node ablation and permanent leadless cardiac pacemaker by Dr. Solomon on 9/30.  She was discharged the next day.  She states that she has been breathing much better since the placement of her pacemaker.    She is here today to discuss pulmonary rehabilitation.    She continues to take Breo and Spiriva daily for COPD.  She does have albuterol nebs but prefers DuoNeb's.  She is needing a refill of her DuoNeb's.  She does have shortness of breath with activity but does recover with rest quickly.    She denies fever, chills, sputum production, hemoptysis, night sweats, weight loss, chest pain or palpitations.  She denies any lower extremity edema or calf tenderness.  She will occasionally have some sinus or allergy symptoms.  She takes over-the-counter medication as needed.  She denies reflux symptoms.  She does take omeprazole daily.    She is going to receive her influenza vaccination later this week by her PCP.    She quit smoking in 2004 and has a 60-pack-year history.  Her last CT scan was in May 2020 that showed no nodule or mass concerning for malignancy.  Her next CT scan will be due in May 2021.  She does admit to the occasional marijuana usage.    Patient Active Problem List   Diagnosis   • Tobacco abuse   • Anxiety disorder   • Dyslipidemia   • Emphysema/COPD (CMS/HCC)   • GERD    • Osteoporosis   • Personal history of tobacco use, presenting hazards to health   • COPD with acute exacerbation   • Nocturnal hypoxia on 2L NC    • Pulmonary emphysema (CMS/HCC)   • A/C Respiratory Failure Type 2   • AF w/RVR     • Former smoker   • Atrial fibrillation with rapid ventricular response (CMS/HCC)   • Acute systolic CHF (congestive heart failure) (CMS/HCC)       Allergies   Allergen Reactions   • Penicillins Anaphylaxis   • Tikosyn [Dofetilide] Other (See Comments)     Torsades       Current Outpatient Medications:   •  albuterol sulfate  (90 Base) MCG/ACT inhaler, Inhale 2 puffs Every 4 (Four) Hours As Needed for Wheezing., Disp: 18 g, Rfl: 11  •  apixaban (ELIQUIS) 5 MG tablet tablet, Take 1 tablet by mouth Every 12 (Twelve) Hours. Indications: Atrial Fibrillation, Disp: 60 tablet, Rfl: 0  •  apixaban (ELIQUIS) 5 MG tablet tablet, Take 1 tablet by mouth 2 (Two) Times a Day., Disp: 60 tablet, Rfl: 3  •  Fluticasone Furoate-Vilanterol (Breo Ellipta) 200-25 MCG/INH inhaler, Inhale 1 puff Daily., Disp: 60 each, Rfl: 6  •  guaiFENesin (MUCINEX) 600 MG 12 hr tablet, Take 1 tablet by mouth 2 (Two) Times a Day., Disp: 60 tablet, Rfl: 6  •  ipratropium-albuterol (DUO-NEB) 0.5-2.5 mg/3 ml nebulizer, Take 3 mL by nebulization 4 (Four) Times a Day As Needed for Wheezing., Disp: 360 mL, Rfl: 3  •  montelukast (SINGULAIR) 10 MG tablet, Take 1 tablet by mouth Every Night., Disp: 30 tablet, Rfl: 11  •  omeprazole (priLOSEC) 40 MG capsule, Take 1 capsule by mouth Daily., Disp: 30 capsule, Rfl: 11  •  tiotropium (Spiriva HandiHaler) 18 MCG per inhalation capsule, Place 1 capsule into inhaler and inhale Daily., Disp: 90 capsule, Rfl: 3  •  triamcinolone (KENALOG) 0.1 % cream, APPLY SPARINGLY AND MASSAGE IN TWICE DAILY, Disp: 45 g, Rfl: 0    Current Facility-Administered Medications:   •  albuterol (PROVENTIL HFA;VENTOLIN HFA) inhaler 2 puff, 2 puff, Inhalation, PRN, Bruins, Samreen A, APRN  MEDICATION LIST AND ALLERGIES REVIEWED.    Social History     Tobacco Use   • Smoking status: Former Smoker     Packs/day: 2.00     Years: 30.00     Pack years: 60.00     Types: Cigarettes     Quit date:      Years since quittin.8   •  "Smokeless tobacco: Never Used   • Tobacco comment: fomer smoker 2 ppd x 30 years-Still smoke 1 cig, sporadically   Substance Use Topics   • Alcohol use: Yes     Frequency: Monthly or less     Drinks per session: 1 or 2     Binge frequency: Never     Comment: 1/2 drinks per year   • Drug use: Not Currently     Types: Marijuana     Comment: occasionally smoked marijuana       FAMILY AND SOCIAL HISTORY REVIEWED.    Review of Systems   Constitutional: Negative for activity change, appetite change, fatigue, fever and unexpected weight change.   HENT: Negative for congestion, postnasal drip, rhinorrhea, sinus pressure, sore throat and voice change.    Eyes: Negative for visual disturbance.   Respiratory: Positive for cough and shortness of breath. Negative for chest tightness and wheezing.    Cardiovascular: Negative for chest pain, palpitations and leg swelling.   Gastrointestinal: Negative for abdominal distention, abdominal pain, nausea and vomiting.   Endocrine: Negative for cold intolerance and heat intolerance.   Genitourinary: Negative for difficulty urinating and urgency.   Musculoskeletal: Negative for arthralgias, back pain and neck pain.   Skin: Negative for color change and pallor.   Allergic/Immunologic: Negative for environmental allergies and food allergies.   Neurological: Negative for dizziness, syncope, weakness and light-headedness.   Hematological: Negative for adenopathy. Does not bruise/bleed easily.   Psychiatric/Behavioral: Negative for agitation and behavioral problems.   .    /72   Pulse 86   Temp 98.2 °F (36.8 °C)   Ht 165.1 cm (65\")   Wt 65.7 kg (144 lb 12.8 oz)   LMP  (LMP Unknown)   SpO2 93% Comment: resting at room air  BMI 24.10 kg/m²     Immunization History   Administered Date(s) Administered   • Flu Vaccine Quad PF >36MO 10/04/2016, 01/30/2019   • Influenza TIV (IM) 10/20/2008   • Influenza, Unspecified 10/04/2016   • Tdap 06/29/2010       Physical Exam  Vitals signs and " nursing note reviewed.   Constitutional:       Appearance: She is well-developed. She is not diaphoretic.   HENT:      Head: Normocephalic and atraumatic.   Eyes:      Pupils: Pupils are equal, round, and reactive to light.   Neck:      Musculoskeletal: Normal range of motion and neck supple.      Thyroid: No thyromegaly.   Cardiovascular:      Rate and Rhythm: Normal rate and regular rhythm.      Heart sounds: Normal heart sounds. No murmur. No friction rub. No gallop.    Pulmonary:      Effort: Pulmonary effort is normal. No respiratory distress.      Breath sounds: Normal breath sounds. No wheezing or rales.      Comments: Diminished breath sounds in all lobes  Chest:      Chest wall: No tenderness.   Abdominal:      General: Bowel sounds are normal.      Palpations: Abdomen is soft.      Tenderness: There is no abdominal tenderness.   Musculoskeletal: Normal range of motion.   Lymphadenopathy:      Cervical: No cervical adenopathy.   Skin:     General: Skin is warm and dry.      Capillary Refill: Capillary refill takes less than 2 seconds.   Neurological:      Mental Status: She is alert and oriented to person, place, and time.   Psychiatric:         Behavior: Behavior normal.           RESULTS      PROBLEM LIST    Problem List Items Addressed This Visit        Respiratory    Emphysema/COPD (CMS/[facility]) - Primary    Overview      Emphysema/COPD (492.8)   · A.  Moderate to severe obstruction, former tobacco abuse.B.  Controlled with the      azithromycin every Monday Wednesday Friday.      B. 07/06/15 Spirometry reveals an increase in obstruction, FEV1 now 42% instead of 53%. Minute ventilation reduced. Forced vital capacity is reduced suggesting a restrictive pattern.          Relevant Medications    albuterol sulfate  (90 Base) MCG/ACT inhaler    ipratropium-albuterol (DUO-NEB) 0.5-2.5 mg/3 ml nebulizer    Nocturnal hypoxia on 2L NC     A/C Respiratory Failure Type 2            DISCUSSION    Ms. Huff was  here for follow-up of her COPD.  She seems to be doing fairly well from a pulmonary standpoint.  We did discuss pulmonary rehabilitation in the office today.  She will need a full PFT performed before she will qualify for pulmonary rehabilitation.  She states that she is not interested at this time due to the winter approaching.  She would like to start pulmonary rehab in the spring.  I did advise her that if she were to be hospitalized or her symptoms were to worsen over the winter she would need to start pulmonary rehab sooner and she was okay with this approach.    She will continue Breo and Spiriva daily for her COPD.  I did call in some DuoNeb's for her to use up to 4 times a day for her COPD.    She will continue 2 L nasal cannula at nighttime for her nocturnal hypoxia.    She will continue close follow-up with cardiology for her atrial fibrillation and pacemaker placement.    Based on her 60-pack-year history and quitting 2004 she no longer requires low-dose screenings for lung cancer as she is out of the 15-year window.    She will follow-up in 6 months with full PFTs or sooner if her symptoms worsen.  She will call with any additional concerns or questions.  I spent 25 minutes with the patient. I spent > 50% percent of this time counseling and discussing diagnosis, prognosis, diagnostic testing, evaluation, current status, treatment options and management.    Maryellen Higuera, APRN  10/20/[id number] EDT  Electronically signed     Please note that portions of this note were completed with a voice recognition program. Efforts were made to edit the dictations, but occasionally words are mistranscribed.      CC: Alfonso Denney MD

## 2020-10-21 ENCOUNTER — READMISSION MANAGEMENT (OUTPATIENT)
Dept: CALL CENTER | Facility: HOSPITAL | Age: 60
End: 2020-10-21

## 2020-10-21 NOTE — OUTREACH NOTE
General Surgery Week 3 Survey      Responses   [facility] patient discharged from?  [place]   Does the patient have one of the following disease processes/diagnoses(primary or secondary)?  General Surgery   Week 3 attempt successful?  Yes   Call start time  1440   Call end time  1442   Discharge diagnosis  MICRA PACEMAKER IMPLANTATION + AVN   Meds reviewed with patient/caregiver?  Yes   Is the patient having any side effects they believe may be caused by any medication additions or changes?  No   Does the patient have all medications related to this admission filled (includes all antibiotics, pain medications, etc.)  Yes   Is the patient taking all medications as directed (includes completed medication regime)?  Yes   Does the patient have a follow up appointment scheduled with their surgeon?  Yes   Has the patient kept scheduled appointments due by today?  Yes   Has home health visited the patient within 72 hours of discharge?  N/A   Psychosocial issues?  No   Did the patient receive a copy of their discharge instructions?  Yes   Nursing interventions  Reviewed instructions with patient   What is the patient's perception of their health status since discharge?  Improving   Nursing interventions  Nurse provided patient education   Is the patient /caregiver able to teach back basic post-op care?  Lifting as instructed by MD in discharge instructions, Keep incision areas clean,dry and protected, No tub bath, swimming, or hot tub until instructed by MD, Take showers only when approved by MD-sponge bathe until then   Is the patient/caregiver able to teach back signs and symptoms of incisional infection?  Increased redness, swelling or pain at the incisonal site, Increased drainage or bleeding, Incisional warmth, Pus or odor from incision, Fever   Is the patient/caregiver able to teach back steps to recovery at home?  Set small, achievable goals for return to baseline health, Rest and rebuild strength, gradually  increase activity   Is the patient/caregiver able to teach back the hierarchy of who to call/visit for symptoms/problems? PCP, Specialist, Home health nurse, Urgent Care, ED, 911  Yes   Week 3 call completed?  Yes          Aditya Tate RN

## 2020-10-22 ENCOUNTER — OFFICE VISIT (OUTPATIENT)
Dept: INTERNAL MEDICINE | Facility: CLINIC | Age: 60
End: 2020-10-22

## 2020-10-22 VITALS
RESPIRATION RATE: 20 BRPM | HEART RATE: 81 BPM | DIASTOLIC BLOOD PRESSURE: 70 MMHG | TEMPERATURE: 97.7 F | OXYGEN SATURATION: 93 % | SYSTOLIC BLOOD PRESSURE: 120 MMHG | WEIGHT: 142.5 LBS | BODY MASS INDEX: 23.71 KG/M2

## 2020-10-22 DIAGNOSIS — I48.91 ATRIAL FIBRILLATION WITH RAPID VENTRICULAR RESPONSE (HCC): ICD-10-CM

## 2020-10-22 DIAGNOSIS — Z23 NEED FOR INFLUENZA VACCINATION: Primary | ICD-10-CM

## 2020-10-22 DIAGNOSIS — Z23 NEED FOR PROPHYLACTIC VACCINATION AGAINST STREPTOCOCCUS PNEUMONIAE (PNEUMOCOCCUS): ICD-10-CM

## 2020-10-22 PROCEDURE — 90472 IMMUNIZATION ADMIN EACH ADD: CPT | Performed by: INTERNAL MEDICINE

## 2020-10-22 PROCEDURE — 90732 PPSV23 VACC 2 YRS+ SUBQ/IM: CPT | Performed by: INTERNAL MEDICINE

## 2020-10-22 PROCEDURE — 99214 OFFICE O/P EST MOD 30 MIN: CPT | Performed by: INTERNAL MEDICINE

## 2020-10-22 PROCEDURE — 90471 IMMUNIZATION ADMIN: CPT | Performed by: INTERNAL MEDICINE

## 2020-10-22 PROCEDURE — 90686 IIV4 VACC NO PRSV 0.5 ML IM: CPT | Performed by: INTERNAL MEDICINE

## 2020-10-22 NOTE — PROGRESS NOTES
Subjective   Nolvia Huff is a 60 y.o. female.     History of Present Illness     The following portions of the patient's history were reviewed and updated as appropriate: allergies, current medications, past family history, past medical history, past social history, past surgical history and problem list.    Hospital Visit  Admitted to [facility] ER on 9/21/20 presented to the ER with new onset atrial fibrillation  Refractory to oral medication and thus required  Cardiac ablation and pace maker had to be placed.  Patient says that she is doing a lot better and not having any side effects from the placement and currently is stable.  Patient denies any shortness of breath, chest pressure, chest pain, exercise intolerance or any other concerns at this time.  She is currently taking Eliquis for anticoagulation and says that she is having somewhat frequent nosebleeds and will discuss this with the cardiologist in regards to moving forward with anticoagulation.        Review of Systems   All other systems reviewed and are negative.      Objective   Physical Exam  Vitals signs and nursing note reviewed.   Constitutional:       Appearance: Normal appearance. She is normal weight.   HENT:      Head: Normocephalic and atraumatic.      Nose: Nose normal.      Mouth/Throat:      Mouth: Mucous membranes are moist.   Eyes:      Extraocular Movements: Extraocular movements intact.      Conjunctiva/sclera: Conjunctivae normal.      Pupils: Pupils are equal, round, and reactive to light.   Neck:      Musculoskeletal: Normal range of motion and neck supple.   Cardiovascular:      Rate and Rhythm: Normal rate and regular rhythm.      Pulses: Normal pulses.      Heart sounds: Normal heart sounds.   Pulmonary:      Effort: Pulmonary effort is normal.      Breath sounds: Normal breath sounds.   Abdominal:      General: Bowel sounds are normal.      Palpations: Abdomen is soft.   Musculoskeletal: Normal range of motion.   Skin:      General: Skin is warm.      Capillary Refill: Capillary refill takes less than 2 seconds.   Neurological:      General: No focal deficit present.      Mental Status: She is alert.           Assessment/Plan   Diagnoses and all orders for this visit:    Spent 25 minutes face-to-face with patient, 20 minutes use to discuss treatment and management of atrial fibrillation, 10 minutes you specifically to review previous outside medical records.    1. Need for influenza vaccination (Primary)  -     Fluarix/Fluzone/Afluria Quad>6 Months    2. Need for prophylactic vaccination against Streptococcus pneumoniae (pneumococcus)  -     Pneumococcal Polysaccharide Vaccine 23-Valent Greater Than or Equal To 1yo Subcutaneous / IM    3. Atrial fibrillation with rapid ventricular response (CMS/HCC)-continue with current medical management, follow-up with cardiology next month,

## 2020-10-28 ENCOUNTER — TELEPHONE (OUTPATIENT)
Dept: CARDIOLOGY | Facility: CLINIC | Age: 60
End: 2020-10-28

## 2020-10-28 NOTE — TELEPHONE ENCOUNTER
Called pt to remind her it is time to send in a pacemaker reading.  She will send in one later today.

## 2020-10-29 ENCOUNTER — READMISSION MANAGEMENT (OUTPATIENT)
Dept: CALL CENTER | Facility: HOSPITAL | Age: 60
End: 2020-10-29

## 2020-10-29 NOTE — OUTREACH NOTE
General Surgery Week 4 Survey      Responses   [facility] patient discharged from?  [place]   Does the patient have one of the following disease processes/diagnoses(primary or secondary)?  General Surgery   Week 4 attempt successful?  Yes   Call start time  1208   Call end time  1211   Discharge diagnosis  MICRA PACEMAKER IMPLANTATION + AVN   Medication alerts for this patient  eliquis   Is the patient taking all medications as directed (includes completed medication regime)?  Yes   Has the patient kept scheduled appointments due by today?  Yes   Is the patient still receiving Home Health Services?  N/A   Psychosocial issues?  No   What is the patient's perception of their health status since discharge?  Improving   Nursing interventions  Nurse provided patient education   Is the patient/caregiver able to teach back steps to recovery at home?  Set small, achievable goals for return to baseline health, Rest and rebuild strength, gradually increase activity, Eat a well-balance diet, Make a list of questions for surgeon's appointment   If the patient is a current smoker, are they able to teach back resources for cessation?  Not a smoker   Is the patient/caregiver able to teach back the hierarchy of who to call/visit for symptoms/problems? PCP, Specialist, Home health nurse, Urgent Care, ED, 911  Yes   Additional teach back comments  Incision is healing. Avoids salt and trans fat and candy.  Doing better with diet. Having nose bleeds and some headaches.   Week 4 call completed?  Yes   Would the patient like one additional call?  No   Graduated  Yes   Did the patient feel the follow up calls were helpful during their recovery period?  Yes   Was the number of calls appropriate?  Yes   Wrap up additional comments  Appt on Monday and will discuss issues.          Cassidy Mahajan RN

## 2020-11-02 ENCOUNTER — OFFICE VISIT (OUTPATIENT)
Dept: CARDIOLOGY | Facility: HOSPITAL | Age: 60
End: 2020-11-02

## 2020-11-02 VITALS
DIASTOLIC BLOOD PRESSURE: 57 MMHG | BODY MASS INDEX: 24.33 KG/M2 | TEMPERATURE: 98.4 F | HEART RATE: 70 BPM | HEIGHT: 65 IN | WEIGHT: 146.06 LBS | SYSTOLIC BLOOD PRESSURE: 101 MMHG | OXYGEN SATURATION: 99 %

## 2020-11-02 DIAGNOSIS — J43.2 CENTRILOBULAR EMPHYSEMA (HCC): ICD-10-CM

## 2020-11-02 DIAGNOSIS — I48.19 PERSISTENT ATRIAL FIBRILLATION (HCC): ICD-10-CM

## 2020-11-02 DIAGNOSIS — I50.22 CHRONIC SYSTOLIC HEART FAILURE (HCC): Primary | ICD-10-CM

## 2020-11-02 PROCEDURE — 99214 OFFICE O/P EST MOD 30 MIN: CPT | Performed by: NURSE PRACTITIONER

## 2020-11-02 NOTE — PROGRESS NOTES
[facility]  [facility]      11/02/2020         Nolvia Huff  [address] [address]  [unfilled]    1960    Alfonso Denney MD    Nolvia Huff is a 60 y.o. female.      Subjective:     Chief Complaint:  Atrial Fibrillation, Follow-up, and Congestive Heart Failure         HPI 60-year-old female presents the office today for ongoing evaluation of her chronic systolic heart failure.  She reports she is doing well from a cardiac standpoint.  She does note dyspnea on exertion and fatigue when she overdoes it but notes overall she is doing much better.  Denies chest pain, palpitations, presyncope, syncope, orthopnea, PND or pedal edema.  Patient reports she is very busy taking care of her 3 grandchildren.  Does note compliance with her medications.  Currently not on guideline directed medical therapy secondary to hypotension.  No beta-blocker due to severe COPD.  Patient reports she is only wearing her oxygen at night.  She does report mild nosebleeds throughout the day.    Patient Active Problem List   Diagnosis   • Tobacco abuse   • Anxiety disorder   • Dyslipidemia   • Emphysema/COPD (CMS/HCC)   • GERD    • Osteoporosis   • Personal history of tobacco use, presenting hazards to health   • COPD with acute exacerbation   • Nocturnal hypoxia on 2L NC    • Pulmonary emphysema (CMS/HCC)   • A/C Respiratory Failure Type 2   • AF w/RVR    • Former smoker   • Atrial fibrillation with rapid ventricular response (CMS/HCC)   • Acute systolic CHF (congestive heart failure) (CMS/HCC)       Past Medical History:   Diagnosis Date   • Acute otitis media     H/o   • H/O chest x-ray 10/04/2011    Cardiac silhouute normal limits. The pulmonary vasculature appears normal, The diaphragms are flattened bilaterally which is sug of COPD. Lungs are grossly clear bilaterally with no prominent nodules or masses seen. No change when compared to previous exam from 11/10/09   • H/O chest  x-ray 2011    Chronic change. No active disease   • H/O chest x-ray 11/10/2009    Moderate degenerative changes of thoracic spine. Cardiac silhoutte appears normal. There is slight hyperexpansion. There were a few scattered calcified granulomas. No acute appearing infiltrates noted. no real change compared to 10/20/08   • History of PFTs 2015    Severe OAD w/ dec in FEV from prior. no resp to BDRX. MVV reduced. FVC reduced; sugg restricitve pattern   • History of PFTs 10/21/2013    Mod severe OAD, FVC normal, no restriction, MVV reduced   • History of PFTs 2012    Severe OAD, FVC reduced, MVV dec   • Normal stillborn     Pt had stillborn infant       Past Surgical History:   Procedure Laterality Date   • BREAST CYST ASPIRATION Right    • CARDIAC ELECTROPHYSIOLOGY PROCEDURE N/A 2020    Procedure: MICRA PACEMAKER IMPLANTATION + AVN;  Surgeon: Maximus Solomon DO;  Location: [facility] INVASIVE LOCATION;  Service: Cardiology;  Laterality: N/A;   • HYSTERECTOMY     • TONSILLECTOMY         Family History   Problem Relation Age of Onset   • Emphysema Mother    • Cancer Mother         laryngeal carcinoma   • Heart murmur Father    • Cerebral palsy Sister    • Breast cancer Sister    • No Known Problems Son    • Cancer Maternal Grandfather         laryngeal carcinoma.   • Asthma Brother    • Heart murmur Brother    • Heart disease Maternal Grandmother    • No Known Problems Paternal Grandmother    • No Known Problems Paternal Grandfather    • Other Son          at birth       Social History     Socioeconomic History   • Marital status:      Spouse name: Not on file   • Number of children: Not on file   • Years of education: Not on file   • Highest education level: Not on file   Tobacco Use   • Smoking status: Former Smoker     Packs/day: 2.00     Years: 30.00     Pack years: 60.00     Types: Cigarettes     Quit date:      Years since quittin.8   • Smokeless tobacco: Never Used    • Tobacco comment: fomer smoker 2 ppd x 30 years-Still smoke 1 cig, sporadically   Substance and Sexual Activity   • Alcohol use: Yes     Frequency: Monthly or less     Drinks per session: 1 or 2     Binge frequency: Never     Comment: 1/2 drinks per year   • Drug use: Not Currently     Types: Marijuana     Comment: occasionally smoked marijuana   • Sexual activity: Defer   Social History Narrative    Caffeine: 1 cup coffee daily and some soda       Allergies   Allergen Reactions   • Penicillins Anaphylaxis   • Tikosyn [Dofetilide] Other (See Comments)     Torsades         Current Outpatient Medications:   •  albuterol sulfate  (90 Base) MCG/ACT inhaler, Inhale 2 puffs Every 4 (Four) Hours As Needed for Wheezing., Disp: 18 g, Rfl: 11  •  apixaban (ELIQUIS) 5 MG tablet tablet, Take 1 tablet by mouth Every 12 (Twelve) Hours. Indications: Atrial Fibrillation, Disp: 60 tablet, Rfl: 0  •  Fluticasone Furoate-Vilanterol (Breo Ellipta) 200-25 MCG/INH inhaler, Inhale 1 puff Daily., Disp: 60 each, Rfl: 6  •  guaiFENesin (MUCINEX) 600 MG 12 hr tablet, Take 1 tablet by mouth 2 (Two) Times a Day. (Patient taking differently: Take 600 mg by mouth Daily.), Disp: 60 tablet, Rfl: 6  •  ipratropium-albuterol (DUO-NEB) 0.5-2.5 mg/3 ml nebulizer, Take 3 mL by nebulization 4 (Four) Times a Day As Needed for Wheezing., Disp: 360 mL, Rfl: 3  •  montelukast (SINGULAIR) 10 MG tablet, Take 1 tablet by mouth Every Night. (Patient taking differently: Take 10 mg by mouth As Needed.), Disp: 30 tablet, Rfl: 11  •  omeprazole (priLOSEC) 40 MG capsule, Take 1 capsule by mouth Daily. (Patient taking differently: Take 40 mg by mouth As Needed.), Disp: 30 capsule, Rfl: 11  •  tiotropium (Spiriva HandiHaler) 18 MCG per inhalation capsule, Place 1 capsule into inhaler and inhale Daily., Disp: 90 capsule, Rfl: 3  •  apixaban (ELIQUIS) 5 MG tablet tablet, Take 1 tablet by mouth 2 (Two) Times a Day., Disp: 60 tablet, Rfl: 3  •  triamcinolone  (KENALOG) 0.1 % cream, APPLY SPARINGLY AND MASSAGE IN TWICE DAILY, Disp: 45 g, Rfl: 0    Current Facility-Administered Medications:   •  albuterol (PROVENTIL HFA;VENTOLIN HFA) inhaler 2 puff, 2 puff, Inhalation, PRN, Jocy, Samreen SOSA, APRN    The following portions of the patient's history were reviewed today and updated as appropriate: allergies, current medications, past family history, past medical history, past social history, past surgical history and problem list     Review of Systems   Constitution: Positive for malaise/fatigue. Negative for chills, decreased appetite, diaphoresis, fever, night sweats, weight gain and weight loss.   HENT: Negative for congestion, hearing loss, hoarse voice and nosebleeds.    Eyes: Negative for blurred vision, visual disturbance and visual halos.   Cardiovascular: Positive for dyspnea on exertion. Negative for chest pain, claudication, cyanosis, irregular heartbeat, leg swelling, near-syncope, orthopnea, palpitations, paroxysmal nocturnal dyspnea and syncope.   Respiratory: Negative for cough, hemoptysis, shortness of breath, sleep disturbances due to breathing, snoring, sputum production and wheezing.    Hematologic/Lymphatic: Negative for bleeding problem. Does not bruise/bleed easily.   Skin: Negative for dry skin, itching and rash.   Musculoskeletal: Negative for arthritis, falls, joint pain, joint swelling and myalgias.   Gastrointestinal: Negative for bloating, abdominal pain, constipation, diarrhea, flatus, heartburn, hematemesis, hematochezia, melena, nausea and vomiting.   Genitourinary: Negative for dysuria, frequency, hematuria, nocturia and urgency.   Neurological: Negative for excessive daytime sleepiness, dizziness, headaches, light-headedness, loss of balance and weakness.   Psychiatric/Behavioral: Negative for depression. The patient does not have insomnia and is not nervous/anxious.        Objective:     Vitals:    11/02/20 0916   BP: 101/57   BP Location:  "Left arm   Patient Position: Sitting   Cuff Size: Adult   Pulse: 70   Temp: 98.4 °F (36.9 °C)   TempSrc: Temporal   SpO2: 99%   Weight: 66.3 kg (146 lb 1 oz)   Height: 165.1 cm (65\")       Body mass index is 24.31 kg/m².    Vitals signs and nursing note reviewed.   Constitutional:       General: Not in acute distress.     Appearance: Well-developed.   Eyes:      Conjunctiva/sclera: Conjunctivae normal.      Pupils: Pupils are equal, round, and reactive to light.   HENT:      Head: Normocephalic and atraumatic.   Neck:      Musculoskeletal: Normal range of motion and neck supple.      Thyroid: No thyromegaly.      Vascular: No JVD.      Trachea: No tracheal deviation.   Pulmonary:      Effort: Pulmonary effort is normal.      Breath sounds: Normal breath sounds.   Cardiovascular:      PMI at left midclavicular line. Normal rate. Regular rhythm. Normal S1. Normal S2.      Murmurs: There is no murmur.      No gallop. No click. No rub.   Pulses:     Intact distal pulses.   Edema:     Peripheral edema absent.   Abdominal:      General: Bowel sounds are normal. There is no distension.      Palpations: Abdomen is soft.      Tenderness: There is no abdominal tenderness.   Musculoskeletal: Normal range of motion.   Skin:     General: Skin is warm and dry.   Neurological:      Mental Status: Alert and oriented to person, place, and time.   Psychiatric:         Behavior: Behavior normal. Behavior is cooperative.         Lab and Diagnostic Review:  Results for orders placed or performed during the hospital encounter of 09/21/20   Blood Culture - Blood, Hand, Left    Specimen: Hand, Left; Blood   Result Value Ref Range    Blood Culture No growth at 5 days    Blood Culture - Blood, Arm, Right    Specimen: Arm, Right; Blood   Result Value Ref Range    Blood Culture No growth at 5 days    Respiratory Panel PCR w/COVID-19(SARS-CoV-2) [facility]/JAMILA/HEENA/[facility] In-House, NP Swab in UTM/VTM, 3-4 HR TAT - Swab, Nasopharynx    Specimen: " Nasopharynx; Swab   Result Value Ref Range    ADENOVIRUS, PCR Not Detected Not Detected    Coronavirus 229E Not Detected Not Detected    Coronavirus HKU1 Not Detected Not Detected    Coronavirus NL63 Not Detected Not Detected    Coronavirus OC43 Not Detected Not Detected    COVID19 Not Detected Not Detected - Ref. Range    Human Metapneumovirus Not Detected Not Detected    Human Rhinovirus/Enterovirus Not Detected Not Detected    Influenza A PCR Not Detected Not Detected    Influenza A H1 Not Detected Not Detected    Influenza A H1 2009 PCR Not Detected Not Detected    Influenza A H3 Not Detected Not Detected    Influenza B PCR Not Detected Not Detected    Parainfluenza Virus 1 Not Detected Not Detected    Parainfluenza Virus 2 Not Detected Not Detected    Parainfluenza Virus 3 Not Detected Not Detected    Parainfluenza Virus 4 Not Detected Not Detected    RSV, PCR Not Detected Not Detected    Bordetella pertussis pcr Not Detected Not Detected    Bordetella parapertussis PCR Not Detected Not Detected    Chlamydophila pneumoniae PCR Not Detected Not Detected    Mycoplasma pneumo by PCR Not Detected Not Detected   Comprehensive Metabolic Panel    Specimen: Blood   Result Value Ref Range    Glucose 326 (H) 65 - 99 mg/dL    BUN 17 8 - 23 mg/dL    Creatinine 0.91 0.57 - 1.00 mg/dL    Sodium 138 136 - 145 mmol/L    Potassium 4.8 3.5 - 5.2 mmol/L    Chloride 100 98 - 107 mmol/L    CO2 31.0 (H) 22.0 - 29.0 mmol/L    Calcium 8.6 8.6 - 10.5 mg/dL    Total Protein 6.3 6.0 - 8.5 g/dL    Albumin 4.10 3.50 - 5.20 g/dL    ALT (SGPT) 91 (H) 1 - 33 U/L    AST (SGOT) 41 (H) 1 - 32 U/L    Alkaline Phosphatase 80 39 - 117 U/L    Total Bilirubin 0.5 0.0 - 1.2 mg/dL    eGFR Non African Amer 63 >60 mL/min/1.73    Globulin 2.2 gm/dL    A/G Ratio 1.9 g/dL    BUN/Creatinine Ratio 18.7 7.0 - 25.0    Anion Gap 7.0 5.0 - 15.0 mmol/L   Lipase    Specimen: Blood   Result Value Ref Range    Lipase 28 13 - 60 U/L   BNP    Specimen: Blood   Result  Value Ref Range    proBNP 3,421.0 (H) 0.0 - 900.0 pg/mL   Troponin    Specimen: Blood   Result Value Ref Range    Troponin T <0.010 0.000 - 0.030 ng/mL   Lactic Acid, Plasma    Specimen: Blood   Result Value Ref Range    Lactate 1.7 0.5 - 2.0 mmol/L   Ethanol    Specimen: Blood   Result Value Ref Range    Ethanol <10 0 - 10 mg/dL   TSH    Specimen: Blood   Result Value Ref Range    TSH 5.430 (H) 0.270 - 4.200 uIU/mL   CBC Auto Differential    Specimen: Blood   Result Value Ref Range    WBC 12.05 (H) 3.40 - 10.80 10*3/mm3    RBC 4.53 3.77 - 5.28 10*6/mm3    Hemoglobin 13.7 12.0 - 15.9 g/dL    Hematocrit 45.9 34.0 - 46.6 %    .3 (H) 79.0 - 97.0 fL    MCH 30.2 26.6 - 33.0 pg    MCHC 29.8 (L) 31.5 - 35.7 g/dL    RDW 12.0 (L) 12.3 - 15.4 %    RDW-SD 45.0 37.0 - 54.0 fl    MPV 12.2 (H) 6.0 - 12.0 fL    Platelets 328 140 - 450 10*3/mm3    Neutrophil % 71.3 42.7 - 76.0 %    Lymphocyte % 19.8 19.6 - 45.3 %    Monocyte % 7.3 5.0 - 12.0 %    Eosinophil % 0.7 0.3 - 6.2 %    Basophil % 0.4 0.0 - 1.5 %    Immature Grans % 0.5 0.0 - 0.5 %    Neutrophils, Absolute 8.60 (H) 1.70 - 7.00 10*3/mm3    Lymphocytes, Absolute 2.38 0.70 - 3.10 10*3/mm3    Monocytes, Absolute 0.88 0.10 - 0.90 10*3/mm3    Eosinophils, Absolute 0.08 0.00 - 0.40 10*3/mm3    Basophils, Absolute 0.05 0.00 - 0.20 10*3/mm3    Immature Grans, Absolute 0.06 (H) 0.00 - 0.05 10*3/mm3    nRBC 0.0 0.0 - 0.2 /100 WBC   Urinalysis With Microscopic If Indicated (No Culture) - Urine, Clean Catch    Specimen: Urine, Clean Catch   Result Value Ref Range    Color, UA Dark Yellow (A) Yellow, Straw    Appearance, UA Cloudy (A) Clear    pH, UA <=5.0 5.0 - 8.0    Specific Gravity, UA 1.026 1.001 - 1.030    Glucose, UA Negative Negative    Ketones, UA Negative Negative    Bilirubin, UA Negative Negative    Blood, UA Negative Negative    Protein,  mg/dL (2+) (A) Negative    Leuk Esterase, UA Trace (A) Negative    Nitrite, UA Positive (A) Negative    Urobilinogen, UA 1.0  E.U./dL 0.2 - 1.0 E.U./dL   Urine Drug Screen - Urine, Clean Catch    Specimen: Urine, Clean Catch   Result Value Ref Range    THC, Screen, Urine Positive (A) Negative    Phencyclidine (PCP), Urine Negative Negative    Cocaine Screen, Urine Negative Negative    Methamphetamine, Ur Negative Negative    Opiate Screen Negative Negative    Amphetamine Screen, Urine Negative Negative    Benzodiazepine Screen, Urine Negative Negative    Tricyclic Antidepressants Screen Negative Negative    Methadone Screen, Urine Negative Negative    Barbiturates Screen, Urine Negative Negative    Oxycodone Screen, Urine Negative Negative    Propoxyphene Screen Negative Negative    Buprenorphine, Screen, Urine Negative Negative   Blood Gas, Arterial With Co-Ox    Specimen: Arterial Blood   Result Value Ref Range    Dante's Test N/A     pH, Arterial 7.221 (L) 7.350 - 7.450 pH units    pCO2, Arterial 64.4 (H) 35.0 - 45.0 mm Hg    pO2, Arterial 157.0 (H) 83.0 - 108.0 mm Hg    HCO3, Arterial 26.4 (H) 20.0 - 26.0 mmol/L    Base Excess, Arterial -2.6 (L) 0.0 - 2.0 mmol/L    Hemoglobin, Blood Gas 13.2 (L) 14 - 18 g/dL    Hematocrit, Blood Gas 40.4 %    Oxyhemoglobin 98.1 94 - 99 %    Methemoglobin 0.60 0.00 - 1.50 %    Carboxyhemoglobin 0.7 0 - 2 %    CO2 Content 28.4 22 - 33 mmol/L    Temperature 37.0 C    Barometric Pressure for Blood Gas      Modality BiPap     FIO2 50 %    Rate 0 Breaths/minute    PIP 0 cmH2O    IPAP 0     EPAP 0     Note      pH, Temp Corrected 7.221 pH Units    pCO2, Temperature Corrected 64.4 (H) 35 - 45 mm Hg    pO2, Temperature Corrected 157 (H) 83 - 108 mm Hg   Urinalysis, Microscopic Only - Urine, Clean Catch    Specimen: Urine, Clean Catch   Result Value Ref Range    RBC, UA 3-6 (A) None Seen, 0-2 /HPF    WBC, UA 6-12 (A) None Seen, 0-2 /HPF    Bacteria, UA 3+ (A) None Seen, Trace /HPF    Squamous Epithelial Cells, UA 3-6 (A) None Seen, 0-2 /HPF    Hyaline Casts, UA None Seen 0 - 6 /LPF    WBC Clumps, UA Small/1+  None Seen /HPF    Methodology Manual Light Microscopy    aPTT    Specimen: Blood   Result Value Ref Range    PTT 28.5 (L) 50.0 - 95.0 seconds   Heparin Anti-Xa    Specimen: Blood   Result Value Ref Range    Heparin Anti-Xa (UFH) 0.10 (L) 0.30 - 0.70 IU/ml   Protime-INR    Specimen: Blood   Result Value Ref Range    Protime 14.3 (H) 11.5 - 14.0 Seconds    INR 1.14 0.85 - 1.16   Phosphorus    Specimen: Blood   Result Value Ref Range    Phosphorus 6.7 (H) 2.5 - 4.5 mg/dL   Heparin Anti-Xa    Specimen: Blood   Result Value Ref Range    Heparin Anti-Xa (UFH) 0.16 (L) 0.30 - 0.70 IU/ml   Heparin Anti-Xa    Specimen: Blood   Result Value Ref Range    Heparin Anti-Xa (UFH) 0.20 (L) 0.30 - 0.70 IU/ml   Comprehensive Metabolic Panel    Specimen: Blood   Result Value Ref Range    Glucose 164 (H) 65 - 99 mg/dL    BUN 15 8 - 23 mg/dL    Creatinine 0.73 0.57 - 1.00 mg/dL    Sodium 138 136 - 145 mmol/L    Potassium 5.3 (H) 3.5 - 5.2 mmol/L    Chloride 101 98 - 107 mmol/L    CO2 28.0 22.0 - 29.0 mmol/L    Calcium 8.6 8.6 - 10.5 mg/dL    Total Protein 5.6 (L) 6.0 - 8.5 g/dL    Albumin 3.50 3.50 - 5.20 g/dL    ALT (SGPT) 68 (H) 1 - 33 U/L    AST (SGOT) 21 1 - 32 U/L    Alkaline Phosphatase 66 39 - 117 U/L    Total Bilirubin 0.5 0.0 - 1.2 mg/dL    eGFR Non African Amer 81 >60 mL/min/1.73    Globulin 2.1 gm/dL    A/G Ratio 1.7 g/dL    BUN/Creatinine Ratio 20.5 7.0 - 25.0    Anion Gap 9.0 5.0 - 15.0 mmol/L   Magnesium    Specimen: Blood   Result Value Ref Range    Magnesium 2.2 1.6 - 2.4 mg/dL   Phosphorus    Specimen: Blood   Result Value Ref Range    Phosphorus 3.4 2.5 - 4.5 mg/dL   CBC Auto Differential    Specimen: Blood   Result Value Ref Range    WBC 17.22 (H) 3.40 - 10.80 10*3/mm3    RBC 4.12 3.77 - 5.28 10*6/mm3    Hemoglobin 12.5 12.0 - 15.9 g/dL    Hematocrit 40.7 34.0 - 46.6 %    MCV 98.8 (H) 79.0 - 97.0 fL    MCH 30.3 26.6 - 33.0 pg    MCHC 30.7 (L) 31.5 - 35.7 g/dL    RDW 12.1 (L) 12.3 - 15.4 %    RDW-SD 43.8 37.0 - 54.0 fl     MPV 12.1 (H) 6.0 - 12.0 fL    Platelets 226 140 - 450 10*3/mm3    Neutrophil % 90.3 (H) 42.7 - 76.0 %    Lymphocyte % 5.8 (L) 19.6 - 45.3 %    Monocyte % 3.0 (L) 5.0 - 12.0 %    Eosinophil % 0.0 (L) 0.3 - 6.2 %    Basophil % 0.1 0.0 - 1.5 %    Immature Grans % 0.8 (H) 0.0 - 0.5 %    Neutrophils, Absolute 15.56 (H) 1.70 - 7.00 10*3/mm3    Lymphocytes, Absolute 1.00 0.70 - 3.10 10*3/mm3    Monocytes, Absolute 0.52 0.10 - 0.90 10*3/mm3    Eosinophils, Absolute 0.00 0.00 - 0.40 10*3/mm3    Basophils, Absolute 0.01 0.00 - 0.20 10*3/mm3    Immature Grans, Absolute 0.13 (H) 0.00 - 0.05 10*3/mm3    nRBC 0.0 0.0 - 0.2 /100 WBC   Heparin Anti-Xa    Specimen: Blood   Result Value Ref Range    Heparin Anti-Xa (UFH) 0.68 0.30 - 0.70 IU/ml   Hemoglobin A1c    Specimen: Blood   Result Value Ref Range    Hemoglobin A1C 6.30 (H) 4.80 - 5.60 %   Heparin Anti-Xa    Specimen: Blood   Result Value Ref Range    Heparin Anti-Xa (UFH) 0.52 0.30 - 0.70 IU/ml   Procalcitonin    Specimen: Blood   Result Value Ref Range    Procalcitonin 0.16 0.00 - 0.25 ng/mL   Heparin Anti-Xa    Specimen: Blood   Result Value Ref Range    Heparin Anti-Xa (UFH) 0.35 0.30 - 0.70 IU/ml   Basic Metabolic Panel    Specimen: Blood   Result Value Ref Range    Glucose 128 (H) 65 - 99 mg/dL    BUN 37 (H) 8 - 23 mg/dL    Creatinine 1.01 (H) 0.57 - 1.00 mg/dL    Sodium 138 136 - 145 mmol/L    Potassium 5.3 (H) 3.5 - 5.2 mmol/L    Chloride 99 98 - 107 mmol/L    CO2 27.0 22.0 - 29.0 mmol/L    Calcium 9.1 8.6 - 10.5 mg/dL    eGFR Non African Amer 56 (L) >60 mL/min/1.73    BUN/Creatinine Ratio 36.6 (H) 7.0 - 25.0    Anion Gap 12.0 5.0 - 15.0 mmol/L   Magnesium    Specimen: Blood   Result Value Ref Range    Magnesium 2.5 (H) 1.6 - 2.4 mg/dL   proBNP    Specimen: Blood   Result Value Ref Range    proBNP 4,745.0 (H) 0.0 - 900.0 pg/mL   Heparin Anti-Xa    Specimen: Blood   Result Value Ref Range    Heparin Anti-Xa (UFH) 0.32 0.30 - 0.70 IU/ml   Magnesium    Specimen: Blood    Result Value Ref Range    Magnesium 2.4 1.6 - 2.4 mg/dL   Heparin Anti-Xa    Specimen: Blood   Result Value Ref Range    Heparin Anti-Xa (UFH) 0.49 0.30 - 0.70 IU/ml   Basic Metabolic Panel    Specimen: Blood   Result Value Ref Range    Glucose 110 (H) 65 - 99 mg/dL    BUN 33 (H) 8 - 23 mg/dL    Creatinine 0.63 0.57 - 1.00 mg/dL    Sodium 136 136 - 145 mmol/L    Potassium 4.8 3.5 - 5.2 mmol/L    Chloride 101 98 - 107 mmol/L    CO2 29.0 22.0 - 29.0 mmol/L    Calcium 8.8 8.6 - 10.5 mg/dL    eGFR Non African Amer 96 >60 mL/min/1.73    BUN/Creatinine Ratio 52.4 (H) 7.0 - 25.0    Anion Gap 6.0 5.0 - 15.0 mmol/L   Magnesium    Specimen: Blood   Result Value Ref Range    Magnesium 2.4 1.6 - 2.4 mg/dL   Phosphorus    Specimen: Blood   Result Value Ref Range    Phosphorus 3.6 2.5 - 4.5 mg/dL   proBNP    Specimen: Blood   Result Value Ref Range    proBNP 3,371.0 (H) 0.0 - 900.0 pg/mL   Heparin Anti-Xa    Specimen: Blood   Result Value Ref Range    Heparin Anti-Xa (UFH) 0.32 0.30 - 0.70 IU/ml   CBC Auto Differential    Specimen: Blood   Result Value Ref Range    WBC 15.29 (H) 3.40 - 10.80 10*3/mm3    RBC 4.01 3.77 - 5.28 10*6/mm3    Hemoglobin 11.9 (L) 12.0 - 15.9 g/dL    Hematocrit 39.0 34.0 - 46.6 %    MCV 97.3 (H) 79.0 - 97.0 fL    MCH 29.7 26.6 - 33.0 pg    MCHC 30.5 (L) 31.5 - 35.7 g/dL    RDW 12.6 12.3 - 15.4 %    RDW-SD 44.9 37.0 - 54.0 fl    MPV 11.6 6.0 - 12.0 fL    Platelets 243 140 - 450 10*3/mm3    Neutrophil % 86.2 (H) 42.7 - 76.0 %    Lymphocyte % 5.2 (L) 19.6 - 45.3 %    Monocyte % 7.2 5.0 - 12.0 %    Eosinophil % 0.0 (L) 0.3 - 6.2 %    Basophil % 0.1 0.0 - 1.5 %    Immature Grans % 1.3 (H) 0.0 - 0.5 %    Neutrophils, Absolute 13.18 (H) 1.70 - 7.00 10*3/mm3    Lymphocytes, Absolute 0.80 0.70 - 3.10 10*3/mm3    Monocytes, Absolute 1.10 (H) 0.10 - 0.90 10*3/mm3    Eosinophils, Absolute 0.00 0.00 - 0.40 10*3/mm3    Basophils, Absolute 0.01 0.00 - 0.20 10*3/mm3    Immature Grans, Absolute 0.20 (H) 0.00 - 0.05  10*3/mm3    nRBC 0.0 0.0 - 0.2 /100 WBC   Magnesium    Specimen: Blood   Result Value Ref Range    Magnesium 3.8 (H) 1.6 - 2.4 mg/dL   Basic Metabolic Panel    Specimen: Blood   Result Value Ref Range    Glucose 177 (H) 65 - 99 mg/dL    BUN 22 8 - 23 mg/dL    Creatinine 0.54 (L) 0.57 - 1.00 mg/dL    Sodium 137 136 - 145 mmol/L    Potassium 4.6 3.5 - 5.2 mmol/L    Chloride 100 98 - 107 mmol/L    CO2 29.0 22.0 - 29.0 mmol/L    Calcium 8.4 (L) 8.6 - 10.5 mg/dL    eGFR Non African Amer 114 >60 mL/min/1.73    BUN/Creatinine Ratio 40.7 (H) 7.0 - 25.0    Anion Gap 8.0 5.0 - 15.0 mmol/L   Magnesium    Specimen: Blood   Result Value Ref Range    Magnesium 2.6 (H) 1.6 - 2.4 mg/dL   Basic Metabolic Panel    Specimen: Blood   Result Value Ref Range    Glucose 69 65 - 99 mg/dL    BUN 19 8 - 23 mg/dL    Creatinine 0.55 (L) 0.57 - 1.00 mg/dL    Sodium 140 136 - 145 mmol/L    Potassium 4.3 3.5 - 5.2 mmol/L    Chloride 101 98 - 107 mmol/L    CO2 34.0 (H) 22.0 - 29.0 mmol/L    Calcium 8.7 8.6 - 10.5 mg/dL    eGFR Non African Amer 112 >60 mL/min/1.73    BUN/Creatinine Ratio 34.5 (H) 7.0 - 25.0    Anion Gap 5.0 5.0 - 15.0 mmol/L   CBC Auto Differential    Specimen: Blood   Result Value Ref Range    WBC 11.51 (H) 3.40 - 10.80 10*3/mm3    RBC 4.02 3.77 - 5.28 10*6/mm3    Hemoglobin 12.0 12.0 - 15.9 g/dL    Hematocrit 38.9 34.0 - 46.6 %    MCV 96.8 79.0 - 97.0 fL    MCH 29.9 26.6 - 33.0 pg    MCHC 30.8 (L) 31.5 - 35.7 g/dL    RDW 12.4 12.3 - 15.4 %    RDW-SD 44.1 37.0 - 54.0 fl    MPV 11.3 6.0 - 12.0 fL    Platelets 210 140 - 450 10*3/mm3    Neutrophil % 81.8 (H) 42.7 - 76.0 %    Lymphocyte % 8.9 (L) 19.6 - 45.3 %    Monocyte % 8.6 5.0 - 12.0 %    Eosinophil % 0.2 (L) 0.3 - 6.2 %    Basophil % 0.1 0.0 - 1.5 %    Immature Grans % 0.4 0.0 - 0.5 %    Neutrophils, Absolute 9.42 (H) 1.70 - 7.00 10*3/mm3    Lymphocytes, Absolute 1.02 0.70 - 3.10 10*3/mm3    Monocytes, Absolute 0.99 (H) 0.10 - 0.90 10*3/mm3    Eosinophils, Absolute 0.02 0.00 -  0.40 10*3/mm3    Basophils, Absolute 0.01 0.00 - 0.20 10*3/mm3    Immature Grans, Absolute 0.05 0.00 - 0.05 10*3/mm3    nRBC 0.0 0.0 - 0.2 /100 WBC   Magnesium    Specimen: Blood   Result Value Ref Range    Magnesium 2.1 1.6 - 2.4 mg/dL   Basic Metabolic Panel    Specimen: Blood   Result Value Ref Range    Glucose 76 65 - 99 mg/dL    BUN 16 8 - 23 mg/dL    Creatinine 0.57 0.57 - 1.00 mg/dL    Sodium 138 136 - 145 mmol/L    Potassium 4.2 3.5 - 5.2 mmol/L    Chloride 100 98 - 107 mmol/L    CO2 32.0 (H) 22.0 - 29.0 mmol/L    Calcium 8.7 8.6 - 10.5 mg/dL    eGFR Non African Amer 107 >60 mL/min/1.73    BUN/Creatinine Ratio 28.1 (H) 7.0 - 25.0    Anion Gap 6.0 5.0 - 15.0 mmol/L   Magnesium    Specimen: Blood   Result Value Ref Range    Magnesium 2.1 1.6 - 2.4 mg/dL   Basic Metabolic Panel    Specimen: Blood   Result Value Ref Range    Glucose 100 (H) 65 - 99 mg/dL    BUN 14 8 - 23 mg/dL    Creatinine 0.58 0.57 - 1.00 mg/dL    Sodium 141 136 - 145 mmol/L    Potassium 4.4 3.5 - 5.2 mmol/L    Chloride 101 98 - 107 mmol/L    CO2 32.0 (H) 22.0 - 29.0 mmol/L    Calcium 8.4 (L) 8.6 - 10.5 mg/dL    eGFR Non African Amer 105 >60 mL/min/1.73    BUN/Creatinine Ratio 24.1 7.0 - 25.0    Anion Gap 8.0 5.0 - 15.0 mmol/L   Magnesium    Specimen: Blood   Result Value Ref Range    Magnesium 2.2 1.6 - 2.4 mg/dL   Basic Metabolic Panel    Specimen: Blood   Result Value Ref Range    Glucose 125 (H) 65 - 99 mg/dL    BUN 18 8 - 23 mg/dL    Creatinine 0.59 0.57 - 1.00 mg/dL    Sodium 139 136 - 145 mmol/L    Potassium 5.0 3.5 - 5.2 mmol/L    Chloride 98 98 - 107 mmol/L    CO2 34.0 (H) 22.0 - 29.0 mmol/L    Calcium 9.1 8.6 - 10.5 mg/dL    eGFR Non African Amer 103 >60 mL/min/1.73    BUN/Creatinine Ratio 30.5 (H) 7.0 - 25.0    Anion Gap 7.0 5.0 - 15.0 mmol/L   CBC Auto Differential    Specimen: Blood   Result Value Ref Range    WBC 11.74 (H) 3.40 - 10.80 10*3/mm3    RBC 4.79 3.77 - 5.28 10*6/mm3    Hemoglobin 14.5 12.0 - 15.9 g/dL    Hematocrit  46.4 34.0 - 46.6 %    MCV 96.9 79.0 - 97.0 fL    MCH 30.3 26.6 - 33.0 pg    MCHC 31.3 (L) 31.5 - 35.7 g/dL    RDW 12.3 12.3 - 15.4 %    RDW-SD 43.5 37.0 - 54.0 fl    MPV 10.8 6.0 - 12.0 fL    Platelets 278 140 - 450 10*3/mm3    Neutrophil % 71.7 42.7 - 76.0 %    Lymphocyte % 16.4 (L) 19.6 - 45.3 %    Monocyte % 9.4 5.0 - 12.0 %    Eosinophil % 1.8 0.3 - 6.2 %    Basophil % 0.3 0.0 - 1.5 %    Immature Grans % 0.4 0.0 - 0.5 %    Neutrophils, Absolute 8.41 (H) 1.70 - 7.00 10*3/mm3    Lymphocytes, Absolute 1.93 0.70 - 3.10 10*3/mm3    Monocytes, Absolute 1.10 (H) 0.10 - 0.90 10*3/mm3    Eosinophils, Absolute 0.21 0.00 - 0.40 10*3/mm3    Basophils, Absolute 0.04 0.00 - 0.20 10*3/mm3    Immature Grans, Absolute 0.05 0.00 - 0.05 10*3/mm3    nRBC 0.0 0.0 - 0.2 /100 WBC   Magnesium    Specimen: Blood   Result Value Ref Range    Magnesium 2.2 1.6 - 2.4 mg/dL   Basic Metabolic Panel    Specimen: Blood   Result Value Ref Range    Glucose 116 (H) 65 - 99 mg/dL    BUN 16 8 - 23 mg/dL    Creatinine 0.60 0.57 - 1.00 mg/dL    Sodium 139 136 - 145 mmol/L    Potassium 4.6 3.5 - 5.2 mmol/L    Chloride 101 98 - 107 mmol/L    CO2 30.0 (H) 22.0 - 29.0 mmol/L    Calcium 9.0 8.6 - 10.5 mg/dL    eGFR Non African Amer 101 >60 mL/min/1.73    BUN/Creatinine Ratio 26.7 (H) 7.0 - 25.0    Anion Gap 8.0 5.0 - 15.0 mmol/L   Phosphorus    Specimen: Blood   Result Value Ref Range    Phosphorus 3.8 2.5 - 4.5 mg/dL   CBC Auto Differential    Specimen: Blood   Result Value Ref Range    WBC 10.76 3.40 - 10.80 10*3/mm3    RBC 4.66 3.77 - 5.28 10*6/mm3    Hemoglobin 14.0 12.0 - 15.9 g/dL    Hematocrit 44.8 34.0 - 46.6 %    MCV 96.1 79.0 - 97.0 fL    MCH 30.0 26.6 - 33.0 pg    MCHC 31.3 (L) 31.5 - 35.7 g/dL    RDW 12.4 12.3 - 15.4 %    RDW-SD 43.8 37.0 - 54.0 fl    MPV 10.6 6.0 - 12.0 fL    Platelets 271 140 - 450 10*3/mm3    Neutrophil % 70.6 42.7 - 76.0 %    Lymphocyte % 17.6 (L) 19.6 - 45.3 %    Monocyte % 9.5 5.0 - 12.0 %    Eosinophil % 1.3 0.3 - 6.2 %     Basophil % 0.3 0.0 - 1.5 %    Immature Grans % 0.7 (H) 0.0 - 0.5 %    Neutrophils, Absolute 7.61 (H) 1.70 - 7.00 10*3/mm3    Lymphocytes, Absolute 1.89 0.70 - 3.10 10*3/mm3    Monocytes, Absolute 1.02 (H) 0.10 - 0.90 10*3/mm3    Eosinophils, Absolute 0.14 0.00 - 0.40 10*3/mm3    Basophils, Absolute 0.03 0.00 - 0.20 10*3/mm3    Immature Grans, Absolute 0.07 (H) 0.00 - 0.05 10*3/mm3    nRBC 0.0 0.0 - 0.2 /100 WBC   Magnesium    Specimen: Blood   Result Value Ref Range    Magnesium 2.1 1.6 - 2.4 mg/dL   Basic Metabolic Panel    Specimen: Blood   Result Value Ref Range    Glucose 98 65 - 99 mg/dL    BUN 16 8 - 23 mg/dL    Creatinine 0.69 0.57 - 1.00 mg/dL    Sodium 140 136 - 145 mmol/L    Potassium 4.4 3.5 - 5.2 mmol/L    Chloride 103 98 - 107 mmol/L    CO2 31.0 (H) 22.0 - 29.0 mmol/L    Calcium 8.8 8.6 - 10.5 mg/dL    eGFR Non African Amer 86 >60 mL/min/1.73    BUN/Creatinine Ratio 23.2 7.0 - 25.0    Anion Gap 6.0 5.0 - 15.0 mmol/L   Magnesium    Specimen: Blood   Result Value Ref Range    Magnesium 2.4 1.6 - 2.4 mg/dL   Basic Metabolic Panel    Specimen: Blood   Result Value Ref Range    Glucose 90 65 - 99 mg/dL    BUN 16 8 - 23 mg/dL    Creatinine 0.58 0.57 - 1.00 mg/dL    Sodium 137 136 - 145 mmol/L    Potassium 4.3 3.5 - 5.2 mmol/L    Chloride 101 98 - 107 mmol/L    CO2 28.0 22.0 - 29.0 mmol/L    Calcium 8.7 8.6 - 10.5 mg/dL    eGFR Non African Amer 105 >60 mL/min/1.73    BUN/Creatinine Ratio 27.6 (H) 7.0 - 25.0    Anion Gap 8.0 5.0 - 15.0 mmol/L   POC Creatinine    Specimen: Blood   Result Value Ref Range    Creatinine 0.90 0.60 - 1.30 mg/dL   POC Surgery Labs    Specimen: Blood   Result Value Ref Range    Ionized Calcium 1.25 1.20 - 1.32 mmol/L    POC Potassium 4.7 3.5 - 4.9 mmol/L    Sodium 139 138 - 146 mmol/L    Total CO2 36 (H) 24 - 29 mmol/L    Hemoglobin 15.3 12.0 - 17.0 g/dL    Hematocrit 45 38 - 51 %    pCO2, Arterial 102.7 (H) 35 - 45 mm Hg    pO2, Arterial 33 (L) 80 - 105 mmHg    Base Excess 3.0000  -5 - 5 mmol/L    O2 Saturation, Arterial 41 (L) 95 - 98 %    pH, Arterial 7.11 (L) 7.35 - 7.6 pH units    HCO3, Arterial 32.8 (H) 22 - 26 mmol/L    Glucose 311 (H) 70 - 130 mg/dL   POC Glucose Once    Specimen: Blood   Result Value Ref Range    Glucose 179 (H) 70 - 130 mg/dL   POC Glucose Once    Specimen: Blood   Result Value Ref Range    Glucose 186 (H) 70 - 130 mg/dL   POC Glucose Once    Specimen: Blood   Result Value Ref Range    Glucose 205 (H) 70 - 130 mg/dL   POC Glucose Once    Specimen: Blood   Result Value Ref Range    Glucose 156 (H) 70 - 130 mg/dL   POC Glucose Once    Specimen: Blood   Result Value Ref Range    Glucose 170 (H) 70 - 130 mg/dL   POC Glucose Once    Specimen: Blood   Result Value Ref Range    Glucose 198 (H) 70 - 130 mg/dL   POC Glucose Once    Specimen: Blood   Result Value Ref Range    Glucose 228 (H) 70 - 130 mg/dL   POC Glucose Once    Specimen: Blood   Result Value Ref Range    Glucose 140 (H) 70 - 130 mg/dL   POC Glucose Once    Specimen: Blood   Result Value Ref Range    Glucose 126 70 - 130 mg/dL   POC Glucose Once    Specimen: Blood   Result Value Ref Range    Glucose 148 (H) 70 - 130 mg/dL   POC Glucose Once    Specimen: Blood   Result Value Ref Range    Glucose 266 (H) 70 - 130 mg/dL   POC Glucose Once    Specimen: Blood   Result Value Ref Range    Glucose 107 70 - 130 mg/dL   POC Glucose Once    Specimen: Blood   Result Value Ref Range    Glucose 139 (H) 70 - 130 mg/dL   POC Glucose Once    Specimen: Blood   Result Value Ref Range    Glucose 210 (H) 70 - 130 mg/dL   POC Glucose Once    Specimen: Blood   Result Value Ref Range    Glucose 169 (H) 70 - 130 mg/dL   POC Glucose Once    Specimen: Blood   Result Value Ref Range    Glucose 78 70 - 130 mg/dL   POC Glucose Once    Specimen: Blood   Result Value Ref Range    Glucose 99 70 - 130 mg/dL   POC Glucose Once    Specimen: Blood   Result Value Ref Range    Glucose 157 (H) 70 - 130 mg/dL   POC Glucose Once    Specimen: Blood    Result Value Ref Range    Glucose 217 (H) 70 - 130 mg/dL   POC Glucose Once    Specimen: Blood   Result Value Ref Range    Glucose 75 70 - 130 mg/dL   POC Glucose Once    Specimen: Blood   Result Value Ref Range    Glucose 92 70 - 130 mg/dL   POC Glucose Once    Specimen: Blood   Result Value Ref Range    Glucose 133 (H) 70 - 130 mg/dL   POC Glucose Once    Specimen: Blood   Result Value Ref Range    Glucose 180 (H) 70 - 130 mg/dL   POC Glucose Once    Specimen: Blood   Result Value Ref Range    Glucose 128 70 - 130 mg/dL   POC Glucose Once    Specimen: Blood   Result Value Ref Range    Glucose 141 (H) 70 - 130 mg/dL   POC Glucose Once    Specimen: Blood   Result Value Ref Range    Glucose 96 70 - 130 mg/dL   POC Glucose Once    Specimen: Blood   Result Value Ref Range    Glucose 138 (H) 70 - 130 mg/dL   POC Glucose Once    Specimen: Blood   Result Value Ref Range    Glucose 125 70 - 130 mg/dL   POC Glucose Once    Specimen: Blood   Result Value Ref Range    Glucose 158 (H) 70 - 130 mg/dL   POC Glucose Once    Specimen: Blood   Result Value Ref Range    Glucose 87 70 - 130 mg/dL   POC Glucose Once    Specimen: Blood   Result Value Ref Range    Glucose 137 (H) 70 - 130 mg/dL   POC Glucose Once    Specimen: Blood   Result Value Ref Range    Glucose 108 70 - 130 mg/dL   POC Glucose Once    Specimen: Blood   Result Value Ref Range    Glucose 115 70 - 130 mg/dL   POC Glucose Once    Specimen: Blood   Result Value Ref Range    Glucose 109 70 - 130 mg/dL   POC Glucose Once    Specimen: Blood   Result Value Ref Range    Glucose 187 (H) 70 - 130 mg/dL   POC Glucose Once    Specimen: Blood   Result Value Ref Range    Glucose 114 70 - 130 mg/dL   POC Glucose Once    Specimen: Blood   Result Value Ref Range    Glucose 102 70 - 130 mg/dL   POC Glucose Once    Specimen: Blood   Result Value Ref Range    Glucose 130 70 - 130 mg/dL   POC Glucose Once    Specimen: Blood   Result Value Ref Range    Glucose 251 (H) 70 - 130 mg/dL    POC Glucose Once    Specimen: Blood   Result Value Ref Range    Glucose 164 (H) 70 - 130 mg/dL   POC Glucose Once    Specimen: Blood   Result Value Ref Range    Glucose 230 (H) 70 - 130 mg/dL   Adult Transthoracic Echo Complete W/ Cont if Necessary Per Protocol   Result Value Ref Range    BH CV VAS BP RIGHT ARM 93/64 mmHg    BSA 1.8 m^2    IVSd 1.1 cm    LVIDd 4.3 cm    LVIDs 3.3 cm    LVPWd 1.1 cm    IVS/LVPW 0.95     FS 22.5 %    EDV(Teich) 83.1 ml    ESV(Teich) 45.2 ml    EF(Teich) 45.6 %    EDV(cubed) 79.6 ml    ESV(cubed) 37.0 ml    EF(cubed) 53.4 %    LV mass(C)d 160.2 grams    LV mass(C)dI 91.3 grams/m^2    SV(Teich) 37.9 ml    SI(Teich) 21.6 ml/m^2    SV(cubed) 42.5 ml    SI(cubed) 24.2 ml/m^2    MV Diam 3.0 cm    Ao root diam 3.0 cm    Ao root area 7.0 cm^2    LA dimension 4.1 cm    LA/Ao 1.4     LVOT diam 2.0 cm    LVOT area 3.1 cm^2    LVOT area(traced) 3.1 cm^2    LAd major 5.4 cm    LVLd ap4 7.0 cm    EDV(MOD-sp4) 78.0 ml    LVLs ap4 7.3 cm    ESV(MOD-sp4) 53.0 ml    EF(MOD-sp4) 32.1 %    LVLd ap2 7.4 cm    EDV(MOD-sp2) 65.0 ml    LVLs ap2 6.7 cm    ESV(MOD-sp2) 41.0 ml    EF(MOD-sp2) 36.9 %    LA volume 72.0 ml    SV(MOD-sp4) 25.0 ml    SI(MOD-sp4) 14.2 ml/m^2    SV(MOD-sp2) 24.0 ml    SI(MOD-sp2) 13.7 ml/m^2    Ao root area (BSA corrected) 1.7     LV Diamond Vol (BSA corrected) 44.4 ml/m^2    LV Sys Vol (BSA corrected) 30.2 ml/m^2    LA Volume Index 41.0 ml/m^2    MV E max francis 74.6 cm/sec    MV V2 max 106.7 cm/sec    MV max PG 4.6 mmHg    MV V2 mean 78.9 cm/sec    MV mean PG 2.7 mmHg    MV V2 VTI 18.6 cm    MV area (1 diam) 7.2 cm^2    MVA(VTI) 1.5 cm^2    MV Flow area(1diam) 7.2 cm^2    MV dec time 0.17 sec    Ao pk francis 123.1 cm/sec    Ao max PG 6.1 mmHg    Ao max PG (full) 4.5 mmHg    AMINTA(V,A) 1.6 cm^2    AMINTA(V,D) 1.6 cm^2    LV V1 max PG 1.6 mmHg    LV V1 mean PG 0.92 mmHg    LV V1 max 63.2 cm/sec    LV V1 mean 44.0 cm/sec    LV V1 VTI 8.8 cm    MR max francis 409.6 cm/sec    MR max PG 68.0 mmHg    MR  mean molina 323.5 cm/sec    MR mean PG 45.6 mmHg    MR .5 cm    MR PISA 2.0 cm^2    MR flow rate 61.3 cm^3/sec    MR ERO 0.15 cm^2    MR volume 15.3 ml    MR PISA radius 0.56 cm    MR alias molina 30.8 cm/sec    SV(MV 1 diam) 134.1 ml    SI(MV 1 diam) 76.4 ml/m^2    SV(LVOT) 27.4 ml    SI(LVOT) 15.6 ml/m^2    TR max molina 286 cm/sec    TR max PG 33 mmHg    RF(MV,LVOT)(1diam) 0.8     Lat E/e'  7.4     Med E/e' 7.4     Lat Peak E' Molina 10.0 cm/sec    Med Peak E' Molina 10.0 cm/sec     CV ECHO HOLLY - BZI_BMI 25.1 kilograms/m^2     CV ECHO HOLLY - BSA(GageIn) 1.8 m^2     CV ECHO HOLLY - BZI_METRIC_WEIGHT 68.5 kg     CV ECHO HOLLY - BZI_METRIC_HEIGHT 165.1 cm    Avg E/e' ratio 7.46     RV Base 3.30 cm    RV Length 6.60 cm    RV Mid 2.90 cm    MV regurgitant fraction 14 %    MV vena contracta 0.65 cm    PISA ALIASING MOLINA 3.10 m/s    Radius 0.6 cm    RAP systole 15 mmHg    RVSP(TR) 48 mmHg   Adult Transesophageal Echo (IRMA) W/ Cont if Necessary Per Protocol   Result Value Ref Range    BSA 1.7 m^2     CV ECHO HOLLY - BZI_BMI 25.9 kilograms/m^2     CV ECHO HOLLY - BSA(GageIn) 1.8 m^2     CV ECHO HOLLY - BZI_METRIC_WEIGHT 68.5 kg     CV ECHO HOLLY - BZI_METRIC_HEIGHT 162.6 cm     CV VAS BP LEFT ARM 92/69 mmHg    Echo EF Estimated 45 %         Assessment and Plan:   1. Chronic systolic heart failure (CMS/HCC)  euvolemic  Heart failure education today including signs and symptoms, the role of the heart failure center, daily weights, low sodium diet (less than 1500 mg per day), and daily physical activity. Reviewed HF Zones with patient and family.  Patient to continue current medications as previously ordered.     2. Persistent atrial fibrillation (CMS/HCC)  S/p avn node/PPM implant per Dr Solomon    3. Centrilobular emphysema (CMS/HCC)  Oxygen at night. Did encourage patient to use humidification on concentrator.      F/u 5-6 weeks per patient request.  It has been a pleasure to participate in the care of this patient.   Patient was instructed to call the [facility] with any questions, concerns, or worsening symptoms.    *Please note that portions of this note were completed with a voice recognition program. Efforts were made to edit the dictations, but occasionally words are mistranscribed.

## 2020-12-07 ENCOUNTER — OFFICE VISIT (OUTPATIENT)
Dept: CARDIOLOGY | Facility: HOSPITAL | Age: 60
End: 2020-12-07

## 2020-12-07 VITALS
SYSTOLIC BLOOD PRESSURE: 113 MMHG | BODY MASS INDEX: 25.18 KG/M2 | DIASTOLIC BLOOD PRESSURE: 63 MMHG | TEMPERATURE: 97.8 F | HEIGHT: 65 IN | HEART RATE: 86 BPM | OXYGEN SATURATION: 94 % | WEIGHT: 151.13 LBS | RESPIRATION RATE: 20 BRPM

## 2020-12-07 DIAGNOSIS — J43.2 CENTRILOBULAR EMPHYSEMA (HCC): ICD-10-CM

## 2020-12-07 DIAGNOSIS — E78.5 DYSLIPIDEMIA: ICD-10-CM

## 2020-12-07 DIAGNOSIS — I50.22 CHRONIC SYSTOLIC HEART FAILURE (HCC): Primary | ICD-10-CM

## 2020-12-07 DIAGNOSIS — I48.19 PERSISTENT ATRIAL FIBRILLATION (HCC): ICD-10-CM

## 2020-12-07 PROCEDURE — 99214 OFFICE O/P EST MOD 30 MIN: CPT | Performed by: NURSE PRACTITIONER

## 2020-12-07 NOTE — PROGRESS NOTES
[facility]  [facility]    12/07/2020         Nolvia Huff  [address] [facility] [id number]  [unfilled]    1960    Alfonso Denney MD    Nolvia Huff is a 60 y.o. female.      Subjective:     Chief Complaint:  Follow-up and Congestive Heart Failure           HPI 60-year-old female presents the office today for continuation of her chronic systolic heart failure and atrial fibrillation.  Patient reports she is doing well overall.  She notes her dyspnea on exertion is at baseline.  History of COPD.  She reports that she is busy raising her grandchildren ages 14, 12 and her great nephew age 4 months.  She reports that she tires easily if she overdoes it but notes overall she is feeling significantly better. Denies chest pain, dizziness, presyncope, syncope, orthopnea, pnd, pedal edema. notes occasional palpitations      Patient Active Problem List   Diagnosis   • Tobacco abuse   • Anxiety disorder   • Dyslipidemia   • Emphysema/COPD (CMS/HCC)   • GERD    • Osteoporosis   • Personal history of tobacco use, presenting hazards to health   • COPD with acute exacerbation   • Nocturnal hypoxia on 2L NC    • Pulmonary emphysema (CMS/HCC)   • A/C Respiratory Failure Type 2   • AF w/RVR    • Former smoker   • Atrial fibrillation with rapid ventricular response (CMS/HCC)   • Acute systolic CHF (congestive heart failure) (CMS/HCC)       Past Medical History:   Diagnosis Date   • Acute otitis media     H/o   • H/O chest x-ray 10/04/2011    Cardiac silhouute normal limits. The pulmonary vasculature appears normal, The diaphragms are flattened bilaterally which is sug of COPD. Lungs are grossly clear bilaterally with no prominent nodules or masses seen. No change when compared to previous exam from 11/10/09   • H/O chest x-ray 05/31/2011    Chronic change. No active disease   • H/O chest x-ray 11/10/2009    Moderate degenerative changes of thoracic spine. Cardiac silhoutte appears  normal. There is slight hyperexpansion. There were a few scattered calcified granulomas. No acute appearing infiltrates noted. no real change compared to 10/20/08   • History of PFTs 2015    Severe OAD w/ dec in FEV from prior. no resp to BDRX. MVV reduced. FVC reduced; sugg restricitve pattern   • History of PFTs 10/21/2013    Mod severe OAD, FVC normal, no restriction, MVV reduced   • History of PFTs 2012    Severe OAD, FVC reduced, MVV dec   • Normal stillborn     Pt had stillborn infant       Past Surgical History:   Procedure Laterality Date   • BREAST CYST ASPIRATION Right    • CARDIAC ELECTROPHYSIOLOGY PROCEDURE N/A 2020    Procedure: MICRA PACEMAKER IMPLANTATION + AVN;  Surgeon: Maximus Solomon DO;  Location: [facility] INVASIVE LOCATION;  Service: Cardiology;  Laterality: N/A;   • HYSTERECTOMY     • TONSILLECTOMY         Family History   Problem Relation Age of Onset   • Emphysema Mother    • Cancer Mother         laryngeal carcinoma   • Heart murmur Father    • Cerebral palsy Sister    • Breast cancer Sister    • No Known Problems Son    • Cancer Maternal Grandfather         laryngeal carcinoma.   • Asthma Brother    • Heart murmur Brother    • Heart disease Maternal Grandmother    • No Known Problems Paternal Grandmother    • No Known Problems Paternal Grandfather    • Other Son          at birth       Social History     Socioeconomic History   • Marital status:      Spouse name: Not on file   • Number of children: Not on file   • Years of education: Not on file   • Highest education level: Not on file   Tobacco Use   • Smoking status: Former Smoker     Packs/day: 2.00     Years: 30.00     Pack years: 60.00     Types: Cigarettes     Quit date:      Years since quittin.9   • Smokeless tobacco: Never Used   • Tobacco comment: fomer smoker 2 ppd x 30 years-Still smoke 1 cig, sporadically   Substance and Sexual Activity   • Alcohol use: Yes     Frequency: Monthly or  less     Drinks per session: 1 or 2     Binge frequency: Never     Comment: 1/2 drinks per year   • Drug use: Yes     Types: Marijuana     Comment: occasionally smoked marijuana   • Sexual activity: Defer   Social History Narrative    Caffeine: 1 cup coffee daily and some soda       Allergies   Allergen Reactions   • Penicillins Anaphylaxis   • Tikosyn [Dofetilide] Other (See Comments)     Torsades         Current Outpatient Medications:   •  albuterol sulfate  (90 Base) MCG/ACT inhaler, Inhale 2 puffs Every 4 (Four) Hours As Needed for Wheezing., Disp: 18 g, Rfl: 11  •  apixaban (ELIQUIS) 5 MG tablet tablet, Take 1 tablet by mouth Every 12 (Twelve) Hours. Indications: Atrial Fibrillation, Disp: 60 tablet, Rfl: 0  •  Fluticasone Furoate-Vilanterol (Breo Ellipta) 200-25 MCG/INH inhaler, Inhale 1 puff Daily., Disp: 60 each, Rfl: 6  •  guaiFENesin (MUCINEX) 600 MG 12 hr tablet, Take 1 tablet by mouth 2 (Two) Times a Day. (Patient taking differently: Take 600 mg by mouth Daily.), Disp: 60 tablet, Rfl: 6  •  ipratropium-albuterol (DUO-NEB) 0.5-2.5 mg/3 ml nebulizer, Take 3 mL by nebulization 4 (Four) Times a Day As Needed for Wheezing., Disp: 360 mL, Rfl: 3  •  montelukast (SINGULAIR) 10 MG tablet, Take 1 tablet by mouth Every Night. (Patient taking differently: Take 10 mg by mouth As Needed.), Disp: 30 tablet, Rfl: 11  •  omeprazole (priLOSEC) 40 MG capsule, Take 1 capsule by mouth Daily. (Patient taking differently: Take 40 mg by mouth As Needed.), Disp: 30 capsule, Rfl: 11  •  tiotropium (Spiriva HandiHaler) 18 MCG per inhalation capsule, Place 1 capsule into inhaler and inhale Daily., Disp: 90 capsule, Rfl: 3  •  triamcinolone (KENALOG) 0.1 % cream, APPLY SPARINGLY AND MASSAGE IN TWICE DAILY, Disp: 45 g, Rfl: 0  •  apixaban (ELIQUIS) 5 MG tablet tablet, Take 1 tablet by mouth 2 (Two) Times a Day., Disp: 60 tablet, Rfl: 3    Current Facility-Administered Medications:   •  albuterol (PROVENTIL HFA;VENTOLIN HFA)  inhaler 2 puff, 2 puff, Inhalation, PRN, Bruins, Samreen A, APRN    The following portions of the patient's history were reviewed today and updated as appropriate: allergies, current medications, past family history, past medical history, past social history, past surgical history and problem list     Review of Systems   Constitution: Positive for malaise/fatigue. Negative for chills, decreased appetite, diaphoresis, fever, night sweats, weight gain and weight loss.   HENT: Negative for congestion, hearing loss, hoarse voice and nosebleeds.    Eyes: Negative for blurred vision, visual disturbance and visual halos.   Cardiovascular: Positive for palpitations (occasional ). Negative for chest pain, claudication, cyanosis, dyspnea on exertion, irregular heartbeat, leg swelling, near-syncope, orthopnea, paroxysmal nocturnal dyspnea and syncope.   Respiratory: Negative for cough, hemoptysis, shortness of breath, sleep disturbances due to breathing, snoring, sputum production and wheezing.    Hematologic/Lymphatic: Negative for bleeding problem. Does not bruise/bleed easily.   Skin: Negative for dry skin, itching and rash.   Musculoskeletal: Negative for arthritis, falls, joint pain, joint swelling and myalgias.   Gastrointestinal: Negative for bloating, abdominal pain, constipation, diarrhea, flatus, heartburn, hematemesis, hematochezia, melena, nausea and vomiting.   Genitourinary: Negative for dysuria, frequency, hematuria, nocturia and urgency.   Neurological: Negative for excessive daytime sleepiness, dizziness, headaches, light-headedness, loss of balance and weakness.   Psychiatric/Behavioral: Negative for depression. The patient does not have insomnia and is not nervous/anxious.        Objective:     Vitals:    12/07/20 0911   BP: 113/63   BP Location: Left arm   Patient Position: Sitting   Cuff Size: Adult   Pulse: 86   Resp: 20   Temp: 97.8 °F (36.6 °C)   TempSrc: Temporal   SpO2: 94%   Weight: 68.5 kg (151 lb 2  "oz)   Height: 165.1 cm (65\")       Body mass index is 25.15 kg/m².    Vitals signs and nursing note reviewed.   Constitutional:       General: Not in acute distress.     Appearance: Well-developed.   Eyes:      Conjunctiva/sclera: Conjunctivae normal.      Pupils: Pupils are equal, round, and reactive to light.   HENT:      Head: Normocephalic and atraumatic.   Neck:      Musculoskeletal: Normal range of motion and neck supple.      Thyroid: No thyromegaly.      Vascular: No JVD.      Trachea: No tracheal deviation.   Pulmonary:      Effort: Pulmonary effort is normal.      Breath sounds: Normal breath sounds.   Cardiovascular:      Normal rate. Regular rhythm.   Pulses:     Intact distal pulses.   Abdominal:      General: Bowel sounds are normal. There is no distension.      Palpations: Abdomen is soft.      Tenderness: There is no abdominal tenderness.   Musculoskeletal: Normal range of motion.   Skin:     General: Skin is warm and dry.   Neurological:      Mental Status: Alert and oriented to person, place, and time.   Psychiatric:         Behavior: Behavior normal. Behavior is cooperative.         Lab and Diagnostic Review:  Results for orders placed or performed during the hospital encounter of 09/21/20   Blood Culture - Blood, Hand, Left    Specimen: Hand, Left; Blood   Result Value Ref Range    Blood Culture No growth at 5 days    Blood Culture - Blood, Arm, Right    Specimen: Arm, Right; Blood   Result Value Ref Range    Blood Culture No growth at 5 days    Respiratory Panel PCR w/COVID-19(SARS-CoV-2) [facility]A/[facility] In-House, NP Swab in UTM/VTM, 3-4 HR TAT - Swab, Nasopharynx    Specimen: Nasopharynx; Swab   Result Value Ref Range    ADENOVIRUS, PCR Not Detected Not Detected    Coronavirus 229E Not Detected Not Detected    Coronavirus HKU1 Not Detected Not Detected    Coronavirus NL63 Not Detected Not Detected    Coronavirus OC43 Not Detected Not Detected    COVID19 Not Detected Not Detected - Ref. " Range    Human Metapneumovirus Not Detected Not Detected    Human Rhinovirus/Enterovirus Not Detected Not Detected    Influenza A PCR Not Detected Not Detected    Influenza A H1 Not Detected Not Detected    Influenza A H1 2009 PCR Not Detected Not Detected    Influenza A H3 Not Detected Not Detected    Influenza B PCR Not Detected Not Detected    Parainfluenza Virus 1 Not Detected Not Detected    Parainfluenza Virus 2 Not Detected Not Detected    Parainfluenza Virus 3 Not Detected Not Detected    Parainfluenza Virus 4 Not Detected Not Detected    RSV, PCR Not Detected Not Detected    Bordetella pertussis pcr Not Detected Not Detected    Bordetella parapertussis PCR Not Detected Not Detected    Chlamydophila pneumoniae PCR Not Detected Not Detected    Mycoplasma pneumo by PCR Not Detected Not Detected   Comprehensive Metabolic Panel    Specimen: Blood   Result Value Ref Range    Glucose 326 (H) 65 - 99 mg/dL    BUN 17 8 - 23 mg/dL    Creatinine 0.91 0.57 - 1.00 mg/dL    Sodium 138 136 - 145 mmol/L    Potassium 4.8 3.5 - 5.2 mmol/L    Chloride 100 98 - 107 mmol/L    CO2 31.0 (H) 22.0 - 29.0 mmol/L    Calcium 8.6 8.6 - 10.5 mg/dL    Total Protein 6.3 6.0 - 8.5 g/dL    Albumin 4.10 3.50 - 5.20 g/dL    ALT (SGPT) 91 (H) 1 - 33 U/L    AST (SGOT) 41 (H) 1 - 32 U/L    Alkaline Phosphatase 80 39 - 117 U/L    Total Bilirubin 0.5 0.0 - 1.2 mg/dL    eGFR Non African Amer 63 >60 mL/min/1.73    Globulin 2.2 gm/dL    A/G Ratio 1.9 g/dL    BUN/Creatinine Ratio 18.7 7.0 - 25.0    Anion Gap 7.0 5.0 - 15.0 mmol/L   Lipase    Specimen: Blood   Result Value Ref Range    Lipase 28 13 - 60 U/L   BNP    Specimen: Blood   Result Value Ref Range    proBNP 3,421.0 (H) 0.0 - 900.0 pg/mL   Troponin    Specimen: Blood   Result Value Ref Range    Troponin T <0.010 0.000 - 0.030 ng/mL   Lactic Acid, Plasma    Specimen: Blood   Result Value Ref Range    Lactate 1.7 0.5 - 2.0 mmol/L   Ethanol    Specimen: Blood   Result Value Ref Range    Ethanol  <10 0 - 10 mg/dL   TSH    Specimen: Blood   Result Value Ref Range    TSH 5.430 (H) 0.270 - 4.200 uIU/mL   CBC Auto Differential    Specimen: Blood   Result Value Ref Range    WBC 12.05 (H) 3.40 - 10.80 10*3/mm3    RBC 4.53 3.77 - 5.28 10*6/mm3    Hemoglobin 13.7 12.0 - 15.9 g/dL    Hematocrit 45.9 34.0 - 46.6 %    .3 (H) 79.0 - 97.0 fL    MCH 30.2 26.6 - 33.0 pg    MCHC 29.8 (L) 31.5 - 35.7 g/dL    RDW 12.0 (L) 12.3 - 15.4 %    RDW-SD 45.0 37.0 - 54.0 fl    MPV 12.2 (H) 6.0 - 12.0 fL    Platelets 328 140 - 450 10*3/mm3    Neutrophil % 71.3 42.7 - 76.0 %    Lymphocyte % 19.8 19.6 - 45.3 %    Monocyte % 7.3 5.0 - 12.0 %    Eosinophil % 0.7 0.3 - 6.2 %    Basophil % 0.4 0.0 - 1.5 %    Immature Grans % 0.5 0.0 - 0.5 %    Neutrophils, Absolute 8.60 (H) 1.70 - 7.00 10*3/mm3    Lymphocytes, Absolute 2.38 0.70 - 3.10 10*3/mm3    Monocytes, Absolute 0.88 0.10 - 0.90 10*3/mm3    Eosinophils, Absolute 0.08 0.00 - 0.40 10*3/mm3    Basophils, Absolute 0.05 0.00 - 0.20 10*3/mm3    Immature Grans, Absolute 0.06 (H) 0.00 - 0.05 10*3/mm3    nRBC 0.0 0.0 - 0.2 /100 WBC   Urinalysis With Microscopic If Indicated (No Culture) - Urine, Clean Catch    Specimen: Urine, Clean Catch   Result Value Ref Range    Color, UA Dark Yellow (A) Yellow, Straw    Appearance, UA Cloudy (A) Clear    pH, UA <=5.0 5.0 - 8.0    Specific Gravity, UA 1.026 1.001 - 1.030    Glucose, UA Negative Negative    Ketones, UA Negative Negative    Bilirubin, UA Negative Negative    Blood, UA Negative Negative    Protein,  mg/dL (2+) (A) Negative    Leuk Esterase, UA Trace (A) Negative    Nitrite, UA Positive (A) Negative    Urobilinogen, UA 1.0 E.U./dL 0.2 - 1.0 E.U./dL   Urine Drug Screen - Urine, Clean Catch    Specimen: Urine, Clean Catch   Result Value Ref Range    THC, Screen, Urine Positive (A) Negative    Phencyclidine (PCP), Urine Negative Negative    Cocaine Screen, Urine Negative Negative    Methamphetamine, Ur Negative Negative    Opiate  Screen Negative Negative    Amphetamine Screen, Urine Negative Negative    Benzodiazepine Screen, Urine Negative Negative    Tricyclic Antidepressants Screen Negative Negative    Methadone Screen, Urine Negative Negative    Barbiturates Screen, Urine Negative Negative    Oxycodone Screen, Urine Negative Negative    Propoxyphene Screen Negative Negative    Buprenorphine, Screen, Urine Negative Negative   Blood Gas, Arterial With Co-Ox    Specimen: Arterial Blood   Result Value Ref Range    Dante's Test N/A     pH, Arterial 7.221 (L) 7.350 - 7.450 pH units    pCO2, Arterial 64.4 (H) 35.0 - 45.0 mm Hg    pO2, Arterial 157.0 (H) 83.0 - 108.0 mm Hg    HCO3, Arterial 26.4 (H) 20.0 - 26.0 mmol/L    Base Excess, Arterial -2.6 (L) 0.0 - 2.0 mmol/L    Hemoglobin, Blood Gas 13.2 (L) 14 - 18 g/dL    Hematocrit, Blood Gas 40.4 %    Oxyhemoglobin 98.1 94 - 99 %    Methemoglobin 0.60 0.00 - 1.50 %    Carboxyhemoglobin 0.7 0 - 2 %    CO2 Content 28.4 22 - 33 mmol/L    Temperature 37.0 C    Barometric Pressure for Blood Gas      Modality BiPap     FIO2 50 %    Rate 0 Breaths/minute    PIP 0 cmH2O    IPAP 0     EPAP 0     Note      pH, Temp Corrected 7.221 pH Units    pCO2, Temperature Corrected 64.4 (H) 35 - 45 mm Hg    pO2, Temperature Corrected 157 (H) 83 - 108 mm Hg   Urinalysis, Microscopic Only - Urine, Clean Catch    Specimen: Urine, Clean Catch   Result Value Ref Range    RBC, UA 3-6 (A) None Seen, 0-2 /HPF    WBC, UA 6-12 (A) None Seen, 0-2 /HPF    Bacteria, UA 3+ (A) None Seen, Trace /HPF    Squamous Epithelial Cells, UA 3-6 (A) None Seen, 0-2 /HPF    Hyaline Casts, UA None Seen 0 - 6 /LPF    WBC Clumps, UA Small/1+ None Seen /HPF    Methodology Manual Light Microscopy    aPTT    Specimen: Blood   Result Value Ref Range    PTT 28.5 (L) 50.0 - 95.0 seconds   Heparin Anti-Xa    Specimen: Blood   Result Value Ref Range    Heparin Anti-Xa (UFH) 0.10 (L) 0.30 - 0.70 IU/ml   Protime-INR    Specimen: Blood   Result Value Ref Range     Protime 14.3 (H) 11.5 - 14.0 Seconds    INR 1.14 0.85 - 1.16   Phosphorus    Specimen: Blood   Result Value Ref Range    Phosphorus 6.7 (H) 2.5 - 4.5 mg/dL   Heparin Anti-Xa    Specimen: Blood   Result Value Ref Range    Heparin Anti-Xa (UFH) 0.16 (L) 0.30 - 0.70 IU/ml   Heparin Anti-Xa    Specimen: Blood   Result Value Ref Range    Heparin Anti-Xa (UFH) 0.20 (L) 0.30 - 0.70 IU/ml   Comprehensive Metabolic Panel    Specimen: Blood   Result Value Ref Range    Glucose 164 (H) 65 - 99 mg/dL    BUN 15 8 - 23 mg/dL    Creatinine 0.73 0.57 - 1.00 mg/dL    Sodium 138 136 - 145 mmol/L    Potassium 5.3 (H) 3.5 - 5.2 mmol/L    Chloride 101 98 - 107 mmol/L    CO2 28.0 22.0 - 29.0 mmol/L    Calcium 8.6 8.6 - 10.5 mg/dL    Total Protein 5.6 (L) 6.0 - 8.5 g/dL    Albumin 3.50 3.50 - 5.20 g/dL    ALT (SGPT) 68 (H) 1 - 33 U/L    AST (SGOT) 21 1 - 32 U/L    Alkaline Phosphatase 66 39 - 117 U/L    Total Bilirubin 0.5 0.0 - 1.2 mg/dL    eGFR Non African Amer 81 >60 mL/min/1.73    Globulin 2.1 gm/dL    A/G Ratio 1.7 g/dL    BUN/Creatinine Ratio 20.5 7.0 - 25.0    Anion Gap 9.0 5.0 - 15.0 mmol/L   Magnesium    Specimen: Blood   Result Value Ref Range    Magnesium 2.2 1.6 - 2.4 mg/dL   Phosphorus    Specimen: Blood   Result Value Ref Range    Phosphorus 3.4 2.5 - 4.5 mg/dL   CBC Auto Differential    Specimen: Blood   Result Value Ref Range    WBC 17.22 (H) 3.40 - 10.80 10*3/mm3    RBC 4.12 3.77 - 5.28 10*6/mm3    Hemoglobin 12.5 12.0 - 15.9 g/dL    Hematocrit 40.7 34.0 - 46.6 %    MCV 98.8 (H) 79.0 - 97.0 fL    MCH 30.3 26.6 - 33.0 pg    MCHC 30.7 (L) 31.5 - 35.7 g/dL    RDW 12.1 (L) 12.3 - 15.4 %    RDW-SD 43.8 37.0 - 54.0 fl    MPV 12.1 (H) 6.0 - 12.0 fL    Platelets 226 140 - 450 10*3/mm3    Neutrophil % 90.3 (H) 42.7 - 76.0 %    Lymphocyte % 5.8 (L) 19.6 - 45.3 %    Monocyte % 3.0 (L) 5.0 - 12.0 %    Eosinophil % 0.0 (L) 0.3 - 6.2 %    Basophil % 0.1 0.0 - 1.5 %    Immature Grans % 0.8 (H) 0.0 - 0.5 %    Neutrophils, Absolute  15.56 (H) 1.70 - 7.00 10*3/mm3    Lymphocytes, Absolute 1.00 0.70 - 3.10 10*3/mm3    Monocytes, Absolute 0.52 0.10 - 0.90 10*3/mm3    Eosinophils, Absolute 0.00 0.00 - 0.40 10*3/mm3    Basophils, Absolute 0.01 0.00 - 0.20 10*3/mm3    Immature Grans, Absolute 0.13 (H) 0.00 - 0.05 10*3/mm3    nRBC 0.0 0.0 - 0.2 /100 WBC   Heparin Anti-Xa    Specimen: Blood   Result Value Ref Range    Heparin Anti-Xa (UFH) 0.68 0.30 - 0.70 IU/ml   Hemoglobin A1c    Specimen: Blood   Result Value Ref Range    Hemoglobin A1C 6.30 (H) 4.80 - 5.60 %   Heparin Anti-Xa    Specimen: Blood   Result Value Ref Range    Heparin Anti-Xa (UFH) 0.52 0.30 - 0.70 IU/ml   Procalcitonin    Specimen: Blood   Result Value Ref Range    Procalcitonin 0.16 0.00 - 0.25 ng/mL   Heparin Anti-Xa    Specimen: Blood   Result Value Ref Range    Heparin Anti-Xa (UFH) 0.35 0.30 - 0.70 IU/ml   Basic Metabolic Panel    Specimen: Blood   Result Value Ref Range    Glucose 128 (H) 65 - 99 mg/dL    BUN 37 (H) 8 - 23 mg/dL    Creatinine 1.01 (H) 0.57 - 1.00 mg/dL    Sodium 138 136 - 145 mmol/L    Potassium 5.3 (H) 3.5 - 5.2 mmol/L    Chloride 99 98 - 107 mmol/L    CO2 27.0 22.0 - 29.0 mmol/L    Calcium 9.1 8.6 - 10.5 mg/dL    eGFR Non African Amer 56 (L) >60 mL/min/1.73    BUN/Creatinine Ratio 36.6 (H) 7.0 - 25.0    Anion Gap 12.0 5.0 - 15.0 mmol/L   Magnesium    Specimen: Blood   Result Value Ref Range    Magnesium 2.5 (H) 1.6 - 2.4 mg/dL   proBNP    Specimen: Blood   Result Value Ref Range    proBNP 4,745.0 (H) 0.0 - 900.0 pg/mL   Heparin Anti-Xa    Specimen: Blood   Result Value Ref Range    Heparin Anti-Xa (UFH) 0.32 0.30 - 0.70 IU/ml   Magnesium    Specimen: Blood   Result Value Ref Range    Magnesium 2.4 1.6 - 2.4 mg/dL   Heparin Anti-Xa    Specimen: Blood   Result Value Ref Range    Heparin Anti-Xa (UFH) 0.49 0.30 - 0.70 IU/ml   Basic Metabolic Panel    Specimen: Blood   Result Value Ref Range    Glucose 110 (H) 65 - 99 mg/dL    BUN 33 (H) 8 - 23 mg/dL    Creatinine  0.63 0.57 - 1.00 mg/dL    Sodium 136 136 - 145 mmol/L    Potassium 4.8 3.5 - 5.2 mmol/L    Chloride 101 98 - 107 mmol/L    CO2 29.0 22.0 - 29.0 mmol/L    Calcium 8.8 8.6 - 10.5 mg/dL    eGFR Non African Amer 96 >60 mL/min/1.73    BUN/Creatinine Ratio 52.4 (H) 7.0 - 25.0    Anion Gap 6.0 5.0 - 15.0 mmol/L   Magnesium    Specimen: Blood   Result Value Ref Range    Magnesium 2.4 1.6 - 2.4 mg/dL   Phosphorus    Specimen: Blood   Result Value Ref Range    Phosphorus 3.6 2.5 - 4.5 mg/dL   proBNP    Specimen: Blood   Result Value Ref Range    proBNP 3,371.0 (H) 0.0 - 900.0 pg/mL   Heparin Anti-Xa    Specimen: Blood   Result Value Ref Range    Heparin Anti-Xa (UFH) 0.32 0.30 - 0.70 IU/ml   CBC Auto Differential    Specimen: Blood   Result Value Ref Range    WBC 15.29 (H) 3.40 - 10.80 10*3/mm3    RBC 4.01 3.77 - 5.28 10*6/mm3    Hemoglobin 11.9 (L) 12.0 - 15.9 g/dL    Hematocrit 39.0 34.0 - 46.6 %    MCV 97.3 (H) 79.0 - 97.0 fL    MCH 29.7 26.6 - 33.0 pg    MCHC 30.5 (L) 31.5 - 35.7 g/dL    RDW 12.6 12.3 - 15.4 %    RDW-SD 44.9 37.0 - 54.0 fl    MPV 11.6 6.0 - 12.0 fL    Platelets 243 140 - 450 10*3/mm3    Neutrophil % 86.2 (H) 42.7 - 76.0 %    Lymphocyte % 5.2 (L) 19.6 - 45.3 %    Monocyte % 7.2 5.0 - 12.0 %    Eosinophil % 0.0 (L) 0.3 - 6.2 %    Basophil % 0.1 0.0 - 1.5 %    Immature Grans % 1.3 (H) 0.0 - 0.5 %    Neutrophils, Absolute 13.18 (H) 1.70 - 7.00 10*3/mm3    Lymphocytes, Absolute 0.80 0.70 - 3.10 10*3/mm3    Monocytes, Absolute 1.10 (H) 0.10 - 0.90 10*3/mm3    Eosinophils, Absolute 0.00 0.00 - 0.40 10*3/mm3    Basophils, Absolute 0.01 0.00 - 0.20 10*3/mm3    Immature Grans, Absolute 0.20 (H) 0.00 - 0.05 10*3/mm3    nRBC 0.0 0.0 - 0.2 /100 WBC   Magnesium    Specimen: Blood   Result Value Ref Range    Magnesium 3.8 (H) 1.6 - 2.4 mg/dL   Basic Metabolic Panel    Specimen: Blood   Result Value Ref Range    Glucose 177 (H) 65 - 99 mg/dL    BUN 22 8 - 23 mg/dL    Creatinine 0.54 (L) 0.57 - 1.00 mg/dL    Sodium 137  136 - 145 mmol/L    Potassium 4.6 3.5 - 5.2 mmol/L    Chloride 100 98 - 107 mmol/L    CO2 29.0 22.0 - 29.0 mmol/L    Calcium 8.4 (L) 8.6 - 10.5 mg/dL    eGFR Non African Amer 114 >60 mL/min/1.73    BUN/Creatinine Ratio 40.7 (H) 7.0 - 25.0    Anion Gap 8.0 5.0 - 15.0 mmol/L   Magnesium    Specimen: Blood   Result Value Ref Range    Magnesium 2.6 (H) 1.6 - 2.4 mg/dL   Basic Metabolic Panel    Specimen: Blood   Result Value Ref Range    Glucose 69 65 - 99 mg/dL    BUN 19 8 - 23 mg/dL    Creatinine 0.55 (L) 0.57 - 1.00 mg/dL    Sodium 140 136 - 145 mmol/L    Potassium 4.3 3.5 - 5.2 mmol/L    Chloride 101 98 - 107 mmol/L    CO2 34.0 (H) 22.0 - 29.0 mmol/L    Calcium 8.7 8.6 - 10.5 mg/dL    eGFR Non African Amer 112 >60 mL/min/1.73    BUN/Creatinine Ratio 34.5 (H) 7.0 - 25.0    Anion Gap 5.0 5.0 - 15.0 mmol/L   CBC Auto Differential    Specimen: Blood   Result Value Ref Range    WBC 11.51 (H) 3.40 - 10.80 10*3/mm3    RBC 4.02 3.77 - 5.28 10*6/mm3    Hemoglobin 12.0 12.0 - 15.9 g/dL    Hematocrit 38.9 34.0 - 46.6 %    MCV 96.8 79.0 - 97.0 fL    MCH 29.9 26.6 - 33.0 pg    MCHC 30.8 (L) 31.5 - 35.7 g/dL    RDW 12.4 12.3 - 15.4 %    RDW-SD 44.1 37.0 - 54.0 fl    MPV 11.3 6.0 - 12.0 fL    Platelets 210 140 - 450 10*3/mm3    Neutrophil % 81.8 (H) 42.7 - 76.0 %    Lymphocyte % 8.9 (L) 19.6 - 45.3 %    Monocyte % 8.6 5.0 - 12.0 %    Eosinophil % 0.2 (L) 0.3 - 6.2 %    Basophil % 0.1 0.0 - 1.5 %    Immature Grans % 0.4 0.0 - 0.5 %    Neutrophils, Absolute 9.42 (H) 1.70 - 7.00 10*3/mm3    Lymphocytes, Absolute 1.02 0.70 - 3.10 10*3/mm3    Monocytes, Absolute 0.99 (H) 0.10 - 0.90 10*3/mm3    Eosinophils, Absolute 0.02 0.00 - 0.40 10*3/mm3    Basophils, Absolute 0.01 0.00 - 0.20 10*3/mm3    Immature Grans, Absolute 0.05 0.00 - 0.05 10*3/mm3    nRBC 0.0 0.0 - 0.2 /100 WBC   Magnesium    Specimen: Blood   Result Value Ref Range    Magnesium 2.1 1.6 - 2.4 mg/dL   Basic Metabolic Panel    Specimen: Blood   Result Value Ref Range     Glucose 76 65 - 99 mg/dL    BUN 16 8 - 23 mg/dL    Creatinine 0.57 0.57 - 1.00 mg/dL    Sodium 138 136 - 145 mmol/L    Potassium 4.2 3.5 - 5.2 mmol/L    Chloride 100 98 - 107 mmol/L    CO2 32.0 (H) 22.0 - 29.0 mmol/L    Calcium 8.7 8.6 - 10.5 mg/dL    eGFR Non African Amer 107 >60 mL/min/1.73    BUN/Creatinine Ratio 28.1 (H) 7.0 - 25.0    Anion Gap 6.0 5.0 - 15.0 mmol/L   Magnesium    Specimen: Blood   Result Value Ref Range    Magnesium 2.1 1.6 - 2.4 mg/dL   Basic Metabolic Panel    Specimen: Blood   Result Value Ref Range    Glucose 100 (H) 65 - 99 mg/dL    BUN 14 8 - 23 mg/dL    Creatinine 0.58 0.57 - 1.00 mg/dL    Sodium 141 136 - 145 mmol/L    Potassium 4.4 3.5 - 5.2 mmol/L    Chloride 101 98 - 107 mmol/L    CO2 32.0 (H) 22.0 - 29.0 mmol/L    Calcium 8.4 (L) 8.6 - 10.5 mg/dL    eGFR Non African Amer 105 >60 mL/min/1.73    BUN/Creatinine Ratio 24.1 7.0 - 25.0    Anion Gap 8.0 5.0 - 15.0 mmol/L   Magnesium    Specimen: Blood   Result Value Ref Range    Magnesium 2.2 1.6 - 2.4 mg/dL   Basic Metabolic Panel    Specimen: Blood   Result Value Ref Range    Glucose 125 (H) 65 - 99 mg/dL    BUN 18 8 - 23 mg/dL    Creatinine 0.59 0.57 - 1.00 mg/dL    Sodium 139 136 - 145 mmol/L    Potassium 5.0 3.5 - 5.2 mmol/L    Chloride 98 98 - 107 mmol/L    CO2 34.0 (H) 22.0 - 29.0 mmol/L    Calcium 9.1 8.6 - 10.5 mg/dL    eGFR Non African Amer 103 >60 mL/min/1.73    BUN/Creatinine Ratio 30.5 (H) 7.0 - 25.0    Anion Gap 7.0 5.0 - 15.0 mmol/L   CBC Auto Differential    Specimen: Blood   Result Value Ref Range    WBC 11.74 (H) 3.40 - 10.80 10*3/mm3    RBC 4.79 3.77 - 5.28 10*6/mm3    Hemoglobin 14.5 12.0 - 15.9 g/dL    Hematocrit 46.4 34.0 - 46.6 %    MCV 96.9 79.0 - 97.0 fL    MCH 30.3 26.6 - 33.0 pg    MCHC 31.3 (L) 31.5 - 35.7 g/dL    RDW 12.3 12.3 - 15.4 %    RDW-SD 43.5 37.0 - 54.0 fl    MPV 10.8 6.0 - 12.0 fL    Platelets 278 140 - 450 10*3/mm3    Neutrophil % 71.7 42.7 - 76.0 %    Lymphocyte % 16.4 (L) 19.6 - 45.3 %    Monocyte  % 9.4 5.0 - 12.0 %    Eosinophil % 1.8 0.3 - 6.2 %    Basophil % 0.3 0.0 - 1.5 %    Immature Grans % 0.4 0.0 - 0.5 %    Neutrophils, Absolute 8.41 (H) 1.70 - 7.00 10*3/mm3    Lymphocytes, Absolute 1.93 0.70 - 3.10 10*3/mm3    Monocytes, Absolute 1.10 (H) 0.10 - 0.90 10*3/mm3    Eosinophils, Absolute 0.21 0.00 - 0.40 10*3/mm3    Basophils, Absolute 0.04 0.00 - 0.20 10*3/mm3    Immature Grans, Absolute 0.05 0.00 - 0.05 10*3/mm3    nRBC 0.0 0.0 - 0.2 /100 WBC   Magnesium    Specimen: Blood   Result Value Ref Range    Magnesium 2.2 1.6 - 2.4 mg/dL   Basic Metabolic Panel    Specimen: Blood   Result Value Ref Range    Glucose 116 (H) 65 - 99 mg/dL    BUN 16 8 - 23 mg/dL    Creatinine 0.60 0.57 - 1.00 mg/dL    Sodium 139 136 - 145 mmol/L    Potassium 4.6 3.5 - 5.2 mmol/L    Chloride 101 98 - 107 mmol/L    CO2 30.0 (H) 22.0 - 29.0 mmol/L    Calcium 9.0 8.6 - 10.5 mg/dL    eGFR Non African Amer 101 >60 mL/min/1.73    BUN/Creatinine Ratio 26.7 (H) 7.0 - 25.0    Anion Gap 8.0 5.0 - 15.0 mmol/L   Phosphorus    Specimen: Blood   Result Value Ref Range    Phosphorus 3.8 2.5 - 4.5 mg/dL   CBC Auto Differential    Specimen: Blood   Result Value Ref Range    WBC 10.76 3.40 - 10.80 10*3/mm3    RBC 4.66 3.77 - 5.28 10*6/mm3    Hemoglobin 14.0 12.0 - 15.9 g/dL    Hematocrit 44.8 34.0 - 46.6 %    MCV 96.1 79.0 - 97.0 fL    MCH 30.0 26.6 - 33.0 pg    MCHC 31.3 (L) 31.5 - 35.7 g/dL    RDW 12.4 12.3 - 15.4 %    RDW-SD 43.8 37.0 - 54.0 fl    MPV 10.6 6.0 - 12.0 fL    Platelets 271 140 - 450 10*3/mm3    Neutrophil % 70.6 42.7 - 76.0 %    Lymphocyte % 17.6 (L) 19.6 - 45.3 %    Monocyte % 9.5 5.0 - 12.0 %    Eosinophil % 1.3 0.3 - 6.2 %    Basophil % 0.3 0.0 - 1.5 %    Immature Grans % 0.7 (H) 0.0 - 0.5 %    Neutrophils, Absolute 7.61 (H) 1.70 - 7.00 10*3/mm3    Lymphocytes, Absolute 1.89 0.70 - 3.10 10*3/mm3    Monocytes, Absolute 1.02 (H) 0.10 - 0.90 10*3/mm3    Eosinophils, Absolute 0.14 0.00 - 0.40 10*3/mm3    Basophils, Absolute 0.03  0.00 - 0.20 10*3/mm3    Immature Grans, Absolute 0.07 (H) 0.00 - 0.05 10*3/mm3    nRBC 0.0 0.0 - 0.2 /100 WBC   Magnesium    Specimen: Blood   Result Value Ref Range    Magnesium 2.1 1.6 - 2.4 mg/dL   Basic Metabolic Panel    Specimen: Blood   Result Value Ref Range    Glucose 98 65 - 99 mg/dL    BUN 16 8 - 23 mg/dL    Creatinine 0.69 0.57 - 1.00 mg/dL    Sodium 140 136 - 145 mmol/L    Potassium 4.4 3.5 - 5.2 mmol/L    Chloride 103 98 - 107 mmol/L    CO2 31.0 (H) 22.0 - 29.0 mmol/L    Calcium 8.8 8.6 - 10.5 mg/dL    eGFR Non African Amer 86 >60 mL/min/1.73    BUN/Creatinine Ratio 23.2 7.0 - 25.0    Anion Gap 6.0 5.0 - 15.0 mmol/L   Magnesium    Specimen: Blood   Result Value Ref Range    Magnesium 2.4 1.6 - 2.4 mg/dL   Basic Metabolic Panel    Specimen: Blood   Result Value Ref Range    Glucose 90 65 - 99 mg/dL    BUN 16 8 - 23 mg/dL    Creatinine 0.58 0.57 - 1.00 mg/dL    Sodium 137 136 - 145 mmol/L    Potassium 4.3 3.5 - 5.2 mmol/L    Chloride 101 98 - 107 mmol/L    CO2 28.0 22.0 - 29.0 mmol/L    Calcium 8.7 8.6 - 10.5 mg/dL    eGFR Non African Amer 105 >60 mL/min/1.73    BUN/Creatinine Ratio 27.6 (H) 7.0 - 25.0    Anion Gap 8.0 5.0 - 15.0 mmol/L   POC Creatinine    Specimen: Blood   Result Value Ref Range    Creatinine 0.90 0.60 - 1.30 mg/dL   POC Surgery Labs    Specimen: Blood   Result Value Ref Range    Ionized Calcium 1.25 1.20 - 1.32 mmol/L    POC Potassium 4.7 3.5 - 4.9 mmol/L    Sodium 139 138 - 146 mmol/L    Total CO2 36 (H) 24 - 29 mmol/L    Hemoglobin 15.3 12.0 - 17.0 g/dL    Hematocrit 45 38 - 51 %    pCO2, Arterial 102.7 (H) 35 - 45 mm Hg    pO2, Arterial 33 (L) 80 - 105 mmHg    Base Excess 3.0000 -5 - 5 mmol/L    O2 Saturation, Arterial 41 (L) 95 - 98 %    pH, Arterial 7.11 (L) 7.35 - 7.6 pH units    HCO3, Arterial 32.8 (H) 22 - 26 mmol/L    Glucose 311 (H) 70 - 130 mg/dL   POC Glucose Once    Specimen: Blood   Result Value Ref Range    Glucose 179 (H) 70 - 130 mg/dL   POC Glucose Once    Specimen:  Blood   Result Value Ref Range    Glucose 186 (H) 70 - 130 mg/dL   POC Glucose Once    Specimen: Blood   Result Value Ref Range    Glucose 205 (H) 70 - 130 mg/dL   POC Glucose Once    Specimen: Blood   Result Value Ref Range    Glucose 156 (H) 70 - 130 mg/dL   POC Glucose Once    Specimen: Blood   Result Value Ref Range    Glucose 170 (H) 70 - 130 mg/dL   POC Glucose Once    Specimen: Blood   Result Value Ref Range    Glucose 198 (H) 70 - 130 mg/dL   POC Glucose Once    Specimen: Blood   Result Value Ref Range    Glucose 228 (H) 70 - 130 mg/dL   POC Glucose Once    Specimen: Blood   Result Value Ref Range    Glucose 140 (H) 70 - 130 mg/dL   POC Glucose Once    Specimen: Blood   Result Value Ref Range    Glucose 126 70 - 130 mg/dL   POC Glucose Once    Specimen: Blood   Result Value Ref Range    Glucose 148 (H) 70 - 130 mg/dL   POC Glucose Once    Specimen: Blood   Result Value Ref Range    Glucose 266 (H) 70 - 130 mg/dL   POC Glucose Once    Specimen: Blood   Result Value Ref Range    Glucose 107 70 - 130 mg/dL   POC Glucose Once    Specimen: Blood   Result Value Ref Range    Glucose 139 (H) 70 - 130 mg/dL   POC Glucose Once    Specimen: Blood   Result Value Ref Range    Glucose 210 (H) 70 - 130 mg/dL   POC Glucose Once    Specimen: Blood   Result Value Ref Range    Glucose 169 (H) 70 - 130 mg/dL   POC Glucose Once    Specimen: Blood   Result Value Ref Range    Glucose 78 70 - 130 mg/dL   POC Glucose Once    Specimen: Blood   Result Value Ref Range    Glucose 99 70 - 130 mg/dL   POC Glucose Once    Specimen: Blood   Result Value Ref Range    Glucose 157 (H) 70 - 130 mg/dL   POC Glucose Once    Specimen: Blood   Result Value Ref Range    Glucose 217 (H) 70 - 130 mg/dL   POC Glucose Once    Specimen: Blood   Result Value Ref Range    Glucose 75 70 - 130 mg/dL   POC Glucose Once    Specimen: Blood   Result Value Ref Range    Glucose 92 70 - 130 mg/dL   POC Glucose Once    Specimen: Blood   Result Value Ref Range     Glucose 133 (H) 70 - 130 mg/dL   POC Glucose Once    Specimen: Blood   Result Value Ref Range    Glucose 180 (H) 70 - 130 mg/dL   POC Glucose Once    Specimen: Blood   Result Value Ref Range    Glucose 128 70 - 130 mg/dL   POC Glucose Once    Specimen: Blood   Result Value Ref Range    Glucose 141 (H) 70 - 130 mg/dL   POC Glucose Once    Specimen: Blood   Result Value Ref Range    Glucose 96 70 - 130 mg/dL   POC Glucose Once    Specimen: Blood   Result Value Ref Range    Glucose 138 (H) 70 - 130 mg/dL   POC Glucose Once    Specimen: Blood   Result Value Ref Range    Glucose 125 70 - 130 mg/dL   POC Glucose Once    Specimen: Blood   Result Value Ref Range    Glucose 158 (H) 70 - 130 mg/dL   POC Glucose Once    Specimen: Blood   Result Value Ref Range    Glucose 87 70 - 130 mg/dL   POC Glucose Once    Specimen: Blood   Result Value Ref Range    Glucose 137 (H) 70 - 130 mg/dL   POC Glucose Once    Specimen: Blood   Result Value Ref Range    Glucose 108 70 - 130 mg/dL   POC Glucose Once    Specimen: Blood   Result Value Ref Range    Glucose 115 70 - 130 mg/dL   POC Glucose Once    Specimen: Blood   Result Value Ref Range    Glucose 109 70 - 130 mg/dL   POC Glucose Once    Specimen: Blood   Result Value Ref Range    Glucose 187 (H) 70 - 130 mg/dL   POC Glucose Once    Specimen: Blood   Result Value Ref Range    Glucose 114 70 - 130 mg/dL   POC Glucose Once    Specimen: Blood   Result Value Ref Range    Glucose 102 70 - 130 mg/dL   POC Glucose Once    Specimen: Blood   Result Value Ref Range    Glucose 130 70 - 130 mg/dL   POC Glucose Once    Specimen: Blood   Result Value Ref Range    Glucose 251 (H) 70 - 130 mg/dL   POC Glucose Once    Specimen: Blood   Result Value Ref Range    Glucose 164 (H) 70 - 130 mg/dL   POC Glucose Once    Specimen: Blood   Result Value Ref Range    Glucose 230 (H) 70 - 130 mg/dL   Adult Transthoracic Echo Complete W/ Cont if Necessary Per Protocol   Result Value Ref Range    BH CV VAS BP RIGHT  ARM 93/64 mmHg    BSA 1.8 m^2    IVSd 1.1 cm    LVIDd 4.3 cm    LVIDs 3.3 cm    LVPWd 1.1 cm    IVS/LVPW 0.95     FS 22.5 %    EDV(Teich) 83.1 ml    ESV(Teich) 45.2 ml    EF(Teich) 45.6 %    EDV(cubed) 79.6 ml    ESV(cubed) 37.0 ml    EF(cubed) 53.4 %    LV mass(C)d 160.2 grams    LV mass(C)dI 91.3 grams/m^2    SV(Teich) 37.9 ml    SI(Teich) 21.6 ml/m^2    SV(cubed) 42.5 ml    SI(cubed) 24.2 ml/m^2    MV Diam 3.0 cm    Ao root diam 3.0 cm    Ao root area 7.0 cm^2    LA dimension 4.1 cm    LA/Ao 1.4     LVOT diam 2.0 cm    LVOT area 3.1 cm^2    LVOT area(traced) 3.1 cm^2    LAd major 5.4 cm    LVLd ap4 7.0 cm    EDV(MOD-sp4) 78.0 ml    LVLs ap4 7.3 cm    ESV(MOD-sp4) 53.0 ml    EF(MOD-sp4) 32.1 %    LVLd ap2 7.4 cm    EDV(MOD-sp2) 65.0 ml    LVLs ap2 6.7 cm    ESV(MOD-sp2) 41.0 ml    EF(MOD-sp2) 36.9 %    LA volume 72.0 ml    SV(MOD-sp4) 25.0 ml    SI(MOD-sp4) 14.2 ml/m^2    SV(MOD-sp2) 24.0 ml    SI(MOD-sp2) 13.7 ml/m^2    Ao root area (BSA corrected) 1.7     LV Diamond Vol (BSA corrected) 44.4 ml/m^2    LV Sys Vol (BSA corrected) 30.2 ml/m^2    LA Volume Index 41.0 ml/m^2    MV E max francis 74.6 cm/sec    MV V2 max 106.7 cm/sec    MV max PG 4.6 mmHg    MV V2 mean 78.9 cm/sec    MV mean PG 2.7 mmHg    MV V2 VTI 18.6 cm    MV area (1 diam) 7.2 cm^2    MVA(VTI) 1.5 cm^2    MV Flow area(1diam) 7.2 cm^2    MV dec time 0.17 sec    Ao pk francis 123.1 cm/sec    Ao max PG 6.1 mmHg    Ao max PG (full) 4.5 mmHg    AMINTA(V,A) 1.6 cm^2    AMINTA(V,D) 1.6 cm^2    LV V1 max PG 1.6 mmHg    LV V1 mean PG 0.92 mmHg    LV V1 max 63.2 cm/sec    LV V1 mean 44.0 cm/sec    LV V1 VTI 8.8 cm    MR max francis 409.6 cm/sec    MR max PG 68.0 mmHg    MR mean francis 323.5 cm/sec    MR mean PG 45.6 mmHg    MR .5 cm    MR PISA 2.0 cm^2    MR flow rate 61.3 cm^3/sec    MR ERO 0.15 cm^2    MR volume 15.3 ml    MR PISA radius 0.56 cm    MR alias francis 30.8 cm/sec    SV(MV 1 diam) 134.1 ml    SI(MV 1 diam) 76.4 ml/m^2    SV(LVOT) 27.4 ml    SI(LVOT) 15.6 ml/m^2     TR max molina 286 cm/sec    TR max PG 33 mmHg    RF(MV,LVOT)(1diam) 0.8     Lat E/e'  7.4     Med E/e' 7.4     Lat Peak E' Molina 10.0 cm/sec    Med Peak E' Molina 10.0 cm/sec     CV ECHO HOLLY - BZI_BMI 25.1 kilograms/m^2     CV ECHO HOLLY - BSA(3ROAM) 1.8 m^2     CV ECHO HOLLY - BZI_METRIC_WEIGHT 68.5 kg     CV ECHO HOLLY - BZI_METRIC_HEIGHT 165.1 cm    Avg E/e' ratio 7.46     RV Base 3.30 cm    RV Length 6.60 cm    RV Mid 2.90 cm    MV regurgitant fraction 14 %    MV vena contracta 0.65 cm    PISA ALIASING MOLINA 3.10 m/s    Radius 0.6 cm    RAP systole 15 mmHg    RVSP(TR) 48 mmHg   Adult Transesophageal Echo (IRMA) W/ Cont if Necessary Per Protocol   Result Value Ref Range    BSA 1.7 m^2     CV ECHO HOLLY - BZI_BMI 25.9 kilograms/m^2     CV ECHO HOLLY - BSA(3ROAM) 1.8 m^2     CV ECHO HOLLY - BZI_METRIC_WEIGHT 68.5 kg     CV ECHO HOLLY - BZI_METRIC_HEIGHT 162.6 cm     CV VAS BP LEFT ARM 92/69 mmHg    Echo EF Estimated 45 %         Assessment and Plan:   1. Chronic systolic heart failure (CMS/HCC)  euvolemic  Heart failure education today including signs and symptoms, the role of the heart failure center, daily weights, low sodium diet (less than 1500 mg per day), and daily physical activity. Reviewed HF Zones with patient and family.  Patient to continue current medications as previously ordered.   No BB due to COPD  No ace/arb/arni due to hypotension  2. Persistent atrial fibrillation (CMS/HCC)  Maintaining nsr  CHADS-VASc Risk Assessment            2       Total Score        1 CHF    1 Sex: Female        Criteria that do not apply:    Hypertension    Age >/= 75    DM    PRIOR STROKE/TIA/THROMBO    Vascular Disease    Age 65-74        Anticoagulated with eliquis and denies s/s of bleeding   3. Centrilobular emphysema (CMS/HCC)  stable  4. Dyslipidemia  Diet controlled            It has been a pleasure to participate in the care of this patient.  Patient was instructed to call the [facility] with any  questions, concerns, or worsening symptoms.    *Please note that portions of this note were completed with a voice recognition program. Efforts were made to edit the dictations, but occasionally words are mistranscribed.

## 2021-01-04 ENCOUNTER — OFFICE VISIT (OUTPATIENT)
Dept: CARDIOLOGY | Facility: CLINIC | Age: 61
End: 2021-01-04

## 2021-01-04 VITALS
BODY MASS INDEX: 22.97 KG/M2 | OXYGEN SATURATION: 97 % | HEART RATE: 84 BPM | WEIGHT: 151.6 LBS | HEIGHT: 68 IN | DIASTOLIC BLOOD PRESSURE: 68 MMHG | SYSTOLIC BLOOD PRESSURE: 112 MMHG

## 2021-01-04 DIAGNOSIS — I44.2 COMPLETE HEART BLOCK (HCC): Primary | ICD-10-CM

## 2021-01-04 DIAGNOSIS — I48.91 ATRIAL FIBRILLATION WITH RAPID VENTRICULAR RESPONSE (HCC): ICD-10-CM

## 2021-01-04 DIAGNOSIS — Z95.0 MRI SAFE CARDIAC PACEMAKER IN SITU: ICD-10-CM

## 2021-01-04 PROCEDURE — 93279 PRGRMG DEV EVAL PM/LDLS PM: CPT | Performed by: INTERNAL MEDICINE

## 2021-01-04 PROCEDURE — 99214 OFFICE O/P EST MOD 30 MIN: CPT | Performed by: INTERNAL MEDICINE

## 2021-01-04 NOTE — PROGRESS NOTES
Cardiac Electrophysiology Outpatient Follow Up Note            [facility]    Follow Up Office Visit      Nolvia Huff  [id number]  01/04/2021  [unfilled]  [unfilled]    Primary Care Physician: Alfonso Denney MD    Referred By: No ref. provider found    Subjective     Chief Complaint:   Diagnoses and all orders for this visit:    1. Complete heart block (CMS/HCC) (Primary)    2. Atrial fibrillation with rapid ventricular response (CMS/HCC)    3. MRI safe cardiac pacemaker in situ      Chief Complaint   Patient presents with   • Chronic systolic heart failure (CMS/HCC)       History of Present Illness:   Nolvia Huff is a 60 y.o. female who presents to my electrophysiology clinic for follow up of complete heart block atrial fibrillation and permanent pacemaker.  She continues to have trouble with blood thinner.  She has nosebleeds regularly.  She also has having some memory difficulty she is thinks and forgets to take her Eliquis in the evening probably at least half the time.  She reminds me that she takes steroids for her COPD relatively frequently and has even more bleeding when this occurs.    She feels a lot better since having her AV node ablation and permanent pacemaker implantation.  Hers is a leadless cardiac pacemaker.  No chest pain nausea vomit fevers chills.      Review of Systems:   Constitutional: No fevers or chills, no recent weight gain or weight loss or fatigue  Eyes: No visual loss, blurred vision, double vision, yellow sclerae.  ENT: No headaches, hearing loss, vertigo, congestion or sore throat.   Cardiovascular: Per HPI  Respiratory: No cough or wheezing, no sputum production, no hematemesis   Gastrointestinal: No abdominal pain, no nausea, vomiting, constipation, diarrhea, melena.   Genitourinary: No dysuria, hematuria or increased frequency.  Musculoskeletal:  No gait disturbance, weakness or joint pain or  stiffness  Integumentary: No rashes, urticaria, ulcers or sores.   Neurological: No headache, dizziness, syncope, paralysis, ataxia, no prior CVA/TIA  Psychiatric: No anxiety, or depression  Endocrine: No diaphoresis, cold or heat intolerance. No polyuria or polydipsia.   Hematologic/Lymphatic: No anemia, abnormal bruising or bleeding. No history of DVT/PE.      Past Medical History:   Past Medical History:   Diagnosis Date   • Acute otitis media     H/o   • H/O chest x-ray 10/04/2011    Cardiac silhouute normal limits. The pulmonary vasculature appears normal, The diaphragms are flattened bilaterally which is sug of COPD. Lungs are grossly clear bilaterally with no prominent nodules or masses seen. No change when compared to previous exam from 11/10/09   • H/O chest x-ray 05/31/2011    Chronic change. No active disease   • H/O chest x-ray 11/10/2009    Moderate degenerative changes of thoracic spine. Cardiac silhoutte appears normal. There is slight hyperexpansion. There were a few scattered calcified granulomas. No acute appearing infiltrates noted. no real change compared to 10/20/08   • History of PFTs 07/06/2015    Severe OAD w/ dec in FEV from prior. no resp to BDRX. MVV reduced. FVC reduced; sugg restricitve pattern   • History of PFTs 10/21/2013    Mod severe OAD, FVC normal, no restriction, MVV reduced   • History of PFTs 06/19/2012    Severe OAD, FVC reduced, MVV dec   • Normal stillborn     Pt had stillborn infant       Past Surgical History:   Past Surgical History:   Procedure Laterality Date   • BREAST CYST ASPIRATION Right    • CARDIAC ELECTROPHYSIOLOGY PROCEDURE N/A 9/30/2020    Procedure: MICRA PACEMAKER IMPLANTATION + AVN;  Surgeon: Maximus Solomon DO;  Location: [facility] INVAS[facility] LOC[facility];  Service: Cardiology;  Laterality: N/A;   • HYSTERECTOMY     • TONSILLECTOMY         Family History:   Family History   Problem Relation Age of Onset   • Emphysema Mother    • Cancer Mother         laryngeal  carcinoma   • Heart murmur Father    • Cerebral palsy Sister    • Breast cancer Sister    • No Known Problems Son    • Cancer Maternal Grandfather         laryngeal carcinoma.   • Asthma Brother    • Heart murmur Brother    • Heart disease Maternal Grandmother    • No Known Problems Paternal Grandmother    • No Known Problems Paternal Grandfather    • Other Son          at birth       Social History:   Social History     Socioeconomic History   • Marital status:      Spouse name: Not on file   • Number of children: Not on file   • Years of education: Not on file   • Highest education level: Not on file   Tobacco Use   • Smoking status: Former Smoker     Packs/day: 2.00     Years: 30.00     Pack years: 60.00     Types: Cigarettes     Quit date:      Years since quittin.0   • Smokeless tobacco: Never Used   • Tobacco comment: fomer smoker 2 ppd x 30 years-Still smoke 1 cig, sporadically   Substance and Sexual Activity   • Alcohol use: Not Currently     Frequency: Monthly or less     Drinks per session: 1 or 2     Binge frequency: Never   • Drug use: Yes     Types: Marijuana     Comment: occasionally smoked marijuana   • Sexual activity: Defer   Social History Narrative    Caffeine: 1 cup coffee daily and some soda       Medications:     Current Outpatient Medications:   •  albuterol sulfate  (90 Base) MCG/ACT inhaler, Inhale 2 puffs Every 4 (Four) Hours As Needed for Wheezing., Disp: 18 g, Rfl: 11  •  apixaban (ELIQUIS) 5 MG tablet tablet, Take 1 tablet by mouth 2 (Two) Times a Day., Disp: 60 tablet, Rfl: 3  •  Fluticasone Furoate-Vilanterol (Breo Ellipta) 200-25 MCG/INH inhaler, Inhale 1 puff Daily., Disp: 60 each, Rfl: 6  •  guaiFENesin (MUCINEX) 600 MG 12 hr tablet, Take 1 tablet by mouth 2 (Two) Times a Day. (Patient taking differently: Take 600 mg by mouth Daily As Needed.), Disp: 60 tablet, Rfl: 6  •  ipratropium-albuterol (DUO-NEB) 0.5-2.5 mg/3 ml nebulizer, Take 3 mL by  "nebulization 4 (Four) Times a Day As Needed for Wheezing., Disp: 360 mL, Rfl: 3  •  montelukast (SINGULAIR) 10 MG tablet, Take 1 tablet by mouth Every Night. (Patient taking differently: Take 10 mg by mouth As Needed.), Disp: 30 tablet, Rfl: 11  •  omeprazole (priLOSEC) 40 MG capsule, Take 1 capsule by mouth Daily. (Patient taking differently: Take 40 mg by mouth As Needed.), Disp: 30 capsule, Rfl: 11  •  tiotropium (Spiriva HandiHaler) 18 MCG per inhalation capsule, Place 1 capsule into inhaler and inhale Daily., Disp: 90 capsule, Rfl: 3  No current facility-administered medications for this visit.     Allergies:   Allergies   Allergen Reactions   • Penicillins Anaphylaxis   • Tikosyn [Dofetilide] Other (See Comments)     Torsades       Objective   Vital Signs:   Vitals:    01/04/21 0955   BP: 112/68   BP Location: Left arm   Patient Position: Sitting   Pulse: 84   SpO2: 97%   Weight: 68.8 kg (151 lb 9.6 oz)   Height: 172.7 cm (68\")       PHYSICAL EXAM  General appearance: Awake, alert, cooperative  Head: Normocephalic, without obvious abnormality, atraumatic  Eyes: Conjunctivae/corneas clear, EOMs intact  Neck: no adenopathy, no carotid bruit, no JVD and thyroid: not enlarged  Lungs: clear to auscultation bilaterally and no rhonchi or crackles\", ' symmetric  Heart: regular rate and rhythm, S1, S2 normal, no murmur, click, rub or gallop  Abdomen: Soft, non-tender, bowel sounds normal,  no organomegaly  Extremities: extremities normal, atraumatic, no cyanosis or edema  Skin: Skin color, turgor normal, no rashes or lesions  Neurologic: Grossly normal     Lab Results   Component Value Date    GLUCOSE 90 10/01/2020    CALCIUM 8.7 10/01/2020     10/01/2020    K 4.3 10/01/2020    CO2 28.0 10/01/2020     10/01/2020    BUN 16 10/01/2020    CREATININE 0.58 10/01/2020    EGFRIFNONA 105 10/01/2020    BCR 27.6 (H) 10/01/2020    ANIONGAP 8.0 10/01/2020     Lab Results   Component Value Date    WBC 10.76 09/29/2020 "    HGB 14.0 09/29/2020    HCT 44.8 09/29/2020    MCV 96.1 09/29/2020     09/29/2020     Lab Results   Component Value Date    INR 1.14 09/21/2020    PROTIME 14.3 (H) 09/21/2020     Lab Results   Component Value Date    TSH 5.430 (H) 09/21/2020       Cardiac Testing:     I personally viewed and interpreted the patient's EKG/Telemetry/lab data    Procedures    Tobacco Cessation: N/A  Obstructive Sleep Apnea Screening: N/A    Assessment & Plan    Diagnoses and all orders for this visit:    1. Complete heart block (CMS/HCC) (Primary)    2. Atrial fibrillation with rapid ventricular response (CMS/HCC)    3. MRI safe cardiac pacemaker in situ         Diagnosis Plan   1. Complete heart block (CMS/HCC)   AV node ablation.  Leadless cardiac pacemaker in situ.  Complete resolution of symptoms of A. fib since having this done.   2. Atrial fibrillation with rapid ventricular response (CMS/HCC)   much improved symptoms of atrial fibrillation after AV node ablation.  Appropriate anticoagulation due to [id number] 3.  She is having significant difficulty with anticoagulation.  She is a good candidate for anticoagulation in the short-term but is a poor candidate for follow long-term anticoagulation due to her difficulty with memory impairment remembering to take her anticoagulation and also difficulty with recurrent significant nosebleeds.  We discussed the option of left atrial appendage occlusion.  We discussed specifically the watchman device.  She is interested in treating.  We will have our watchman coordinator contact her next week.  I think she would be a good candidate for this.   3. MRI safe cardiac pacemaker in situ   normal device interrogation.  Lower rate limit reduced to 60 pulses per minute.  Complete heart block is the underlying rhythm.  Adequate pacing threshold is noted.  Patient has no underlying rhythm today.       I spent 37 minutes in consultation with this patient which included more than 65% of this  time in direct face-to-face counseling, physical examination and discussion of my assessment and findings and shared decision making with the patient.    Follow Up:     Thank you for allowing me to participate in the care of your patient. Please to not hesitate to contact me with additional questions or concerns.      Maximus Solomon DO, FACC, RS  Cardiac Electrophysiologist  [facility] / [facility]

## 2021-01-13 ENCOUNTER — PREP FOR SURGERY (OUTPATIENT)
Dept: OTHER | Facility: HOSPITAL | Age: 61
End: 2021-01-13

## 2021-01-13 DIAGNOSIS — I48.20 ATRIAL FIBRILLATION, CHRONIC (HCC): ICD-10-CM

## 2021-01-13 DIAGNOSIS — I48.0 PAROXYSMAL ATRIAL FIBRILLATION (HCC): Primary | ICD-10-CM

## 2021-01-13 DIAGNOSIS — I48.91 ATRIAL FIBRILLATION WITH RAPID VENTRICULAR RESPONSE (HCC): Primary | ICD-10-CM

## 2021-01-13 RX ORDER — ACETAMINOPHEN 325 MG/1
650 TABLET ORAL EVERY 4 HOURS PRN
Status: CANCELLED | OUTPATIENT
Start: 2021-01-13

## 2021-01-13 RX ORDER — SODIUM CHLORIDE 0.9 % (FLUSH) 0.9 %
3 SYRINGE (ML) INJECTION EVERY 12 HOURS SCHEDULED
Status: CANCELLED | OUTPATIENT
Start: 2021-01-13

## 2021-01-13 RX ORDER — NITROGLYCERIN 0.4 MG/1
0.4 TABLET SUBLINGUAL
Status: CANCELLED | OUTPATIENT
Start: 2021-01-13

## 2021-01-13 RX ORDER — CLINDAMYCIN PHOSPHATE 900 MG/50ML
900 INJECTION INTRAVENOUS
Status: CANCELLED | OUTPATIENT
Start: 2021-01-13

## 2021-01-13 RX ORDER — SODIUM CHLORIDE 0.9 % (FLUSH) 0.9 %
1-10 SYRINGE (ML) INJECTION AS NEEDED
Status: CANCELLED | OUTPATIENT
Start: 2021-01-13

## 2021-01-25 ENCOUNTER — OFFICE VISIT (OUTPATIENT)
Dept: INTERNAL MEDICINE | Facility: CLINIC | Age: 61
End: 2021-01-25

## 2021-01-25 ENCOUNTER — TELEPHONE (OUTPATIENT)
Dept: INTERNAL MEDICINE | Facility: CLINIC | Age: 61
End: 2021-01-25

## 2021-01-25 VITALS
OXYGEN SATURATION: 96 % | DIASTOLIC BLOOD PRESSURE: 70 MMHG | HEART RATE: 82 BPM | TEMPERATURE: 96.9 F | SYSTOLIC BLOOD PRESSURE: 110 MMHG | BODY MASS INDEX: 22.71 KG/M2 | RESPIRATION RATE: 20 BRPM | WEIGHT: 149.38 LBS

## 2021-01-25 DIAGNOSIS — J43.2 CENTRILOBULAR EMPHYSEMA (HCC): ICD-10-CM

## 2021-01-25 DIAGNOSIS — I50.22 CHRONIC SYSTOLIC HEART FAILURE (HCC): ICD-10-CM

## 2021-01-25 DIAGNOSIS — J96.21 ACUTE ON CHRONIC RESPIRATORY FAILURE WITH HYPOXIA AND HYPERCAPNIA (HCC): ICD-10-CM

## 2021-01-25 DIAGNOSIS — I48.91 ATRIAL FIBRILLATION WITH RAPID VENTRICULAR RESPONSE (HCC): Primary | ICD-10-CM

## 2021-01-25 DIAGNOSIS — J96.22 ACUTE ON CHRONIC RESPIRATORY FAILURE WITH HYPOXIA AND HYPERCAPNIA (HCC): ICD-10-CM

## 2021-01-25 PROCEDURE — 99214 OFFICE O/P EST MOD 30 MIN: CPT | Performed by: INTERNAL MEDICINE

## 2021-01-25 NOTE — PROGRESS NOTES
Subjective   Nolvia Huff is a 60 y.o. female.     History of Present Illness     The following portions of the patient's history were reviewed and updated as appropriate: allergies, current medications, past family history, past medical history, past social history, past surgical history and problem list.    Cardiac- patient is scheduled for a watchman placement in the left atrium.  Patient is here for considerations for the watchman device cardiac implantable.  Patient currently has atrial fibrillation which is controlled but she does have to take chronic anticoagulation.  Patient says that she has difficulty with remembering to take her anticoagulation medication and when she does she has been having side effects such as nosebleeds and bruising.  She really wants to consider the watchman device as a way of decreasing her risk for stroke and DVT.  Patient has received the paperwork explaining the details of the procedure and risks involved and she would like to move forward with receiving this device.    HCC see below    Review of Systems   All other systems reviewed and are negative.      Objective   Physical Exam  Vitals signs and nursing note reviewed.   Constitutional:       Appearance: Normal appearance. She is normal weight.   HENT:      Head: Normocephalic and atraumatic.      Nose: Nose normal.      Mouth/Throat:      Mouth: Mucous membranes are moist.   Eyes:      Pupils: Pupils are equal, round, and reactive to light.   Cardiovascular:      Rate and Rhythm: Normal rate.      Pulses: Normal pulses.      Heart sounds: Normal heart sounds.   Pulmonary:      Effort: Pulmonary effort is normal.      Breath sounds: Normal breath sounds.   Abdominal:      General: Bowel sounds are normal.      Palpations: Abdomen is soft.   Neurological:      Mental Status: She is alert.           Assessment/Plan   Diagnoses and all orders for this visit:    1. Atrial fibrillation with rapid ventricular response (CMS/HCC)  (Primary)-chronic and controlled.  Considerations for watchman device implant Tatian.    2. Centrilobular emphysema (CMS/HCC)-chronic and controlled    3. Chronic systolic heart failure (CMS/HCC)-chronic and controlled    4. Acute on chronic respiratory failure with hypoxia and hypercapnia (CMS/HCC)-chronic currently resolved.

## 2021-01-25 NOTE — TELEPHONE ENCOUNTER
----- Message from Alfonso Denney MD sent at 1/25/2021 12:38 PM EST -----  Regarding: Letter to cardiology for support for watchman device implant  joanna Bailon has been written and ready for fax to cardiology for support of watchman device

## 2021-02-02 ENCOUNTER — APPOINTMENT (OUTPATIENT)
Dept: CT IMAGING | Facility: HOSPITAL | Age: 61
End: 2021-02-02

## 2021-02-04 RX ORDER — APIXABAN 5 MG/1
TABLET, FILM COATED ORAL
Qty: 60 TABLET | Refills: 3 | Status: SHIPPED | OUTPATIENT
Start: 2021-02-04 | End: 2021-03-30 | Stop reason: HOSPADM

## 2021-02-05 ENCOUNTER — HOSPITAL ENCOUNTER (OUTPATIENT)
Dept: CT IMAGING | Facility: HOSPITAL | Age: 61
Discharge: HOME | End: 2021-02-05

## 2021-02-05 ENCOUNTER — APPOINTMENT (OUTPATIENT)
Dept: PREADMISSION TESTING | Facility: HOSPITAL | Age: 61
End: 2021-02-05

## 2021-02-05 DIAGNOSIS — I48.91 ATRIAL FIBRILLATION WITH RAPID VENTRICULAR RESPONSE (HCC): ICD-10-CM

## 2021-02-05 DIAGNOSIS — I48.20 ATRIAL FIBRILLATION, CHRONIC (HCC): ICD-10-CM

## 2021-02-05 LAB
ANION GAP SERPL CALCULATED.3IONS-SCNC: 8 MMOL/L (ref 5–15)
BUN SERPL-MCNC: 9 MG/DL (ref 8–23)
BUN/CREAT SERPL: 13.2 (ref 7–25)
CALCIUM SPEC-SCNC: 9.5 MG/DL (ref 8.6–10.5)
CHLORIDE SERPL-SCNC: 103 MMOL/L (ref 98–107)
CO2 SERPL-SCNC: 28 MMOL/L (ref 22–29)
CREAT SERPL-MCNC: 0.68 MG/DL (ref 0.57–1)
DEPRECATED RDW RBC AUTO: 40.7 FL (ref 37–54)
ERYTHROCYTE [DISTWIDTH] IN BLOOD BY AUTOMATED COUNT: 12.5 % (ref 12.3–15.4)
GFR SERPL CREATININE-BSD FRML MDRD: 87 ML/MIN/1.73
GLUCOSE SERPL-MCNC: 116 MG/DL (ref 65–99)
HCT VFR BLD AUTO: 47.1 % (ref 34–46.6)
HGB BLD-MCNC: 15.4 G/DL (ref 12–15.9)
MCH RBC QN AUTO: 29.2 PG (ref 26.6–33)
MCHC RBC AUTO-ENTMCNC: 32.7 G/DL (ref 31.5–35.7)
MCV RBC AUTO: 89.4 FL (ref 79–97)
PLATELET # BLD AUTO: 287 10*3/MM3 (ref 140–450)
PMV BLD AUTO: 10.1 FL (ref 6–12)
POTASSIUM SERPL-SCNC: 4.5 MMOL/L (ref 3.5–5.2)
RBC # BLD AUTO: 5.27 10*6/MM3 (ref 3.77–5.28)
SODIUM SERPL-SCNC: 139 MMOL/L (ref 136–145)
WBC # BLD AUTO: 7.28 10*3/MM3 (ref 3.4–10.8)

## 2021-02-05 PROCEDURE — 85027 COMPLETE CBC AUTOMATED: CPT

## 2021-02-05 PROCEDURE — 0 IOPAMIDOL PER 1 ML: Performed by: INTERNAL MEDICINE

## 2021-02-05 PROCEDURE — 71275 CT ANGIOGRAPHY CHEST: CPT

## 2021-02-05 PROCEDURE — 36415 COLL VENOUS BLD VENIPUNCTURE: CPT

## 2021-02-05 PROCEDURE — 80048 BASIC METABOLIC PNL TOTAL CA: CPT

## 2021-02-05 RX ADMIN — IOPAMIDOL 100 ML: 755 INJECTION, SOLUTION INTRAVENOUS at 10:13

## 2021-02-05 NOTE — PAT
Patient to apply Chlorhexadine wipes  to surgical area (as instructed) the night before procedure and the AM of procedure. Wipes provided.    Patient will hold 2 doses of eliquis    CT after PAT

## 2021-02-05 NOTE — DISCHARGE INSTRUCTIONS

## 2021-02-07 ENCOUNTER — APPOINTMENT (OUTPATIENT)
Dept: PREADMISSION TESTING | Facility: HOSPITAL | Age: 61
End: 2021-02-07

## 2021-02-07 LAB — SARS-COV-2 RNA RESP QL NAA+PROBE: NOT DETECTED

## 2021-02-07 PROCEDURE — U0004 COV-19 TEST NON-CDC HGH THRU: HCPCS

## 2021-02-07 PROCEDURE — C9803 HOPD COVID-19 SPEC COLLECT: HCPCS

## 2021-02-08 ENCOUNTER — ANESTHESIA EVENT (OUTPATIENT)
Dept: CARDIOLOGY | Facility: HOSPITAL | Age: 61
End: 2021-02-08

## 2021-02-08 RX ORDER — ASPIRIN 81 MG/1
81 TABLET ORAL DAILY
Qty: 30 TABLET | Refills: 11 | Status: SHIPPED | OUTPATIENT
Start: 2021-02-08 | End: 2022-02-25 | Stop reason: SDUPTHER

## 2021-02-08 NOTE — NURSING NOTE
PRE-WATCHMAN HISTORY  Nolvia Huff 1960   [address] [id number] [phone] (home)     Information obtained from: [x] Medical record review  [x] Patient report  Scheduled for: Watchman implant on 2/9/21 with Dr Solomon  [facility] Visit: Dr Denney     History & Risk Factors (prior to Watchman implant)  [id number] Risk Factors:  Congestive HF     [] No   [x] Yes  LV Dysfunction   [x] No   [] Yes  Hypertension      [x] No   [] Yes  Diabetes    [x] No   [] Yes  Stroke       [x] No   [] Yes  TIA       [x] No   [] Yes  Thromboembolism    [x] No   [] Yes  Vascular Disease   [] No   [x] Yes       If Yes, Type   [] Prior MI  [] PAD      [x]  atheroma of the aorta      HAS-BLED Risk Factors:  HTN (uncontrolled) [x] No  [] Yes  Abnormal Renal Fxn  [x] No  [] Yes  Abnormal Liver Fxn   [x] No  [] Yes   Stroke            [x] No  [] Yes   Bleeding event  [] No  [x] Yes  Labile INR      [x] No  [] Yes  Alcohol              [x] No  [] Yes  Antiplatelets      [x] No  [] Yes  NSAIDS  [x] No  [] Yes    Additional Stroke & Bleeding Risk Factors:  Increased Fall Risk   [x] No  [] Yes  Bleeding Event         [] No  [x] Yes and noncompliance       If Yes, Type      [] Intracranial [] Epistaxis   [] GI   [x] Nose    Rhythm History:  AFIBTYPE: paroxysmal    Valvular AFib        [x] No  [] Yes    Attempt at AFib Termination:  [] No  [x] Yes       If Yes, pharmacologic CV    [] No  [] Yes       If Yes, DC cardioversion  [] No  [x] Yes       If Yes, catheter ablation  [] No  [x] Yes            If Yes, Date of most recent: 9/2020              If Yes, surgical ablation  [x] No  [] Yes           If Yes, Date of most recent: AV node             AFTAB Intervention    [x] No  [] Yes    Additional History & Risk Factors:  Cardiomyopathy   [x] No  [] Yes  Chronic Lung Disease  [] No  [x] Yes  Coronary Artery Disease  [x] No  [] Yes  Sleep Apnea    [x] No  [] Yes       If Yes, compliant with treatment [] No  []  Yes    Epicardial Approach Considered [x] No  [] Yes    Diagnostic Studies:  Last Echo:  [x] TTE Date: 9/21/20          [x] IRMA Date: 9/23/20        EF: 26-30%     EF: 45 %         LA:  4.1cm             VHD mod TR      VHD Moderate patent foramen ovale present with right to left shunting indicated by saline contrast.     Pre-procedure blood thinner medications:  Eliquis 5 mg bid, ASA 81 mg     Procedure information:    AFTAB Orifice Maximal Width: 22  mm  Cumulative Air Kerma:  107 mGy  Contrast Volume:  50 mL  Dose Area Product: 537.23 ?Gy-M2     02/08/21 09:10 SHANNON Ash RN

## 2021-02-09 ENCOUNTER — HOSPITAL ENCOUNTER (OUTPATIENT)
Dept: CARDIOLOGY | Facility: HOSPITAL | Age: 61
Discharge: HOME | End: 2021-02-09

## 2021-02-09 ENCOUNTER — ANESTHESIA (OUTPATIENT)
Dept: CARDIOLOGY | Facility: HOSPITAL | Age: 61
End: 2021-02-09

## 2021-02-09 ENCOUNTER — HOSPITAL ENCOUNTER (INPATIENT)
Facility: HOSPITAL | Age: 61
LOS: 1 days | Discharge: HOME | End: 2021-02-10
Attending: INTERNAL MEDICINE | Admitting: INTERNAL MEDICINE

## 2021-02-09 VITALS — BODY MASS INDEX: 23.39 KG/M2 | WEIGHT: 149 LBS | HEIGHT: 67 IN

## 2021-02-09 DIAGNOSIS — I48.91 ATRIAL FIBRILLATION WITH RAPID VENTRICULAR RESPONSE (HCC): ICD-10-CM

## 2021-02-09 DIAGNOSIS — Z95.818 PRESENCE OF WATCHMAN LEFT ATRIAL APPENDAGE CLOSURE DEVICE: Primary | ICD-10-CM

## 2021-02-09 DIAGNOSIS — I48.20 ATRIAL FIBRILLATION, CHRONIC (HCC): ICD-10-CM

## 2021-02-09 LAB
ACT BLD: 285 SECONDS (ref 82–152)
ACT BLD: 384 SECONDS (ref 82–152)
BH CV ECHO MEAS - RAP SYSTOLE: 8 MMHG
BH CV ECHO MEAS - RVSP: 24 MMHG
BH CV ECHO MEAS - TR MAX PG: 16 MMHG
BH CV ECHO MEAS - TR MAX VEL: 200 CM/SEC

## 2021-02-09 PROCEDURE — 85347 COAGULATION TIME ACTIVATED: CPT

## 2021-02-09 PROCEDURE — 25010000002 PHENYLEPHRINE 10 MG/ML SOLUTION 1 ML VIAL: Performed by: NURSE ANESTHETIST, CERTIFIED REGISTERED

## 2021-02-09 PROCEDURE — 25010000002 FENTANYL CITRATE (PF) 100 MCG/2ML SOLUTION: Performed by: NURSE ANESTHETIST, CERTIFIED REGISTERED

## 2021-02-09 PROCEDURE — 93799 UNLISTED CV SVC/PROCEDURE: CPT | Performed by: INTERNAL MEDICINE

## 2021-02-09 PROCEDURE — 25010000002 PROPOFOL 10 MG/ML EMULSION: Performed by: NURSE ANESTHETIST, CERTIFIED REGISTERED

## 2021-02-09 PROCEDURE — 0 IOPAMIDOL PER 1 ML: Performed by: INTERNAL MEDICINE

## 2021-02-09 PROCEDURE — 25010000002 HEPARIN (PORCINE) PER 1000 UNITS: Performed by: INTERNAL MEDICINE

## 2021-02-09 PROCEDURE — 25010000003 LIDOCAINE 1 % SOLUTION: Performed by: INTERNAL MEDICINE

## 2021-02-09 PROCEDURE — 33340 PERQ CLSR TCAT L ATR APNDGE: CPT | Performed by: INTERNAL MEDICINE

## 2021-02-09 PROCEDURE — C1760 CLOSURE DEV, VASC: HCPCS | Performed by: INTERNAL MEDICINE

## 2021-02-09 PROCEDURE — B246ZZ4 ULTRASONOGRAPHY OF RIGHT AND LEFT HEART, TRANSESOPHAGEAL: ICD-10-PCS | Performed by: INTERNAL MEDICINE

## 2021-02-09 PROCEDURE — 02L73DK OCCLUSION OF LEFT ATRIAL APPENDAGE WITH INTRALUMINAL DEVICE, PERCUTANEOUS APPROACH: ICD-10-PCS | Performed by: INTERNAL MEDICINE

## 2021-02-09 PROCEDURE — C1894 INTRO/SHEATH, NON-LASER: HCPCS | Performed by: INTERNAL MEDICINE

## 2021-02-09 PROCEDURE — 25010000002 DEXAMETHASONE PER 1 MG: Performed by: NURSE ANESTHETIST, CERTIFIED REGISTERED

## 2021-02-09 PROCEDURE — C1769 GUIDE WIRE: HCPCS | Performed by: INTERNAL MEDICINE

## 2021-02-09 PROCEDURE — C1893 INTRO/SHEATH, FIXED,NON-PEEL: HCPCS | Performed by: INTERNAL MEDICINE

## 2021-02-09 PROCEDURE — 93355 ECHO TRANSESOPHAGEAL (TEE): CPT | Performed by: INTERNAL MEDICINE

## 2021-02-09 PROCEDURE — 25010000002 HYDROMORPHONE PER 4 MG: Performed by: NURSE ANESTHETIST, CERTIFIED REGISTERED

## 2021-02-09 PROCEDURE — 25010000002 ONDANSETRON PER 1 MG: Performed by: NURSE ANESTHETIST, CERTIFIED REGISTERED

## 2021-02-09 PROCEDURE — 25010000002 NEOSTIGMINE 10 MG/10ML SOLUTION: Performed by: NURSE ANESTHETIST, CERTIFIED REGISTERED

## 2021-02-09 PROCEDURE — 93005 ELECTROCARDIOGRAM TRACING: CPT | Performed by: ANESTHESIOLOGY

## 2021-02-09 PROCEDURE — 93355 ECHO TRANSESOPHAGEAL (TEE): CPT

## 2021-02-09 PROCEDURE — S0260 H&P FOR SURGERY: HCPCS | Performed by: NURSE PRACTITIONER

## 2021-02-09 PROCEDURE — 25010000002 PROTAMINE SULFATE PER 10 MG: Performed by: NURSE ANESTHETIST, CERTIFIED REGISTERED

## 2021-02-09 DEVICE — LEFT ATRIAL APPENDAGE CLOSURE DEVICE WITH DELIVERY SYSTEM
Type: IMPLANTABLE DEVICE | Status: FUNCTIONAL
Brand: WATCHMAN FLX™

## 2021-02-09 RX ORDER — SODIUM CHLORIDE 0.9 % (FLUSH) 0.9 %
1-10 SYRINGE (ML) INJECTION AS NEEDED
Status: DISCONTINUED | OUTPATIENT
Start: 2021-02-09 | End: 2021-02-09 | Stop reason: HOSPADM

## 2021-02-09 RX ORDER — FAMOTIDINE 10 MG/ML
20 INJECTION, SOLUTION INTRAVENOUS ONCE
Status: COMPLETED | OUTPATIENT
Start: 2021-02-09 | End: 2021-02-09

## 2021-02-09 RX ORDER — FAMOTIDINE 20 MG/1
20 TABLET, FILM COATED ORAL ONCE
Status: DISCONTINUED | OUTPATIENT
Start: 2021-02-09 | End: 2021-02-10 | Stop reason: HOSPADM

## 2021-02-09 RX ORDER — LIDOCAINE HYDROCHLORIDE 20 MG/ML
INJECTION, SOLUTION INFILTRATION; PERINEURAL AS NEEDED
Status: DISCONTINUED | OUTPATIENT
Start: 2021-02-09 | End: 2021-02-09 | Stop reason: SURG

## 2021-02-09 RX ORDER — SODIUM CHLORIDE 0.9 % (FLUSH) 0.9 %
10 SYRINGE (ML) INJECTION EVERY 12 HOURS SCHEDULED
Status: DISCONTINUED | OUTPATIENT
Start: 2021-02-09 | End: 2021-02-10 | Stop reason: HOSPADM

## 2021-02-09 RX ORDER — PROPOFOL 10 MG/ML
VIAL (ML) INTRAVENOUS AS NEEDED
Status: DISCONTINUED | OUTPATIENT
Start: 2021-02-09 | End: 2021-02-09 | Stop reason: SURG

## 2021-02-09 RX ORDER — SODIUM CHLORIDE, SODIUM LACTATE, POTASSIUM CHLORIDE, CALCIUM CHLORIDE 600; 310; 30; 20 MG/100ML; MG/100ML; MG/100ML; MG/100ML
9 INJECTION, SOLUTION INTRAVENOUS CONTINUOUS
Status: DISCONTINUED | OUTPATIENT
Start: 2021-02-09 | End: 2021-02-10 | Stop reason: HOSPADM

## 2021-02-09 RX ORDER — GLYCOPYRROLATE 0.2 MG/ML
INJECTION INTRAMUSCULAR; INTRAVENOUS AS NEEDED
Status: DISCONTINUED | OUTPATIENT
Start: 2021-02-09 | End: 2021-02-09 | Stop reason: SURG

## 2021-02-09 RX ORDER — HYDROMORPHONE HYDROCHLORIDE 1 MG/ML
0.5 INJECTION, SOLUTION INTRAMUSCULAR; INTRAVENOUS; SUBCUTANEOUS
Status: DISCONTINUED | OUTPATIENT
Start: 2021-02-09 | End: 2021-02-09 | Stop reason: HOSPADM

## 2021-02-09 RX ORDER — ONDANSETRON 2 MG/ML
INJECTION INTRAMUSCULAR; INTRAVENOUS AS NEEDED
Status: DISCONTINUED | OUTPATIENT
Start: 2021-02-09 | End: 2021-02-09 | Stop reason: SURG

## 2021-02-09 RX ORDER — ROCURONIUM BROMIDE 10 MG/ML
INJECTION, SOLUTION INTRAVENOUS AS NEEDED
Status: DISCONTINUED | OUTPATIENT
Start: 2021-02-09 | End: 2021-02-09 | Stop reason: SURG

## 2021-02-09 RX ORDER — FENTANYL CITRATE 50 UG/ML
50 INJECTION, SOLUTION INTRAMUSCULAR; INTRAVENOUS
Status: DISCONTINUED | OUTPATIENT
Start: 2021-02-09 | End: 2021-02-09 | Stop reason: HOSPADM

## 2021-02-09 RX ORDER — DEXAMETHASONE SODIUM PHOSPHATE 4 MG/ML
8 INJECTION, SOLUTION INTRA-ARTICULAR; INTRALESIONAL; INTRAMUSCULAR; INTRAVENOUS; SOFT TISSUE ONCE AS NEEDED
Status: DISCONTINUED | OUTPATIENT
Start: 2021-02-09 | End: 2021-02-09 | Stop reason: HOSPADM

## 2021-02-09 RX ORDER — NITROGLYCERIN 0.4 MG/1
0.4 TABLET SUBLINGUAL
Status: DISCONTINUED | OUTPATIENT
Start: 2021-02-09 | End: 2021-02-09 | Stop reason: HOSPADM

## 2021-02-09 RX ORDER — SODIUM CHLORIDE 0.9 % (FLUSH) 0.9 %
10 SYRINGE (ML) INJECTION AS NEEDED
Status: DISCONTINUED | OUTPATIENT
Start: 2021-02-09 | End: 2021-02-10 | Stop reason: HOSPADM

## 2021-02-09 RX ORDER — LIDOCAINE HYDROCHLORIDE 10 MG/ML
0.5 INJECTION, SOLUTION EPIDURAL; INFILTRATION; INTRACAUDAL; PERINEURAL ONCE AS NEEDED
Status: DISCONTINUED | OUTPATIENT
Start: 2021-02-09 | End: 2021-02-10 | Stop reason: HOSPADM

## 2021-02-09 RX ORDER — SODIUM CHLORIDE 9 MG/ML
INJECTION, SOLUTION INTRAVENOUS CONTINUOUS PRN
Status: DISCONTINUED | OUTPATIENT
Start: 2021-02-09 | End: 2021-02-09 | Stop reason: SURG

## 2021-02-09 RX ORDER — LIDOCAINE HYDROCHLORIDE 10 MG/ML
INJECTION, SOLUTION INFILTRATION; PERINEURAL AS NEEDED
Status: DISCONTINUED | OUTPATIENT
Start: 2021-02-09 | End: 2021-02-09 | Stop reason: HOSPADM

## 2021-02-09 RX ORDER — ONDANSETRON 2 MG/ML
4 INJECTION INTRAMUSCULAR; INTRAVENOUS ONCE AS NEEDED
Status: DISCONTINUED | OUTPATIENT
Start: 2021-02-09 | End: 2021-02-09 | Stop reason: HOSPADM

## 2021-02-09 RX ORDER — ASPIRIN 81 MG/1
81 TABLET ORAL DAILY
Status: DISCONTINUED | OUTPATIENT
Start: 2021-02-09 | End: 2021-02-10 | Stop reason: HOSPADM

## 2021-02-09 RX ORDER — HEPARIN SODIUM 10000 [USP'U]/100ML
INJECTION, SOLUTION INTRAVENOUS CONTINUOUS PRN
Status: DISCONTINUED | OUTPATIENT
Start: 2021-02-09 | End: 2021-02-09 | Stop reason: HOSPADM

## 2021-02-09 RX ORDER — PROTAMINE SULFATE 10 MG/ML
INJECTION, SOLUTION INTRAVENOUS AS NEEDED
Status: DISCONTINUED | OUTPATIENT
Start: 2021-02-09 | End: 2021-02-09 | Stop reason: SURG

## 2021-02-09 RX ORDER — DEXAMETHASONE SODIUM PHOSPHATE 10 MG/ML
INJECTION INTRAMUSCULAR; INTRAVENOUS AS NEEDED
Status: DISCONTINUED | OUTPATIENT
Start: 2021-02-09 | End: 2021-02-09 | Stop reason: SURG

## 2021-02-09 RX ORDER — NEOSTIGMINE METHYLSULFATE 1 MG/ML
INJECTION, SOLUTION INTRAVENOUS AS NEEDED
Status: DISCONTINUED | OUTPATIENT
Start: 2021-02-09 | End: 2021-02-09 | Stop reason: SURG

## 2021-02-09 RX ORDER — HEPARIN SODIUM 1000 [USP'U]/ML
INJECTION, SOLUTION INTRAVENOUS; SUBCUTANEOUS AS NEEDED
Status: DISCONTINUED | OUTPATIENT
Start: 2021-02-09 | End: 2021-02-09 | Stop reason: HOSPADM

## 2021-02-09 RX ORDER — SODIUM CHLORIDE 0.9 % (FLUSH) 0.9 %
3 SYRINGE (ML) INJECTION EVERY 12 HOURS SCHEDULED
Status: DISCONTINUED | OUTPATIENT
Start: 2021-02-09 | End: 2021-02-09 | Stop reason: HOSPADM

## 2021-02-09 RX ORDER — CLINDAMYCIN PHOSPHATE 900 MG/50ML
900 INJECTION INTRAVENOUS
Status: COMPLETED | OUTPATIENT
Start: 2021-02-09 | End: 2021-02-09

## 2021-02-09 RX ORDER — ACETAMINOPHEN 325 MG/1
650 TABLET ORAL EVERY 4 HOURS PRN
Status: DISCONTINUED | OUTPATIENT
Start: 2021-02-09 | End: 2021-02-09 | Stop reason: HOSPADM

## 2021-02-09 RX ADMIN — FENTANYL CITRATE 50 MCG: 50 INJECTION, SOLUTION INTRAMUSCULAR; INTRAVENOUS at 14:00

## 2021-02-09 RX ADMIN — DEXAMETHASONE SODIUM PHOSPHATE 4 MG: 10 INJECTION INTRAMUSCULAR; INTRAVENOUS at 11:03

## 2021-02-09 RX ADMIN — PHENYLEPHRINE HYDROCHLORIDE 1 MCG/KG/MIN: 10 INJECTION INTRAVENOUS at 11:01

## 2021-02-09 RX ADMIN — PROPOFOL 125 MG: 10 INJECTION, EMULSION INTRAVENOUS at 10:53

## 2021-02-09 RX ADMIN — CLINDAMYCIN PHOSPHATE 900 MG: 900 INJECTION, SOLUTION INTRAVENOUS at 10:45

## 2021-02-09 RX ADMIN — SODIUM CHLORIDE, PRESERVATIVE FREE 10 ML: 5 INJECTION INTRAVENOUS at 20:20

## 2021-02-09 RX ADMIN — FAMOTIDINE 20 MG: 10 INJECTION, SOLUTION INTRAVENOUS at 09:09

## 2021-02-09 RX ADMIN — SODIUM CHLORIDE: 9 INJECTION, SOLUTION INTRAVENOUS at 10:39

## 2021-02-09 RX ADMIN — ROCURONIUM BROMIDE 40 MG: 10 INJECTION INTRAVENOUS at 10:53

## 2021-02-09 RX ADMIN — FENTANYL CITRATE 50 MCG: 50 INJECTION, SOLUTION INTRAMUSCULAR; INTRAVENOUS at 14:21

## 2021-02-09 RX ADMIN — ASPIRIN 81 MG: 81 TABLET, COATED ORAL at 20:20

## 2021-02-09 RX ADMIN — LIDOCAINE HYDROCHLORIDE 25 MG: 20 INJECTION, SOLUTION INFILTRATION; PERINEURAL at 10:53

## 2021-02-09 RX ADMIN — GLYCOPYRROLATE 0.4 MG: 0.4 INJECTION INTRAMUSCULAR; INTRAVENOUS at 12:05

## 2021-02-09 RX ADMIN — NEOSTIGMINE METHYLSULFATE 2.5 MG: 1 INJECTION, SOLUTION INTRAVENOUS at 12:05

## 2021-02-09 RX ADMIN — HYDROMORPHONE HYDROCHLORIDE 0.5 MG: 1 INJECTION, SOLUTION INTRAMUSCULAR; INTRAVENOUS; SUBCUTANEOUS at 14:45

## 2021-02-09 RX ADMIN — PROTAMINE SULFATE 100 MG: 10 INJECTION, SOLUTION INTRAVENOUS at 11:58

## 2021-02-09 RX ADMIN — APIXABAN 5 MG: 5 TABLET, FILM COATED ORAL at 20:20

## 2021-02-09 RX ADMIN — SUGAMMADEX 200 MG: 100 INJECTION, SOLUTION INTRAVENOUS at 12:16

## 2021-02-09 RX ADMIN — ONDANSETRON 4 MG: 2 INJECTION INTRAMUSCULAR; INTRAVENOUS at 11:56

## 2021-02-09 NOTE — ANESTHESIA PROCEDURE NOTES
Arterial Line      Patient reassessed immediately prior to procedure    Patient location during procedure: pre-op  Stop Time:2/9/2021 9:12 AM       Line placed for hemodynamic monitoring.  Performed By   Anesthesiologist: Onelia Epstein DO  Preanesthetic Checklist  Completed: patient identified, site marked, surgical consent, pre-op evaluation, timeout performed, IV checked, risks and benefits discussed and monitors and equipment checked  Arterial Line Prep   Sterile Tech: cap, gloves and sterile barriers  Prep: ChloraPrep  Patient monitoring: blood pressure monitoring, continuous pulse oximetry and EKG  Arterial Line Procedure   Laterality:right  Location:  radial artery  Catheter size: 20 G   Guidance: palpation technique  Number of attempts: 3.  Successful placement: yes  Post Assessment   Dressing Type: line sutured, occlusive dressing applied, secured with tape and wrist guard applied.   Complications no  Circ/Move/Sens Assessment: normal and unchanged.   Patient Tolerance: patient tolerated the procedure well with no apparent complications  Additional Notes  RRAL placement with sterile technique.

## 2021-02-09 NOTE — ANESTHESIA PREPROCEDURE EVALUATION
Anesthesia Evaluation     Patient summary reviewed and Nursing notes reviewed   no history of anesthetic complications:  NPO Solid Status: > 8 hours  NPO Liquid Status: > 8 hours           Airway   Mallampati: II  TM distance: >3 FB  Neck ROM: full  No difficulty expected  Dental    (+) edentulous    Pulmonary - normal exam   (+) COPD,   Cardiovascular - normal exam  Exercise tolerance: good (4-7 METS)    ECG reviewed    (+) pacemaker, dysrhythmias Atrial Fib, CHF Systolic <55%,     ROS comment: 9/2020-IRMA - EF = 45%. Mod severe LA dilation. Severe RA dilation. No AFTAB thrombus.  RV mild to mod dilatin  · Left atrial cavity size is moderate to severely dilated.  · Right atrial cavity size is severely dilated.mild RVSF reduction.  Mod PFO with R to L shunt.      TTE 9/21/20-·LVEF 26 - 30%. Mod dilated. Small PFO with R to L shunt.  Mod reduced RVSF. Mod TR.   RVSP moderately elevated (45-55 mmHg).    Neuro/Psych  (+) psychiatric history,     (-) TIA, CVA  GI/Hepatic/Renal/Endo    (+)  GERD,    (-) liver disease, no renal disease, diabetes, no thyroid disorder    Musculoskeletal     Abdominal    Substance History   (+) alcohol use (MJ),   (-) drug use     OB/GYN          Other        ROS/Med Hx Other: Eliquis, ranjit PM                Anesthesia Plan    ASA 4     general   (Preinduction arterial line placed  Risks and benefits of general anesthesia discussed with patient (including MI, CVA, death, recall, aspiration), questions answered, agreeable to proceed.  )  intravenous induction     Anesthetic plan, all risks, benefits, and alternatives have been provided, discussed and informed consent has been obtained with: patient.  Use of blood products discussed with patient  Consented to blood products.   Plan discussed with CRNA.

## 2021-02-09 NOTE — H&P
Cardiac Electrophysiology History and Physical          [facility]    History & Physical      PATIENT NAME  Nolvia Huff    :  1960 AGE: 61 y.o.    ADMISSION DATE: 2021     Subjective      CC:  Persistent Atrial Fibrillation    PROBLEM LIST:      1. Persistent Atrial fibrillation  1. CHADSVASC = 3  2. New onset, 2020  3. Echo 2020: EF 26 to 30%, left atrial cavity moderately dilated, small PFO with right to left shunting, borderline RV dilation, moderately reduced RV systolic function, moderate TR, RVSP 45 to 55 mmHg.  4. All antiarrhythmic drugs deemed contraindicated due to torsade w/ Tikosyn and structural heart disease with moderate PFO with right to left shunting on IRMA 2020.  5. Medtronic Micra VR Leadless PM implant 2020 with AVN ablation  6. Scheduled Watchman LAAC device implant 2021  2. Dyslipidemia  3. COPD  4. Former tobacco user  5. Anxiety disorder  6. GERD      HISTORY OF PRESENT ILLNESS:     Mrs Nolvia Huff is a 61-year-old white female who presents today for elective watchman left atrial appendage closure device placement.  Patient was recently seen in EP follow-up with reported frequent uncontrollable nosebleeds on anticoagulation therapy and reported missing frequent doses of her afternoon Eliquis dose due to inability to remember to take it.  Patient initially noted to be on chronic steroid therapy for her severe COPD placing her at high risk for bleeding as well.  Patient noted to be status post leadless permanent pacemaker implant with AV node ablation for her persistent atrial fibrillation refractory to medical management and deemed not an appropriate candidate for antiarrhythmic medications due to episode of torsades on Tikosyn therapy and her structural heart disease.  Upon evaluation patient reports doing well status post pacemaker implant with AV node ablation without recurrent symptomology.   Patient denies chest pain, palpitations, dizziness, presyncope, or syncope.  Patient admits compliance to Eliquis therapy without reported recurrent nosebleeds, side effects, or TIA/strokelike symptoms since being seen in follow-up in January.  Patient denies recent infections or signs of fever, chills, sweats, or cough.  Patient denies recent sick contacts.    PAST MEDICAL HISTORY  Past Medical History:   Diagnosis Date   • Acute otitis media     H/o   • Atrial fibrillation (CMS/HCC)    • COPD (chronic obstructive pulmonary disease) (CMS/HCC)     inhalers prn    • Dependence on nocturnal oxygen therapy     2-3 liters at night and daytime use during summer months    • GERD (gastroesophageal reflux disease)    • H/O chest x-ray 10/04/2011    Cardiac silhouute normal limits. The pulmonary vasculature appears normal, The diaphragms are flattened bilaterally which is sug of COPD. Lungs are grossly clear bilaterally with no prominent nodules or masses seen. No change when compared to previous exam from 11/10/09   • H/O chest x-ray 05/31/2011    Chronic change. No active disease   • H/O chest x-ray 11/10/2009    Moderate degenerative changes of thoracic spine. Cardiac silhoutte appears normal. There is slight hyperexpansion. There were a few scattered calcified granulomas. No acute appearing infiltrates noted. no real change compared to 10/20/08   • History of PFTs 07/06/2015    Severe OAD w/ dec in FEV from prior. no resp to BDRX. MVV reduced. FVC reduced; sugg restricitve pattern   • History of PFTs 10/21/2013    Mod severe OAD, FVC normal, no restriction, MVV reduced   • History of PFTs 06/19/2012    Severe OAD, FVC reduced, MVV dec   • Normal stillborn     Pt had stillborn infant   • Pacemaker        SURGICAL HISTORY   has a past surgical history that includes Hysterectomy; Tonsillectomy; Cardiac electrophysiology procedure (N/A, 9/30/2020); Breast cyst aspiration (Right); and Colonoscopy.     SOCIAL HISTORY  Social  History     Socioeconomic History   • Marital status:      Spouse name: Not on file   • Number of children: Not on file   • Years of education: Not on file   • Highest education level: Not on file   Tobacco Use   • Smoking status: Former Smoker     Packs/day: 2.00     Years: 30.00     Pack years: 60.00     Types: Cigarettes     Quit date:      Years since quittin.1   • Smokeless tobacco: Never Used   • Tobacco comment: fomer smoker 2 ppd x 30 years-Still smoke 1 cig, sporadically   Substance and Sexual Activity   • Alcohol use: Not Currently     Frequency: Monthly or less     Drinks per session: 1 or 2     Binge frequency: Never   • Drug use: Yes     Types: Marijuana     Comment: occasionally smoked marijuana   • Sexual activity: Defer   Social History Narrative    Caffeine: 1 cup coffee daily and some soda       FAMILY HISTORY  family history includes Asthma in her brother; Breast cancer in her sister; Cancer in her maternal grandfather and mother; Cerebral palsy in her sister; Emphysema in her mother; Heart disease in her maternal grandmother; Heart murmur in her brother and father; No Known Problems in her paternal grandfather, paternal grandmother, and son; Other in her son.     MEDICATIONS  Prior to Admission medications    Medication Sig Start Date End Date Taking? Authorizing Provider   albuterol sulfate  (90 Base) MCG/ACT inhaler Inhale 2 puffs Every 4 (Four) Hours As Needed for Wheezing. 10/20/20  Yes Maryellen Higuera APRN   Eliquis 5 MG tablet tablet Take 1 tablet by mouth 2 (Two) Times a Day.  Patient taking differently: Take 5 mg by mouth Every 12 (Twelve) Hours. Will hold 2 doses 21  Yes Darcy Fernández APRN   Fluticasone Furoate-Vilanterol (Breo Ellipta) 200-25 MCG/INH inhaler Inhale 1 puff Daily. 20  Yes Maryellen Higuera APRN   guaiFENesin (MUCINEX) 600 MG 12 hr tablet Take 1 tablet by mouth 2 (Two) Times a Day.  Patient taking differently: Take 600 mg by mouth  Daily As Needed. 3/9/20  Yes Maryellen Higuera APRN   ipratropium-albuterol (DUO-NEB) 0.5-2.5 mg/3 ml nebulizer Take 3 mL by nebulization 4 (Four) Times a Day As Needed for Wheezing. 10/20/20  Yes Maryellen Higuera APRN   O2 (OXYGEN) Inhale 2 L/min Every Night.   Yes Provider, MD Macho   tiotropium (Spiriva HandiHaler) 18 MCG per inhalation capsule Place 1 capsule into inhaler and inhale Daily. 7/7/20  Yes Maryellen Higuera APRN   aspirin 81 MG EC tablet Take 1 tablet by mouth Daily. 2/8/21   Maximus Solomon DO   montelukast (SINGULAIR) 10 MG tablet Take 1 tablet by mouth Every Night.  Patient taking differently: Take 10 mg by mouth As Needed. 6/8/20   Maryellen Higuera APRN   omeprazole (priLOSEC) 40 MG capsule Take 1 capsule by mouth Daily.  Patient taking differently: Take 40 mg by mouth As Needed. 3/5/18   Samreen Sandra APRN       ALLERGIES  Allergies   Allergen Reactions   • Penicillins Anaphylaxis   • Tikosyn [Dofetilide] Other (See Comments)     Torsades       REVIEW OF SYSTEMS  Constitutional: No fevers or chills, no recent weight gain or weight loss or fatigue  Eyes: No visual loss, blurred vision, double vision, yellow sclerae.  ENT: No headaches, hearing loss, vertigo, congestion or sore throat.   Cardiovascular: Per HPI  Respiratory: No cough or wheezing, no sputum production, no hematemesis   Gastrointestinal: No abdominal pain, no nausea, vomiting, constipation, diarrhea, melena.   Genitourinary: No dysuria, hematuria or increased frequency.  Musculoskeletal:  No gait disturbance, weakness or joint pain or stiffness  Integumentary: No rashes, urticaria, ulcers or sores.   Neurological: No headache, dizziness, syncope, paralysis, ataxia, no prior CVA/TIA  Psychiatric: No anxiety, or depression  Endocrine: No diaphoresis, cold or heat intolerance. No polyuria or polydipsia.   Hematologic/Lymphatic: No anemia, abnormal bruising or bleeding. No history of DVT/PE.      Objective      PHYSICAL  "EXAM    Vital Signs: /71   Pulse 83   Temp 97.3 °F (36.3 °C) (Temporal)   Ht 170.2 cm (67\")   Wt 67.9 kg (149 lb 11.1 oz)   LMP  (LMP Unknown)   SpO2 96%   BMI 23.45 kg/m²     General appearance: Awake, alert, cooperative  Head: Normocephalic, without obvious abnormality, atraumatic  Eyes: Conjunctivae/corneas clear, EOM's intact  Neck: no adenopathy, no carotid bruit, no JVD and thyroid: not enlarged  Lungs: clear to auscultation bilaterally and no rhonchi or crackles\", ' symmetric  Heart: regular rate and rhythm, S1, S2 normal, no murmur, click, rub or gallop  Abdomen: Soft, non-tender. Bowel sounds normal,  no organomegaly  Extremities: extremities normal, atraumatic, no cyanosis or edema  Skin: Skin color, turgor normal, no rashes or lesions  Neurologic: Grossly normal     Lab Results   Component Value Date    WBC 7.28 02/05/2021    HGB 15.4 02/05/2021    HCT 47.1 (H) 02/05/2021    MCV 89.4 02/05/2021     02/05/2021       BMP:  Results from last 7 days   Lab Units 02/05/21  0914   POTASSIUM mmol/L 4.5   CHLORIDE mmol/L 103   CO2 mmol/L 28.0   BUN mg/dL 9   CREATININE mg/dL 0.68   GLUCOSE mg/dL 116*   CALCIUM mg/dL 9.5       CURRENT MEDICATIONS:  )clindamycin, 900 mg, Intravenous, 60 Min Pre-Op  famotidine, 20 mg, Intravenous, Once  famotidine, 20 mg, Oral, Once  sodium chloride, 10 mL, Intravenous, Q12H  sodium chloride, 3 mL, Intravenous, Q12H      CONTINUOUS INFUSIONS:  lactated ringers, 9 mL/hr          TELEMETRY: Paced 70 bpm    Assessment & Plan     Mrs Nolvia Huff is a 61-year-old white female who presents today for elective watchman left atrial appendage closure device placement.  Patient was recently seen in EP follow-up with reported frequent uncontrollable nosebleeds on anticoagulation therapy and reported missing frequent doses of her afternoon Eliquis dose due to inability to remember to take it.  Patient initially noted to be on chronic steroid therapy for her severe COPD " placing her at high risk for bleeding as well.  Patient assessed to have an appropriate reason to pursue nonpharmacological alternative to long-term anticoagulation therapy.  Patient assessed to be a appropriate candidate for long-term anticoagulation but increased risk of bleeding on long-term anticoagulation.  A shared decision making encounter with a noninterventional physician occurred as documented in the chart.  All risk, benefits, and alternatives to the procedure were outlined reviewed with patient in EP follow-up and again here at bedside today.  Patient verbalizes complete understanding of the procedure and is willing to proceed as scheduled.  All preprocedure lab evaluation reviewed and acceptable.  Patient has a negative Covid screening documented with last 72 hours.      Electronically signed by HERNAN Case, 02/09/21, 8:53 AM EST.      This note was completed using a voice transcription system. Every effort was made to ensure accuracy. However, inadvertent computerized transcription errors may be present.

## 2021-02-09 NOTE — ANESTHESIA PROCEDURE NOTES
Airway  Urgency: elective    Date/Time: 2/9/2021 10:55 AM  Airway not difficult    General Information and Staff    Patient location during procedure: OR    Indications and Patient Condition  Indications for airway management: airway protection    Preoxygenated: yes  Mask difficulty assessment: 1 - vent by mask    Final Airway Details  Final airway type: endotracheal airway      Successful airway: ETT  Cuffed: yes   Successful intubation technique: direct laryngoscopy  Facilitating devices/methods: intubating stylet  Endotracheal tube insertion site: oral  Blade: Gilbert  Blade size: 2  ETT size (mm): 7.0  Cormack-Lehane Classification: grade I - full view of glottis  Placement verified by: chest auscultation and capnometry   Measured from: lips  ETT/EBT  to lips (cm): 21  Number of attempts at approach: 1  Assessment: lips, teeth, and gum same as pre-op and atraumatic intubation

## 2021-02-09 NOTE — OP NOTE
"      Cardiac Electrophysiology Procedure Note          [facility] at [facility]                LEFT ATRIAL APPENDAGE CLOSURE (LAAC)                                 Watchman Implant    PROCEDURES PERFORMED:    Left atrial appendage closure with 27 Watchman FLX device.  Patient was admitted to the hospital for this procedure.  This is an inpatient procedure.    PREPROCEDURAL DIAGNOSES:    1. Persistent Atrial fibrillation  2. [id number] score of 3 ( female sex, heart failure, peripheral arterial disease / aortic atheroma  )  3. HASBLED score of 2    REFERRING PHYSICIAN:    Dr. Denney ( PCP )    POST PROCEDURE DIANGOSES:  As above.    INDICATION FOR PROCEDURE:  Briefly, Nolvia Huff is a 61 y.o. year old female with a history of recurrent falls severe COPD chronic steroid use chronic significant severe bruising bleeds with falls multiple hematomas and contusions.  She has a elevated [id number] score and a has bled score of 2.  After lengthy conversation she wished to proceed with elective watchman implantation to obviate the need for long-term anticoagulation in this high-risk patient.    ANTICOAGULATION STRATEGY PRIOR TO AND POST PROCEDURE: Apixaban 5 mg twice daily.  The last dose of anticoagulant was confirmed to have been taken last evening.    PT/INR:  No results found for: LABPROT, INR  PTT:  No results found for: APTT    CBC: No results found for: WBC, RBC, HGB, HCT, MCV, RDW, PLT  BMP:   No results found for: NA, K, CL, CO2, BUN, CREATININE, LABCREA, EGFR, GLU, LABGLUC    Vital Signs: /60   Pulse 60   Temp 97 °F (36.1 °C) (Temporal)   Resp 16   Ht 170.2 cm (67\")   Wt 67.9 kg (149 lb 11.1 oz)   LMP  (LMP Unknown)   SpO2 96%   BMI 23.45 kg/m²      Admit Weight:  67.9 kg (149 lb 11.1 oz)  BMI: Body mass index is 23.45 kg/m².    General anesthesia was selected of his patient due to severe lung disease and extensive comorbidities as outlined above    PROCEDURE " NARRATIVE:    The patient was able to give written informed consent after revisiting the key portions of the risk versus benefit profile of the procedure.  This discussion was framed by our lengthy conversations  (please see our detailed notes).  Patient verbalized strong understanding of this discussion and a strong desire to proceed with the procedure.  Please note that this detailed informed consent process utilized mutual and shared decision making process between all parties involved, principally the physician and patient, but also potentially with input from the patient's selected family and friends.    Please also note that the patient was seen and examined prior to this procedure by a non implanting physician who agreed that this patient would benefit from left atrial appendage closure as an alternative to long-term anticoagulation.  Please see this physician separate attestation.    The patient was brought to the EP Lab in the post absorptive state.   A IRMA probe was placed by my cardiology colleague physician.  IRMA was used continuously throughout the procedure for monitoring of the pericardial space, trans-septal puncture and also measurement of left atrial appendage and ultimately delivery of the left atrial appendage occlusion device.  Please see the separate and detailed IRMA report.     The patient was then prepared and draped in a routing sterile fashion.  Seldinger access was obtained at the right common femoral vein with a single venipuncture utilizing ultrasound probe guided vascular access.  A J tip wire was advanced into the vascular space.  A 16 Malay long sheath was placed into the inferior vena cava.  The patient was anticoagulated with unfractionated heparin in bolus and then continuous infusion to maintain an ACT of between 200 and 300 seconds. An SL0 sheath and a BRK needle along with safe Sept septal were used to perform transseptal puncture with a combination of fluoroscopic and  echocardiographic guidance.    The Watchman delivery sheath was then placed in the left atrium under echocardiographic and fluoroscopic guidance safely.  Left atrial appendage was then cannulated with a 10 mm angled pigtail catheter.  This was again performed with a combination of echocardiographic and fluoroscopic guidance.  Contrast injection was also performed.  The double curve Watchman delivery sheath was then placed into optimal position within left atrial appendage.  The left atrial appendage diameter was viewed in multiple projections on echocardiography as well as fluoroscopy with contrast injection into the left atrial appendage.  Initially we placed a 31 mm device however the compression was too great in the device fixation system was under deployed.  We removed the 31 mm device in toto.  A 27 mm device was then selected and delivered with the supplied delivery tools to the left atrial appendage safely.      PASS criteria were then evaluated and confirmed.      Compression of the device varied 18 % and  20 %    The device was then safely released.    Catheters and sheaths were withdrawn from the left atrium and then the body.  Hemostasis at the site of venous access was achieved after withdrawing the sheath from the body with a figure-of-eight stitch and manual pressure.  Transesophageal echocardiogram demonstrated no pericardial effusion.    Protamine was given to reverse anticoagulation.    The patient was transferred to recovery in stable condition.    COMPLICATIONS: None    EBL: minimal    RADIATION EXPOSURE: 103 mGy over 9.3 minutes    LA PRESSURE: prior to placement of the left atrial appendage closure device.:  22 / 10 mmHg, with a mean of 12 mmHg    KEY PROCEDURAL FINDINGS:    · Successful implantation of 27 mm watchman FLX device.  All directly covered.  No peridevice leak.  And pleased with the result.    POST PROCEDURAL PLAN:    · Uninterrupted anticoagulation for not less than 45 days unless  specially instructed otherwise by myself or another member of our EP physician team.  Please note that the patient and the patient’s family have been extensively counseled about this critical requirement and have agreed to comply.   During his 45 day.  The patient will also be taking 81 mg of aspirin.  · After 45 days of full anticoagulation,  a transesophageal echo will be obtained to assess for endothelialization of the left atrial appendage occlusion device.  If this is documented then the patient will be transition to dual antiplatelet therapy.  This will be clopidogrel 75 mg daily and aspirin 81 mg daily. After course of 6 months from the date of implant of dual antiplatelet therapy patient will then be transition to aspirin monotherapy at a dose of 325 mg daily.  · Anticipate discharge tomorrow.  Please note that this patient was admitted specifically for this elective procedure.  This is an inpatient procedure.  · The patient will be seen at our office per protocol for this procedure.  A 45 day post implant IRMA will be scheduled.    Maximus Solomon DO, FACC, [facility]  Cardiac Electrophysiologist  [facility] / [facility]

## 2021-02-09 NOTE — ANESTHESIA POSTPROCEDURE EVALUATION
Patient: Nolvia Huff    Procedure Summary     Date: 02/09/21 Room / Location: [facility] C[facility]/EP LAB [facility] / [facility] INVASIVE LOCATION    Anesthesia Start: 1041 Anesthesia Stop: 1227    Procedure: Atrial Appendage Occlusion (Watchman), on Eliquis hold 2 doses (N/A ) Diagnosis:       Atrial fibrillation with rapid ventricular response (CMS/HCC)      (afib)    Provider: Maximus Solomon DO Provider: Garett Lassiter MD    Anesthesia Type: general ASA Status: 4          Anesthesia Type: general    Vitals  No vitals data found for the desired time range.          Post Anesthesia Care and Evaluation    Patient location during evaluation: PACU  Patient participation: complete - patient participated  Level of consciousness: awake and alert  Pain management: adequate  Airway patency: patent  Anesthetic complications: No anesthetic complications  PONV Status: none  Cardiovascular status: hemodynamically stable and acceptable  Respiratory status: nonlabored ventilation, acceptable and nasal cannula  Hydration status: acceptable

## 2021-02-10 ENCOUNTER — READMISSION MANAGEMENT (OUTPATIENT)
Dept: CALL CENTER | Facility: HOSPITAL | Age: 61
End: 2021-02-10

## 2021-02-10 VITALS
WEIGHT: 149.69 LBS | SYSTOLIC BLOOD PRESSURE: 97 MMHG | DIASTOLIC BLOOD PRESSURE: 59 MMHG | OXYGEN SATURATION: 98 % | HEART RATE: 60 BPM | TEMPERATURE: 98.4 F | RESPIRATION RATE: 18 BRPM | HEIGHT: 67 IN | BODY MASS INDEX: 23.49 KG/M2

## 2021-02-10 LAB
QT INTERVAL: 474 MS
QTC INTERVAL: 477 MS

## 2021-02-10 PROCEDURE — 93010 ELECTROCARDIOGRAM REPORT: CPT | Performed by: INTERNAL MEDICINE

## 2021-02-10 PROCEDURE — 99238 HOSP IP/OBS DSCHRG MGMT 30/<: CPT | Performed by: NURSE PRACTITIONER

## 2021-02-10 RX ADMIN — APIXABAN 5 MG: 5 TABLET, FILM COATED ORAL at 08:26

## 2021-02-10 RX ADMIN — SODIUM CHLORIDE, PRESERVATIVE FREE 10 ML: 5 INJECTION INTRAVENOUS at 08:26

## 2021-02-10 RX ADMIN — ASPIRIN 81 MG: 81 TABLET, COATED ORAL at 08:26

## 2021-02-10 NOTE — OUTREACH NOTE
Prep Survey      Responses   [facility] [place] facility patient discharged from?  [place]   Is LACE score < 7 ?  No   Emergency Room discharge w/ pulse ox?  No   Eligibility  [facility]   Date of Admission  02/09/21   Date of Discharge  02/10/21   Discharge Disposition  Home or Self Care   Discharge diagnosis  ATRIAL APPENDAGE EXCLUSION LEFT WITH TRANSESOPHAGEAL ECHOCARDIOGRAM   Does the patient have one of the following disease processes/diagnoses(primary or secondary)?  Other   Does the patient have Home health ordered?  No   Is there a DME ordered?  No   Prep survey completed?  Yes          Olimpia Kim RN

## 2021-02-10 NOTE — PLAN OF CARE
Goal Outcome Evaluation:  Plan of Care Reviewed With: patient  Progress: improving  Outcome Summary: Right groin site soft, no bleeding or hematoma noted, gauze and tegaderm in place. VSS on 2 L NC, which is pt's baseline. V-paced on the monitor. No complaints from pt at this time.

## 2021-02-10 NOTE — PROGRESS NOTES
Discharge Planning Assessment  [facility]     Patient Name: Nolvia Huff  MRN: [id number]  Today's Date: 2/10/2021    Admit Date: 2/9/2021    Discharge Needs Assessment    No documentation.       Discharge Plan     Row Name 02/10/21 1415       Plan    Plan  Home    Patient/Family in Agreement with Plan  yes    Plan Comments  Spoke with patient at bedside. She lives in [facility] with her ex-.  Independent, has oxygen with [employer] and nebulizers.  Cares for a 6 month old, 14 y.o. girl and 12 y.o. boy. She denied dc needs,  will transport home.    Final Discharge Disposition Code  01 - home or self-care        Continued Care and Services - Discharged on 2/10/2021 Admission date: 2/9/2021 - Discharge disposition: Home or Self Care   Coordination has not been started for this encounter.       Expected Discharge Date and Time     Expected Discharge Date Expected Discharge Time    Feb 10, 2021         Demographic Summary    No documentation.       Functional Status    No documentation.       Psychosocial    No documentation.       Abuse/Neglect    No documentation.       Legal    No documentation.       Substance Abuse    No documentation.       Patient Forms    No documentation.           Mckenzie Jacobs RN

## 2021-02-10 NOTE — PAYOR COMM NOTE
"Darcy Gregg RN  Utilization Review  P: [phone]  F: [phone]    Member ID:  [id number]  Notification of inpatient admission (scheduled IP procedure)    Nolvia De La Torre (61 y.o. Female)     Date of Birth Social Security Number Address Home Phone MRN    1960  [id number] [id number] [phone] [id number]    Oriental orthodox Marital Status          None        Admission Date Admission Type Admitting Provider Attending Provider Department, Room/Bed    2/9/21 Urgent Maximus Solomon DO Aasbo, Johan D, DO [facility] 4H, S475/1    Discharge Date Discharge Disposition Discharge Destination                       Attending Provider: Maximus Solomon DO    Allergies: Penicillins, Tikosyn [Dofetilide]    Isolation: None   Infection: None   Code Status: Prior    Ht: 170.2 cm (67\")   Wt: 67.9 kg (149 lb 11.1 oz)    Admission Cmt: None   Principal Problem: Atrial fibrillation with rapid ventricular response (CMS/HCC) [I48.91]                 Active Insurance as of 2/9/2021     Primary Coverage     Payor Plan Insurance Group Employer/Plan Group    [employer] [facility] [place]      Payor Plan Address Payor Plan Phone Number Payor Plan Fax Number Effective Dates    [address] [address]   1/1/2014 - None Entered    [place] [address]       Subscriber Name Subscriber Birth Date Member ID       NOLVIA DE LA TORRE 1960 [id number]                 Emergency Contacts      (Rel.) Home Phone Work Phone Mobile Phone    DariaJose D herrmann (Relative) [phone] -- [phone]    DariaAndrezMak -- -- [phone]    Cande De La Torre (Other) -- -- [phone]               History & Physical      CruzBraydon APRN at 02/09/21 0841     Attestation signed by Maximus Solomon DO at 02/09/21 7989    Maximus PALAFOX. DO Juanito, FACC, FHRS  Cardiac Electrophysiologist  [facility]                          Cardiac " Electrophysiology History and Physical          [facility]    History & Physical      PATIENT NAME  Nolvia Huff    :  1960 AGE: 61 y.o.    ADMISSION DATE: 2021     Subjective      CC:  Persistent Atrial Fibrillation    PROBLEM LIST:      1. Persistent Atrial fibrillation  1. CHADSVASC = 3  2. New onset, 2020  3. Echo 2020: EF 26 to 30%, left atrial cavity moderately dilated, small PFO with right to left shunting, borderline RV dilation, moderately reduced RV systolic function, moderate TR, RVSP 45 to 55 mmHg.  4. All antiarrhythmic drugs deemed contraindicated due to torsade w/ Tikosyn and structural heart disease with moderate PFO with right to left shunting on IRMA 2020.  5. Medtronic Micra VR Leadless PM implant 2020 with AVN ablation  6. Scheduled Watchman LAAC device implant 2021  2. Dyslipidemia  3. COPD  4. Former tobacco user  5. Anxiety disorder  6. GERD      HISTORY OF PRESENT ILLNESS:     Mrs Nolvia Huff is a 61-year-old white female who presents today for elective watchman left atrial appendage closure device placement.  Patient was recently seen in EP follow-up with reported frequent uncontrollable nosebleeds on anticoagulation therapy and reported missing frequent doses of her afternoon Eliquis dose due to inability to remember to take it.  Patient initially noted to be on chronic steroid therapy for her severe COPD placing her at high risk for bleeding as well.  Patient noted to be status post leadless permanent pacemaker implant with AV node ablation for her persistent atrial fibrillation refractory to medical management and deemed not an appropriate candidate for antiarrhythmic medications due to episode of torsades on Tikosyn therapy and her structural heart disease.  Upon evaluation patient reports doing well status post pacemaker implant with AV node ablation without recurrent symptomology.  Patient denies  chest pain, palpitations, dizziness, presyncope, or syncope.  Patient admits compliance to Eliquis therapy without reported recurrent nosebleeds, side effects, or TIA/strokelike symptoms since being seen in follow-up in January.  Patient denies recent infections or signs of fever, chills, sweats, or cough.  Patient denies recent sick contacts.    PAST MEDICAL HISTORY  Past Medical History:   Diagnosis Date   • Acute otitis media     H/o   • Atrial fibrillation (CMS/HCC)    • COPD (chronic obstructive pulmonary disease) (CMS/HCC)     inhalers prn    • Dependence on nocturnal oxygen therapy     2-3 liters at night and daytime use during summer months    • GERD (gastroesophageal reflux disease)    • H/O chest x-ray 10/04/2011    Cardiac silhouute normal limits. The pulmonary vasculature appears normal, The diaphragms are flattened bilaterally which is sug of COPD. Lungs are grossly clear bilaterally with no prominent nodules or masses seen. No change when compared to previous exam from 11/10/09   • H/O chest x-ray 05/31/2011    Chronic change. No active disease   • H/O chest x-ray 11/10/2009    Moderate degenerative changes of thoracic spine. Cardiac silhoutte appears normal. There is slight hyperexpansion. There were a few scattered calcified granulomas. No acute appearing infiltrates noted. no real change compared to 10/20/08   • History of PFTs 07/06/2015    Severe OAD w/ dec in FEV from prior. no resp to BDRX. MVV reduced. FVC reduced; sugg restricitve pattern   • History of PFTs 10/21/2013    Mod severe OAD, FVC normal, no restriction, MVV reduced   • History of PFTs 06/19/2012    Severe OAD, FVC reduced, MVV dec   • Normal stillborn     Pt had stillborn infant   • Pacemaker        SURGICAL HISTORY   has a past surgical history that includes Hysterectomy; Tonsillectomy; Cardiac electrophysiology procedure (N/A, 9/30/2020); Breast cyst aspiration (Right); and Colonoscopy.     SOCIAL HISTORY  Social History      Socioeconomic History   • Marital status:      Spouse name: Not on file   • Number of children: Not on file   • Years of education: Not on file   • Highest education level: Not on file   Tobacco Use   • Smoking status: Former Smoker     Packs/day: 2.00     Years: 30.00     Pack years: 60.00     Types: Cigarettes     Quit date:      Years since quittin.1   • Smokeless tobacco: Never Used   • Tobacco comment: fomer smoker 2 ppd x 30 years-Still smoke 1 cig, sporadically   Substance and Sexual Activity   • Alcohol use: Not Currently     Frequency: Monthly or less     Drinks per session: 1 or 2     Binge frequency: Never   • Drug use: Yes     Types: Marijuana     Comment: occasionally smoked marijuana   • Sexual activity: Defer   Social History Narrative    Caffeine: 1 cup coffee daily and some soda       FAMILY HISTORY  family history includes Asthma in her brother; Breast cancer in her sister; Cancer in her maternal grandfather and mother; Cerebral palsy in her sister; Emphysema in her mother; Heart disease in her maternal grandmother; Heart murmur in her brother and father; No Known Problems in her paternal grandfather, paternal grandmother, and son; Other in her son.     MEDICATIONS  Prior to Admission medications    Medication Sig Start Date End Date Taking? Authorizing Provider   albuterol sulfate  (90 Base) MCG/ACT inhaler Inhale 2 puffs Every 4 (Four) Hours As Needed for Wheezing. 10/20/20  Yes Maryellen Higuera APRN   Eliquis 5 MG tablet tablet Take 1 tablet by mouth 2 (Two) Times a Day.  Patient taking differently: Take 5 mg by mouth Every 12 (Twelve) Hours. Will hold 2 doses 21  Yes Darcy Fernández APRN   Fluticasone Furoate-Vilanterol (Breo Ellipta) 200-25 MCG/INH inhaler Inhale 1 puff Daily. 20  Yes Maryellen Higuera APRN   guaiFENesin (MUCINEX) 600 MG 12 hr tablet Take 1 tablet by mouth 2 (Two) Times a Day.  Patient taking differently: Take 600 mg by mouth Daily As  Needed. 3/9/20  Yes Maryellen Higuera APRN   ipratropium-albuterol (DUO-NEB) 0.5-2.5 mg/3 ml nebulizer Take 3 mL by nebulization 4 (Four) Times a Day As Needed for Wheezing. 10/20/20  Yes Maryellen Higuera APRN   O2 (OXYGEN) Inhale 2 L/min Every Night.   Yes Provider, MD Macho   tiotropium (Spiriva HandiHaler) 18 MCG per inhalation capsule Place 1 capsule into inhaler and inhale Daily. 7/7/20  Yes Maryellen Higuera APRN   aspirin 81 MG EC tablet Take 1 tablet by mouth Daily. 2/8/21   Maximus Solomon DO   montelukast (SINGULAIR) 10 MG tablet Take 1 tablet by mouth Every Night.  Patient taking differently: Take 10 mg by mouth As Needed. 6/8/20   Maryellen Higuera APRN   omeprazole (priLOSEC) 40 MG capsule Take 1 capsule by mouth Daily.  Patient taking differently: Take 40 mg by mouth As Needed. 3/5/18   Samreen Sandra APRN       ALLERGIES  Allergies   Allergen Reactions   • Penicillins Anaphylaxis   • Tikosyn [Dofetilide] Other (See Comments)     Torsades       REVIEW OF SYSTEMS  Constitutional: No fevers or chills, no recent weight gain or weight loss or fatigue  Eyes: No visual loss, blurred vision, double vision, yellow sclerae.  ENT: No headaches, hearing loss, vertigo, congestion or sore throat.   Cardiovascular: Per HPI  Respiratory: No cough or wheezing, no sputum production, no hematemesis   Gastrointestinal: No abdominal pain, no nausea, vomiting, constipation, diarrhea, melena.   Genitourinary: No dysuria, hematuria or increased frequency.  Musculoskeletal:  No gait disturbance, weakness or joint pain or stiffness  Integumentary: No rashes, urticaria, ulcers or sores.   Neurological: No headache, dizziness, syncope, paralysis, ataxia, no prior CVA/TIA  Psychiatric: No anxiety, or depression  Endocrine: No diaphoresis, cold or heat intolerance. No polyuria or polydipsia.   Hematologic/Lymphatic: No anemia, abnormal bruising or bleeding. No history of DVT/PE.      Objective      PHYSICAL  "EXAM    Vital Signs: /71   Pulse 83   Temp 97.3 °F (36.3 °C) (Temporal)   Ht 170.2 cm (67\")   Wt 67.9 kg (149 lb 11.1 oz)   LMP  (LMP Unknown)   SpO2 96%   BMI 23.45 kg/m²     General appearance: Awake, alert, cooperative  Head: Normocephalic, without obvious abnormality, atraumatic  Eyes: Conjunctivae/corneas clear, EOM's intact  Neck: no adenopathy, no carotid bruit, no JVD and thyroid: not enlarged  Lungs: clear to auscultation bilaterally and no rhonchi or crackles\", ' symmetric  Heart: regular rate and rhythm, S1, S2 normal, no murmur, click, rub or gallop  Abdomen: Soft, non-tender. Bowel sounds normal,  no organomegaly  Extremities: extremities normal, atraumatic, no cyanosis or edema  Skin: Skin color, turgor normal, no rashes or lesions  Neurologic: Grossly normal     Lab Results   Component Value Date    WBC 7.28 02/05/2021    HGB 15.4 02/05/2021    HCT 47.1 (H) 02/05/2021    MCV 89.4 02/05/2021     02/05/2021       BMP:  Results from last 7 days   Lab Units 02/05/21  0914   POTASSIUM mmol/L 4.5   CHLORIDE mmol/L 103   CO2 mmol/L 28.0   BUN mg/dL 9   CREATININE mg/dL 0.68   GLUCOSE mg/dL 116*   CALCIUM mg/dL 9.5       CURRENT MEDICATIONS:  )clindamycin, 900 mg, Intravenous, 60 Min Pre-Op  famotidine, 20 mg, Intravenous, Once  famotidine, 20 mg, Oral, Once  sodium chloride, 10 mL, Intravenous, Q12H  sodium chloride, 3 mL, Intravenous, Q12H      CONTINUOUS INFUSIONS:  lactated ringers, 9 mL/hr          TELEMETRY: Paced 70 bpm    Assessment & Plan     Mrs Nolvai Huff is a 61-year-old white female who presents today for elective watchman left atrial appendage closure device placement.  Patient was recently seen in EP follow-up with reported frequent uncontrollable nosebleeds on anticoagulation therapy and reported missing frequent doses of her afternoon Eliquis dose due to inability to remember to take it.  Patient initially noted to be on chronic steroid therapy for her severe COPD " placing her at high risk for bleeding as well.  Patient assessed to have an appropriate reason to pursue nonpharmacological alternative to long-term anticoagulation therapy.  Patient assessed to be a appropriate candidate for long-term anticoagulation but increased risk of bleeding on long-term anticoagulation.  A shared decision making encounter with a noninterventional physician occurred as documented in the chart.  All risk, benefits, and alternatives to the procedure were outlined reviewed with patient in EP follow-up and again here at bedside today.  Patient verbalizes complete understanding of the procedure and is willing to proceed as scheduled.  All preprocedure lab evaluation reviewed and acceptable.  Patient has a negative Covid screening documented with last 72 hours.      Electronically signed by HERNAN Case, 02/09/21, 8:53 AM EST.      This note was completed using a voice transcription system. Every effort was made to ensure accuracy. However, inadvertent computerized transcription errors may be present.          Electronically signed by Maximus Solomon DO at 02/09/21 8037          Operative/Procedure Notes (all)      Maximus Solomon DO at 02/09/21 1039  Version 1 of 1             Cardiac Electrophysiology Procedure Note          [facility]                LEFT ATRIAL APPENDAGE CLOSURE (LAAC)                                 Watchman Implant    PROCEDURES PERFORMED:    Left atrial appendage closure with 27 Watchman FLX device.  Patient was admitted to the hospital for this procedure.  This is an inpatient procedure.    PREPROCEDURAL DIAGNOSES:    1. Persistent Atrial fibrillation  2. [id number] score of 3 ( female sex, heart failure, peripheral arterial disease / aortic atheroma  )  3. HASBLED score of 2    REFERRING PHYSICIAN:    Dr. Denney ( PCP )    POST PROCEDURE DIANGOSES:  As above.    INDICATION FOR PROCEDURE:  Briefly, Nolvia Huff is a 61  "y.o. year old female with a history of recurrent falls severe COPD chronic steroid use chronic significant severe bruising bleeds with falls multiple hematomas and contusions.  She has a elevated [id number] score and a has bled score of 2.  After lengthy conversation she wished to proceed with elective watchman implantation to obviate the need for long-term anticoagulation in this high-risk patient.    ANTICOAGULATION STRATEGY PRIOR TO AND POST PROCEDURE: Apixaban 5 mg twice daily.  The last dose of anticoagulant was confirmed to have been taken last evening.    PT/INR:  No results found for: LABPROT, INR  PTT:  No results found for: APTT    CBC: No results found for: WBC, RBC, HGB, HCT, MCV, RDW, PLT  BMP:   No results found for: NA, K, CL, CO2, BUN, CREATININE, LABCREA, EGFR, GLU, LABGLUC    Vital Signs: /60   Pulse 60   Temp 97 °F (36.1 °C) (Temporal)   Resp 16   Ht 170.2 cm (67\")   Wt 67.9 kg (149 lb 11.1 oz)   LMP  (LMP Unknown)   SpO2 96%   BMI 23.45 kg/m²      Admit Weight:  67.9 kg (149 lb 11.1 oz)  BMI: Body mass index is 23.45 kg/m².    General anesthesia was selected of his patient due to severe lung disease and extensive comorbidities as outlined above    PROCEDURE NARRATIVE:    The patient was able to give written informed consent after revisiting the key portions of the risk versus benefit profile of the procedure.  This discussion was framed by our lengthy conversations  (please see our detailed notes).  Patient verbalized strong understanding of this discussion and a strong desire to proceed with the procedure.  Please note that this detailed informed consent process utilized mutual and shared decision making process between all parties involved, principally the physician and patient, but also potentially with input from the patient's selected family and friends.    Please also note that the patient was seen and examined prior to this procedure by a non implanting physician who agreed " that this patient would benefit from left atrial appendage closure as an alternative to long-term anticoagulation.  Please see this physician separate attestation.    The patient was brought to the EP Lab in the post absorptive state.   A IRMA probe was placed by my cardiology colleague physician.  IRMA was used continuously throughout the procedure for monitoring of the pericardial space, trans-septal puncture and also measurement of left atrial appendage and ultimately delivery of the left atrial appendage occlusion device.  Please see the separate and detailed IRMA report.     The patient was then prepared and draped in a routing sterile fashion.  Seldinger access was obtained at the right common femoral vein with a single venipuncture utilizing ultrasound probe guided vascular access.  A J tip wire was advanced into the vascular space.  A 16 Frisian long sheath was placed into the inferior vena cava.  The patient was anticoagulated with unfractionated heparin in bolus and then continuous infusion to maintain an ACT of between 200 and 300 seconds. An SL0 sheath and a BRK needle along with safe Sept septal were used to perform transseptal puncture with a combination of fluoroscopic and echocardiographic guidance.    The Watchman delivery sheath was then placed in the left atrium under echocardiographic and fluoroscopic guidance safely.  Left atrial appendage was then cannulated with a 10 mm angled pigtail catheter.  This was again performed with a combination of echocardiographic and fluoroscopic guidance.  Contrast injection was also performed.  The double curve Watchman delivery sheath was then placed into optimal position within left atrial appendage.  The left atrial appendage diameter was viewed in multiple projections on echocardiography as well as fluoroscopy with contrast injection into the left atrial appendage.  Initially we placed a 31 mm device however the compression was too great in the device fixation  system was under deployed.  We removed the 31 mm device in toto.  A 27 mm device was then selected and delivered with the supplied delivery tools to the left atrial appendage safely.      PASS criteria were then evaluated and confirmed.      Compression of the device varied 18 % and  20 %    The device was then safely released.    Catheters and sheaths were withdrawn from the left atrium and then the body.  Hemostasis at the site of venous access was achieved after withdrawing the sheath from the body with a figure-of-eight stitch and manual pressure.  Transesophageal echocardiogram demonstrated no pericardial effusion.    Protamine was given to reverse anticoagulation.    The patient was transferred to recovery in stable condition.    COMPLICATIONS: None    EBL: minimal    RADIATION EXPOSURE: 103 mGy over 9.3 minutes    LA PRESSURE: prior to placement of the left atrial appendage closure device.:  22 / 10 mmHg, with a mean of 12 mmHg    KEY PROCEDURAL FINDINGS:    · Successful implantation of 27 mm watchman FLX device.  All directly covered.  No peridevice leak.  And pleased with the result.    POST PROCEDURAL PLAN:    · Uninterrupted anticoagulation for not less than 45 days unless specially instructed otherwise by myself or another member of our EP physician team.  Please note that the patient and the patient’s family have been extensively counseled about this critical requirement and have agreed to comply.   During his 45 day.  The patient will also be taking 81 mg of aspirin.  · After 45 days of full anticoagulation,  a transesophageal echo will be obtained to assess for endothelialization of the left atrial appendage occlusion device.  If this is documented then the patient will be transition to dual antiplatelet therapy.  This will be clopidogrel 75 mg daily and aspirin 81 mg daily. After course of 6 months from the date of implant of dual antiplatelet therapy patient will then be transition to aspirin  monotherapy at a dose of 325 mg daily.  · Anticipate discharge tomorrow.  Please note that this patient was admitted specifically for this elective procedure.  This is an inpatient procedure.  · The patient will be seen at our office per protocol for this procedure.  A 45 day post implant IRMA will be scheduled.    Maximus Solomon DO, [facility], [facility]  Cardiac Electrophysiologist  [facility] / [facility]        Electronically signed by Maximus Solomon DO at 02/09/21 8565

## 2021-02-10 NOTE — PROGRESS NOTES
"      Cardiac Electrophysiology Inpatient Follow Up Note         [facility]    Progress Note      CC:  Persistent Atrial Fibrillation     PROBLEM LIST:       1. Persistent Atrial fibrillation  1. CHADSVASC = 3 ( female sex, heart failure, peripheral arterial disease / aortic atheroma  )  2. New onset, 9/21/2020  3. Echo 9/21/2020: EF 26 to 30%, left atrial cavity moderately dilated, small PFO with right to left shunting, borderline RV dilation, moderately reduced RV systolic function, moderate TR, RVSP 45 to 55 mmHg.  4. All antiarrhythmic drugs deemed contraindicated due to torsade w/ Tikosyn and structural heart disease with moderate PFO with right to left shunting on IRMA September 2020.  5. Medtronic Micra VR Leadless PM implant 9/30/2020 with AVN ablation  6. Scheduled Watchman LAAC device implant 2/9/2021  2. Dyslipidemia  3. COPD  4. Former tobacco user  5. Anxiety disorder  6. GERD      Subjective     Mrs Nolvia Huff is a 61-year-old white female seen in EP follow-up today status post watchman left atrial appendage closure device placement yesterday.  Upon evaluation patient is resting comfortably in bed without complaints.  Patient noted with Vascade closure device placement with a mild amount of postoperative bleeding and has been on bedrest overnight.  Patient's right groin is assessed without hematoma, ecchymosis, or drainage noted.  Patient was personally ambulated by myself in the hallway for greater than 200 feet with reassessment of groin without bleeding or hematoma.  Patient has been voiding without difficulty.  Patient states he is ready for discharge home today if possible.    REVIEW OF SYSTEMS  ROS no change from admission H&P except for: above    Objective   VITAL SIGNS  /59 (BP Location: Right arm, Patient Position: Lying)   Pulse 63   Temp 98.3 °F (36.8 °C) (Oral)   Resp 18   Ht 170.2 cm (67\")   Wt 67.9 kg (149 lb 11.1 oz)   LMP  (LMP " Unknown)   SpO2 98%   BMI 23.45 kg/m²     Pulse Ox: SpO2  Av.7 %  Min: 93 %  Max: 99 %  Supplemental O2:       Admit Weight  Weight: 67.9 kg (149 lb 11.1 oz)  Last 3 Weights    Body mass index is 23.45 kg/m².    INTAKE/OUTPUT  I/O last 3 completed shifts:  In: 740 [P.O.:240; I.V.:500]  Out: 350 [Urine:350]    Intake/Output Summary (Last 24 hours) at 2/10/2021 1022  Last data filed at 2/10/2021 0930  Gross per 24 hour   Intake 740 ml   Output 850 ml   Net -110 ml       PHYSICAL EXAM  General appearance: awake, alert, oriented, moves all extremities  Lungs: no rhonchi, no wheezes, no rales  Heart: S1-S2, RRR  Abdomen: positive bowel sounds, no bruits, no masses  Extremities: warm and dry, no cyanosis, no clubbing    LABS  Results from last 7 days   Lab Units 21  0914   WBC 10*3/mm3 7.28   HEMOGLOBIN g/dL 15.4   HEMATOCRIT % 47.1*   MCV fL 89.4   PLATELETS 10*3/mm3 287         Results from last 7 days   Lab Units 21  0914   POTASSIUM mmol/L 4.5   CHLORIDE mmol/L 103   CO2 mmol/L 28.0   BUN mg/dL 9   CREATININE mg/dL 0.68   GLUCOSE mg/dL 116*   CALCIUM mg/dL 9.5       CURRENT MEDICATIONS  apixaban, 5 mg, Oral, Q12H  aspirin, 81 mg, Oral, Daily  famotidine, 20 mg, Oral, Once  sodium chloride, 10 mL, Intravenous, Q12H        TELEMETRY: Paced 60 bpm    Assessment & Plan      Mrs Nolvia Huff is a 61-year-old white female seen in EP follow-up today status post watchman left atrial appendage closure device placement yesterday.  Patient is stable and ready for discharge home today with follow-up in 45 days for IRMA and in 3 months with Dr. Solomon in clinic.  Patient is instructed on all activity restrictions and wound care instructions.  Patient's medications are reviewed and outlined on discharge paperwork.  Patient verbalizes complete understanding of the above instruction education and is ready for discharge home today.      Electronically signed by HERNAN Case, 02/10/21, 10:27 AM EST.

## 2021-02-11 ENCOUNTER — TRANSITIONAL CARE MANAGEMENT TELEPHONE ENCOUNTER (OUTPATIENT)
Dept: CALL CENTER | Facility: HOSPITAL | Age: 61
End: 2021-02-11

## 2021-02-11 NOTE — OUTREACH NOTE
Call Center TCM Note      Responses   [facility] patient discharged from?  [place]   Does the patient have one of the following disease processes/diagnoses(primary or secondary)?  Other   TCM attempt successful?  Yes   Call start time  0936   Call end time  0937   Discharge diagnosis  ATRIAL APPENDAGE EXCLUSION LEFT WITH TRANSESOPHAGEAL ECHOCARDIOGRAM   Meds reviewed with patient/caregiver?  N/A   Does the patient have all medications ordered at discharge?  N/A   Is the patient taking all medications as directed (includes completed medication regime)?  N/A   Does the patient have a primary care provider?   Yes   Does the patient have an appointment with their PCP within 7 days of discharge?  Yes   Comments regarding PCP  f/u with Dr. Denney on 2/17/21   Has the patient kept scheduled appointments due by today?  N/A   Psychosocial issues?  No   Did the patient receive a copy of their discharge instructions?  Yes   Nursing interventions  Reviewed instructions with patient   What is the patient's perception of their health status since discharge?  Improving   Is the patient/caregiver able to teach back the hierarchy of who to call/visit for symptoms/problems? PCP, Specialist, Home health nurse, Urgent Care, ED, 911  Yes   TCM call completed?  Yes   Wrap up additional comments  States she is doing well, no questions or concerns at this time, following her discharge instructions closely.          Cristiana Roman RN    2/11/2021, 09:37 EST

## 2021-02-17 ENCOUNTER — READMISSION MANAGEMENT (OUTPATIENT)
Dept: CALL CENTER | Facility: HOSPITAL | Age: 61
End: 2021-02-17

## 2021-02-17 NOTE — OUTREACH NOTE
Medical Week 2 Survey      Responses   [facility] patient discharged from?  [place]   Does the patient have one of the following disease processes/diagnoses(primary or secondary)?  Other   Week 2 attempt successful?  Yes   Call start time  1412   Discharge diagnosis  ATRIAL APPENDAGE EXCLUSION LEFT WITH TRANSESOPHAGEAL ECHOCARDIOGRAM   Call end time  1416   Meds reviewed with patient/caregiver?  Yes   Is the patient having any side effects they believe may be caused by any medication additions or changes?  No   Does the patient have all medications ordered at discharge?  Yes   Is the patient taking all medications as directed (includes completed medication regime)?  Yes   Does the patient have a primary care provider?   Yes   Does the patient have an appointment with their PCP within 7 days of discharge?  Yes   Comments regarding PCP  f/u with Dr. Denney on 2/17/21   Has the patient kept scheduled appointments due by today?  N/A   Psychosocial issues?  No   Did the patient receive a copy of their discharge instructions?  Yes   Nursing interventions  Reviewed instructions with patient   What is the patient's perception of their health status since discharge?  Improving   Is the patient/caregiver able to teach back signs and symptoms related to disease process for when to call PCP?  Yes   Is the patient/caregiver able to teach back signs and symptoms related to disease process for when to call 911?  Yes   Is the patient/caregiver able to teach back the hierarchy of who to call/visit for symptoms/problems? PCP, Specialist, Home health nurse, Urgent Care, ED, 911  Yes   If the patient is a current smoker, are they able to teach back resources for cessation?  Not a smoker   Week 2 Call Completed?  Yes   Wrap up additional comments  States she is doing well, no questions or concerns at this time, following her discharge instructions closely.          Bia Meier, RN

## 2021-02-23 ENCOUNTER — READMISSION MANAGEMENT (OUTPATIENT)
Dept: CALL CENTER | Facility: HOSPITAL | Age: 61
End: 2021-02-23

## 2021-02-23 NOTE — OUTREACH NOTE
Medical Week 3 Survey      Responses   [facility] patient discharged from?  [place]   Does the patient have one of the following disease processes/diagnoses(primary or secondary)?  Other   Week 3 attempt successful?  Yes   Call start time  1441   Call end time  1442   Discharge diagnosis  ATRIAL APPENDAGE EXCLUSION LEFT WITH TRANSESOPHAGEAL ECHOCARDIOGRAM   Meds reviewed with patient/caregiver?  Yes   Is the patient having any side effects they believe may be caused by any medication additions or changes?  No   Does the patient have all medications ordered at discharge?  Yes   Is the patient taking all medications as directed (includes completed medication regime)?  Yes   Does the patient have a primary care provider?   Yes   Does the patient have an appointment with their PCP within 7 days of discharge?  Yes   Has the patient kept scheduled appointments due by today?  Yes   Has home health visited the patient within 72 hours of discharge?  N/A   Psychosocial issues?  No   Did the patient receive a copy of their discharge instructions?  Yes   Nursing interventions  Reviewed instructions with patient   What is the patient's perception of their health status since discharge?  Improving   Is the patient/caregiver able to teach back signs and symptoms related to disease process for when to call PCP?  Yes   Is the patient/caregiver able to teach back signs and symptoms related to disease process for when to call 911?  Yes   Is the patient/caregiver able to teach back the hierarchy of who to call/visit for symptoms/problems? PCP, Specialist, Home health nurse, Urgent Care, ED, 911  Yes   Week 3 Call Completed?  Yes          Aditya Tate RN

## 2021-03-02 ENCOUNTER — READMISSION MANAGEMENT (OUTPATIENT)
Dept: CALL CENTER | Facility: HOSPITAL | Age: 61
End: 2021-03-02

## 2021-03-02 NOTE — OUTREACH NOTE
Medical Week 4 Survey      Responses   [facility] patient discharged from?  [place]   Does the patient have one of the following disease processes/diagnoses(primary or secondary)?  Other   Week 4 attempt successful?  Yes   Call start time  1615   Call end time  1617   Discharge diagnosis  ATRIAL APPENDAGE EXCLUSION LEFT WITH TRANSESOPHAGEAL ECHOCARDIOGRAM   Meds reviewed with patient/caregiver?  Yes   Is the patient having any side effects they believe may be caused by any medication additions or changes?  No   Is the patient taking all medications as directed (includes completed medication regime)?  Yes   Has the patient kept scheduled appointments due by today?  Yes   Is the patient still receiving Home Health Services?  N/A   What is the patient's perception of their health status since discharge?  Improving   Is the patient/caregiver able to teach back signs and symptoms related to disease process for when to call PCP?  Yes   Is the patient/caregiver able to teach back signs and symptoms related to disease process for when to call 911?  Yes   Is the patient/caregiver able to teach back the hierarchy of who to call/visit for symptoms/problems? PCP, Specialist, Home health nurse, Urgent Care, ED, 911  Yes   If the patient is a current smoker, are they able to teach back resources for cessation?  Not a smoker   Week 4 Call Completed?  Yes   Would the patient like one additional call?  No   Graduated  Yes   Is the patient interested in additional calls from an ambulatory ?  NOTE:  applies to high risk patients requiring additional follow-up.  No   Did the patient feel the follow up calls were helpful during their recovery period?  Yes   Was the number of calls appropriate?  Yes   Wrap up additional comments  States she is doing well, no questions or concerns at this time, following her discharge instructions closely.          Bia Meier RN

## 2021-03-08 ENCOUNTER — OFFICE VISIT (OUTPATIENT)
Dept: CARDIOLOGY | Facility: HOSPITAL | Age: 61
End: 2021-03-08

## 2021-03-08 VITALS
HEIGHT: 67 IN | DIASTOLIC BLOOD PRESSURE: 68 MMHG | OXYGEN SATURATION: 97 % | RESPIRATION RATE: 18 BRPM | SYSTOLIC BLOOD PRESSURE: 121 MMHG | WEIGHT: 150.5 LBS | HEART RATE: 82 BPM | BODY MASS INDEX: 23.62 KG/M2 | TEMPERATURE: 97.5 F

## 2021-03-08 DIAGNOSIS — I48.19 PERSISTENT ATRIAL FIBRILLATION (HCC): ICD-10-CM

## 2021-03-08 DIAGNOSIS — I50.22 CHRONIC SYSTOLIC HEART FAILURE (HCC): Primary | ICD-10-CM

## 2021-03-08 DIAGNOSIS — I44.2 COMPLETE HEART BLOCK (HCC): ICD-10-CM

## 2021-03-08 DIAGNOSIS — J43.2 CENTRILOBULAR EMPHYSEMA (HCC): ICD-10-CM

## 2021-03-08 PROCEDURE — 99214 OFFICE O/P EST MOD 30 MIN: CPT | Performed by: NURSE PRACTITIONER

## 2021-03-08 NOTE — PROGRESS NOTES
"Chief Complaint  Congestive Heart Failure and Follow-up    Subjective    History of Present Illness {CC  Problem List  Visit  Diagnosis   Encounters  Notes  Medications  Labs  Result Review Imaging  Media :23}       History of Present Illness   61 year old female presents to the office today for ongoing evaluation of her chronic systolic heart failure and atrial fib. Patient recently had watchman placed due to excessive nosebleeds. She reports one note of epistaxis over the past months. She notes that she is feeling great. She is quite busy taking care of her grandchildren. She denies chest pain, dyspnea, palpitations, presyncope, syncope, orthopnea, pnd, pedal edema.  She is currently anticoagulated with Eliquis and denies any signs and symptoms of bleeding.  She is scheduled for IRMA (post watchman) at the end of this month.  Objective     Vital Signs:   Vitals:    03/08/21 0915   BP: 121/68   BP Location: Left arm   Patient Position: Sitting   Cuff Size: Adult   Pulse: 82   Resp: 18   Temp: 97.5 °F (36.4 °C)   TempSrc: Temporal   SpO2: 97%   Weight: 68.3 kg (150 lb 8 oz)   Height: 170.2 cm (67\")     Body mass index is 23.57 kg/m².  Physical Exam  Vitals and nursing note reviewed.   Constitutional:       Appearance: Normal appearance.   HENT:      Head: Normocephalic.   Eyes:      Pupils: Pupils are equal, round, and reactive to light.   Cardiovascular:      Rate and Rhythm: Normal rate and regular rhythm.      Pulses: Normal pulses.      Heart sounds: Normal heart sounds. No murmur.   Pulmonary:      Effort: Pulmonary effort is normal.      Breath sounds: Normal breath sounds.   Abdominal:      General: Bowel sounds are normal.      Palpations: Abdomen is soft.   Musculoskeletal:         General: Normal range of motion.      Cervical back: Normal range of motion.      Right lower leg: No edema.      Left lower leg: No edema.   Skin:     General: Skin is warm and dry.      Capillary Refill: Capillary " refill takes less than 2 seconds.   Neurological:      Mental Status: She is alert and oriented to person, place, and time.   Psychiatric:         Mood and Affect: Mood normal.         Thought Content: Thought content normal.              Result Review  Data Reviewed:{ Labs  Result Review  Imaging  Med Tab  Media :23}   CBC (No diff) (02/05/2021 09:14)  Basic metabolic panel (02/05/2021 09:14)  EP/CRM Study (02/09/2021 12:00)  Intra-Operative Transesophageal Echocardiogram (02/09/2021 16:14)               Assessment and Plan {CC Problem List  Visit Diagnosis  ROS  Review (Popup)  Health Maintenance  Quality  BestPractice  Medications  SmartSets  SnapShot Encounters  Media :23}   1. Chronic systolic heart failure (CMS/HCC)  Euvolemic  Per most recent IRMA, EF has mostly normalized  No ACE ARB or Arni secondary to hypotension  2. Persistent atrial fibrillation (CMS/HCC)  Status post AV node ablation  Status post watchman implant  Patient is currently anticoagulated with Eliquis and denies any signs and symptoms of bleeding  CHADS-VASc Risk Assessment            2 Total Score    1 CHF    1 Sex: Female        Criteria that do not apply:    Hypertension    Age >/= 75    DM    PRIOR STROKE/TIA/THROMBO    Vascular Disease    Age 65-74          3. Complete heart block (CMS/HCC)  Status post Medtronic Micra VR leadless pacemaker implant 9/30/2020  4. Centrilobular emphysema (CMS/HCC)  Stable, without exacerbation        Follow Up {Instructions Charge Capture  Follow-up Communications :23}   Return in about 4 months (around 7/6/2021) for Office visit, HF.    Patient was given instructions and counseling regarding her condition or for health maintenance advice. Please see specific information pulled into the AVS if appropriate.  Patient was instructed to call the [facility] with any questions, concerns, or worsening symptoms.    *Please note that portions of this note were completed with a voice  recognition program. Efforts were made to edit the dictations, but occasionally words are mistranscribed.

## 2021-03-28 ENCOUNTER — APPOINTMENT (OUTPATIENT)
Dept: PREADMISSION TESTING | Facility: HOSPITAL | Age: 61
End: 2021-03-28

## 2021-03-28 LAB — SARS-COV-2 RNA NOSE QL NAA+PROBE: NOT DETECTED

## 2021-03-28 PROCEDURE — C9803 HOPD COVID-19 SPEC COLLECT: HCPCS

## 2021-03-28 PROCEDURE — U0004 COV-19 TEST NON-CDC HGH THRU: HCPCS

## 2021-03-30 ENCOUNTER — HOSPITAL ENCOUNTER (OUTPATIENT)
Dept: CARDIOLOGY | Facility: HOSPITAL | Age: 61
Discharge: HOME | End: 2021-03-30
Admitting: NURSE PRACTITIONER

## 2021-03-30 VITALS
SYSTOLIC BLOOD PRESSURE: 102 MMHG | DIASTOLIC BLOOD PRESSURE: 63 MMHG | RESPIRATION RATE: 18 BRPM | OXYGEN SATURATION: 91 % | HEART RATE: 60 BPM

## 2021-03-30 DIAGNOSIS — Z95.818 PRESENCE OF WATCHMAN LEFT ATRIAL APPENDAGE CLOSURE DEVICE: ICD-10-CM

## 2021-03-30 DIAGNOSIS — I48.91 ATRIAL FIBRILLATION WITH RAPID VENTRICULAR RESPONSE (HCC): ICD-10-CM

## 2021-03-30 LAB
ASCENDING AORTA: 2.5 CM
BH CV ECHO MEAS - BSA(HAYCOCK): 1.8 M^2
BH CV ECHO MEAS - BSA: 1.8 M^2
BH CV ECHO MEAS - BZI_BMI: 23.5 KILOGRAMS/M^2
BH CV ECHO MEAS - BZI_METRIC_HEIGHT: 170 CM
BH CV ECHO MEAS - BZI_METRIC_WEIGHT: 68 KG
BH CV ECHO MEAS - RAP SYSTOLE: 8 MMHG
BH CV ECHO MEAS - RVSP: 29 MMHG
BH CV ECHO MEAS - TR MAX PG: 21 MMHG
BH CV ECHO MEAS - TR MAX VEL: 227.2 CM/SEC
BH CV VAS BP RIGHT ARM: NORMAL MMHG

## 2021-03-30 PROCEDURE — 93325 DOPPLER ECHO COLOR FLOW MAPG: CPT

## 2021-03-30 PROCEDURE — 93312 ECHO TRANSESOPHAGEAL: CPT

## 2021-03-30 PROCEDURE — 93325 DOPPLER ECHO COLOR FLOW MAPG: CPT | Performed by: INTERNAL MEDICINE

## 2021-03-30 PROCEDURE — 99152 MOD SED SAME PHYS/QHP 5/>YRS: CPT | Performed by: INTERNAL MEDICINE

## 2021-03-30 PROCEDURE — 25010000002 MIDAZOLAM PER 1 MG: Performed by: INTERNAL MEDICINE

## 2021-03-30 PROCEDURE — 25010000002 FENTANYL CITRATE (PF) 100 MCG/2ML SOLUTION: Performed by: INTERNAL MEDICINE

## 2021-03-30 PROCEDURE — 93321 DOPPLER ECHO F-UP/LMTD STD: CPT

## 2021-03-30 PROCEDURE — 99152 MOD SED SAME PHYS/QHP 5/>YRS: CPT

## 2021-03-30 PROCEDURE — 93312 ECHO TRANSESOPHAGEAL: CPT | Performed by: INTERNAL MEDICINE

## 2021-03-30 PROCEDURE — 93321 DOPPLER ECHO F-UP/LMTD STD: CPT | Performed by: INTERNAL MEDICINE

## 2021-03-30 RX ORDER — FENTANYL CITRATE 50 UG/ML
INJECTION, SOLUTION INTRAMUSCULAR; INTRAVENOUS
Status: COMPLETED | OUTPATIENT
Start: 2021-03-30 | End: 2021-03-30

## 2021-03-30 RX ORDER — CLOPIDOGREL BISULFATE 75 MG/1
75 TABLET ORAL DAILY
Qty: 30 TABLET | Refills: 5 | Status: SHIPPED | OUTPATIENT
Start: 2021-03-30 | End: 2021-09-01

## 2021-03-30 RX ORDER — MIDAZOLAM HYDROCHLORIDE 1 MG/ML
INJECTION INTRAMUSCULAR; INTRAVENOUS
Status: COMPLETED | OUTPATIENT
Start: 2021-03-30 | End: 2021-03-30

## 2021-03-30 RX ADMIN — MIDAZOLAM HYDROCHLORIDE 2 MG: 1 INJECTION, SOLUTION INTRAMUSCULAR; INTRAVENOUS at 14:13

## 2021-03-30 RX ADMIN — MIDAZOLAM HYDROCHLORIDE 2 MG: 1 INJECTION, SOLUTION INTRAMUSCULAR; INTRAVENOUS at 14:16

## 2021-03-30 RX ADMIN — FENTANYL CITRATE 50 MCG: 50 INJECTION, SOLUTION INTRAMUSCULAR; INTRAVENOUS at 14:16

## 2021-03-30 RX ADMIN — FENTANYL CITRATE 50 MCG: 50 INJECTION, SOLUTION INTRAMUSCULAR; INTRAVENOUS at 14:13

## 2021-04-08 ENCOUNTER — DOCUMENTATION (OUTPATIENT)
Dept: PULMONOLOGY | Facility: CLINIC | Age: 61
End: 2021-04-08

## 2021-06-15 ENCOUNTER — OFFICE VISIT (OUTPATIENT)
Dept: INTERNAL MEDICINE | Facility: CLINIC | Age: 61
End: 2021-06-15

## 2021-06-15 VITALS
TEMPERATURE: 97.1 F | BODY MASS INDEX: 23.02 KG/M2 | DIASTOLIC BLOOD PRESSURE: 67 MMHG | WEIGHT: 147 LBS | RESPIRATION RATE: 20 BRPM | HEART RATE: 94 BPM | OXYGEN SATURATION: 94 % | SYSTOLIC BLOOD PRESSURE: 116 MMHG

## 2021-06-15 DIAGNOSIS — M25.512 ACUTE PAIN OF LEFT SHOULDER: Primary | ICD-10-CM

## 2021-06-15 DIAGNOSIS — J43.2 CENTRILOBULAR EMPHYSEMA (HCC): ICD-10-CM

## 2021-06-15 PROCEDURE — 99213 OFFICE O/P EST LOW 20 MIN: CPT | Performed by: INTERNAL MEDICINE

## 2021-06-15 RX ORDER — MELOXICAM 15 MG/1
15 TABLET ORAL DAILY
Qty: 30 TABLET | Refills: 3 | Status: SHIPPED | OUTPATIENT
Start: 2021-06-15 | End: 2021-10-18

## 2021-06-15 NOTE — PROGRESS NOTES
Subjective   Nolvia Huff is a 61 y.o. female.     History of Present Illness     The following portions of the patient's history were reviewed and updated as appropriate: allergies, current medications, past family history, past medical history, past social history, past surgical history and problem list.    Left arm pain  Duration 2 to 3 months  Patient says that she has been having worse pain in the left arm due to lifting or tranfer with the left arm.  Patient has tried NSAIDs and this has provided only minimal relief.  Pain always occurs with physical activity utilizing that left shoulder.  Patient denies any fever, chills, nausea, vomiting, headache, fatigue, shortness of breath, or any other systemic symptoms.    Cardiology Darcy Hoff     Review of Systems   All other systems reviewed and are negative.      Objective   Physical Exam  Vitals and nursing note reviewed.   Constitutional:       Appearance: Normal appearance. She is normal weight.   HENT:      Head: Normocephalic and atraumatic.      Nose: Nose normal.      Mouth/Throat:      Mouth: Mucous membranes are moist.   Eyes:      Extraocular Movements: Extraocular movements intact.      Pupils: Pupils are equal, round, and reactive to light.   Cardiovascular:      Rate and Rhythm: Normal rate.      Pulses: Normal pulses.   Pulmonary:      Effort: Pulmonary effort is normal.   Musculoskeletal:         General: Tenderness present. Normal range of motion.      Cervical back: Normal range of motion and neck supple.      Comments: Pain to palpation of the left shoulder in the deltoid region and trapezius region also pain upon manipulation of abduction and adduction   Skin:     General: Skin is warm.      Capillary Refill: Capillary refill takes less than 2 seconds.   Neurological:      General: No focal deficit present.      Mental Status: She is alert.   Psychiatric:         Mood and Affect: Mood normal.         Behavior: Behavior normal.          Thought Content: Thought content normal.         Judgment: Judgment normal.           Assessment/Plan   Diagnoses and all orders for this visit:    1. Acute pain of left shoulder (Primary)  -     meloxicam (MOBIC) 15 MG tablet; Take 1 tablet by mouth Daily.  Dispense: 30 tablet; Refill: 3    2. Centrilobular emphysema (CMS/HCC)-chronic and clinically stable, continue inhaler therapy and medical management.

## 2021-06-24 DIAGNOSIS — J43.2 CENTRILOBULAR EMPHYSEMA (HCC): ICD-10-CM

## 2021-07-06 DIAGNOSIS — J44.1 COPD EXACERBATION (HCC): ICD-10-CM

## 2021-07-06 RX ORDER — MONTELUKAST SODIUM 10 MG/1
10 TABLET ORAL NIGHTLY
Qty: 30 TABLET | Refills: 11 | Status: SHIPPED | OUTPATIENT
Start: 2021-07-06 | End: 2022-06-20

## 2021-07-12 ENCOUNTER — HOSPITAL ENCOUNTER (OUTPATIENT)
Dept: CARDIOLOGY | Facility: HOSPITAL | Age: 61
Discharge: HOME | End: 2021-07-12

## 2021-07-12 ENCOUNTER — OFFICE VISIT (OUTPATIENT)
Dept: CARDIOLOGY | Facility: HOSPITAL | Age: 61
End: 2021-07-12

## 2021-07-12 VITALS
DIASTOLIC BLOOD PRESSURE: 78 MMHG | WEIGHT: 147.5 LBS | HEART RATE: 88 BPM | OXYGEN SATURATION: 94 % | TEMPERATURE: 96.8 F | BODY MASS INDEX: 23.15 KG/M2 | HEIGHT: 67 IN | SYSTOLIC BLOOD PRESSURE: 140 MMHG | RESPIRATION RATE: 22 BRPM

## 2021-07-12 DIAGNOSIS — J44.9 CHRONIC OBSTRUCTIVE PULMONARY DISEASE, UNSPECIFIED COPD TYPE (HCC): ICD-10-CM

## 2021-07-12 DIAGNOSIS — R07.89 OTHER CHEST PAIN: ICD-10-CM

## 2021-07-12 DIAGNOSIS — Z95.818 PRESENCE OF WATCHMAN LEFT ATRIAL APPENDAGE CLOSURE DEVICE: ICD-10-CM

## 2021-07-12 DIAGNOSIS — R07.89 OTHER CHEST PAIN: Primary | ICD-10-CM

## 2021-07-12 DIAGNOSIS — I48.19 PERSISTENT ATRIAL FIBRILLATION (HCC): ICD-10-CM

## 2021-07-12 DIAGNOSIS — I50.22 CHRONIC SYSTOLIC HEART FAILURE (HCC): ICD-10-CM

## 2021-07-12 LAB
QT INTERVAL: 450 MS
QTC INTERVAL: 453 MS

## 2021-07-12 PROCEDURE — 93005 ELECTROCARDIOGRAM TRACING: CPT | Performed by: NURSE PRACTITIONER

## 2021-07-12 PROCEDURE — 93010 ELECTROCARDIOGRAM REPORT: CPT | Performed by: INTERNAL MEDICINE

## 2021-07-12 PROCEDURE — 99214 OFFICE O/P EST MOD 30 MIN: CPT | Performed by: NURSE PRACTITIONER

## 2021-07-12 NOTE — PROGRESS NOTES
"Chief Complaint  Congestive Heart Failure and Follow-up    Subjective    History of Present Illness {CC  Problem List  Visit  Diagnosis   Encounters  Notes  Medications  Labs  Result Review Imaging  Media :23}       History of Present Illness   61-year-old female presents the office today for ongoing evaluation of her nonischemic cardiomyopathy, A. Fib.  Patient reports that she has been under a lot of stress lately as she is raising her 3 grandchildren.  She reports over the last 2 weeks she has been experiencing worsening dyspnea on exertion.  Patient has COPD and has a follow-up scheduled with pulmonary in 2 weeks.  Patient also reports fatigue.  Currently denies chest pain but notes occasional chest twinges, palpitations, presyncope, syncope, pedal edema. S/p watchman.   IRMA showed EF of 51 to 55% up from 26 to 30%.  Objective     Vital Signs:   Vitals:    07/12/21 0838 07/12/21 0929   BP: 153/84 140/78   BP Location: Left arm Left arm   Patient Position: Sitting Sitting   Cuff Size: Adult    Pulse: 88    Resp: 22    Temp: 96.8 °F (36 °C)    TempSrc: Temporal    SpO2: 94%    Weight: 66.9 kg (147 lb 8 oz)    Height: 170.2 cm (67\")      Body mass index is 23.1 kg/m².  Physical Exam  Vitals and nursing note reviewed.   Constitutional:       Appearance: Normal appearance.   HENT:      Head: Normocephalic.   Eyes:      Pupils: Pupils are equal, round, and reactive to light.   Cardiovascular:      Rate and Rhythm: Normal rate and regular rhythm.      Pulses: Normal pulses.      Heart sounds: Normal heart sounds. No murmur heard.     Pulmonary:      Effort: Pulmonary effort is normal.      Breath sounds: Normal breath sounds.   Abdominal:      General: Bowel sounds are normal.      Palpations: Abdomen is soft.   Musculoskeletal:         General: Normal range of motion.      Cervical back: Normal range of motion.      Right lower leg: No edema.      Left lower leg: No edema.   Skin:     General: Skin is warm " and dry.      Capillary Refill: Capillary refill takes less than 2 seconds.   Neurological:      Mental Status: She is alert and oriented to person, place, and time.   Psychiatric:         Mood and Affect: Mood normal.         Thought Content: Thought content normal.              Result Review  Data Reviewed:{ Labs  Result Review  Imaging  Med Tab  Media :23}   Basic metabolic panel (02/05/2021 09:14)  CBC (No diff) (02/05/2021 09:14)  Adult Transesophageal Echo (IRMA) W/ Cont if Necessary Per Protocol (03/30/2021 15:33)               Assessment and Plan {CC Problem List  Visit Diagnosis  ROS  Review (Popup)  Health Maintenance  Quality  BestPractice  Medications  SmartSets  SnapShot Encounters  Media :23}   1. Other chest pain  Patient describes them as chest twinges under severe stress.  Discussed stress testing but patient would like to defer at this time  - ECG 12 Lead; Future    2. Chronic systolic heart failure (CMS/HCC)  Euvolemic  Normalized EF  Suspect tachycardia induced cardiomyopathy  3. Persistent atrial fibrillation (CMS/HCC)  S/p Watchman      4. Presence of Watchman left atrial appendage closure device  Continue asa and plavix    5. Chronic obstructive pulmonary disease, unspecified COPD type (CMS/HCC)  Upcoming appt with pulmonary in 2 weeks     Patient missed follow up appt with Dr Solomon, will schedule one in near future    Follow Up {Instructions Charge Capture  Follow-up Communications :23}   Return in about 4 months (around 11/12/2021) for Office visit, AFIB, HTN.    Patient was given instructions and counseling regarding her condition or for health maintenance advice. Please see specific information pulled into the AVS if appropriate.  Patient was instructed to call the [facility] with any questions, concerns, or worsening symptoms.    *Please note that portions of this note were completed with a voice recognition program. Efforts were made to edit the dictations,  but occasionally words are mistranscribed.

## 2021-07-22 ENCOUNTER — TELEPHONE (OUTPATIENT)
Dept: INTERNAL MEDICINE | Facility: CLINIC | Age: 61
End: 2021-07-22

## 2021-07-22 DIAGNOSIS — J43.2 CENTRILOBULAR EMPHYSEMA (HCC): ICD-10-CM

## 2021-07-22 RX ORDER — IPRATROPIUM BROMIDE AND ALBUTEROL SULFATE 2.5; .5 MG/3ML; MG/3ML
3 SOLUTION RESPIRATORY (INHALATION) 4 TIMES DAILY PRN
Qty: 360 ML | Refills: 3 | Status: SHIPPED | OUTPATIENT
Start: 2021-07-22 | End: 2021-10-04 | Stop reason: SDUPTHER

## 2021-07-22 NOTE — TELEPHONE ENCOUNTER
Spoke with the patient, she didn't ask the pharmacy to call and ask about this. She is okay with her Meloxicam dosage.

## 2021-07-22 NOTE — TELEPHONE ENCOUNTER
Pharmacy Name: [facility] - [facility] [address] - [phone] [facility] [phone]      Pharmacy representative name: TIEN    Pharmacy representative phone number: [phone]    What medication are you calling in regards to: MELOXICAM    What question does the pharmacy have:TIEN IS WANTING TO KNOW DR LUIS OPINION ON LOWERING THE DOSE OF THE MELOXICAM FOR THE PATIENT    Who is the provider that prescribed the medication: DR MCCORMICK    Additional notes:

## 2021-07-22 NOTE — TELEPHONE ENCOUNTER
Is there any particular reason for decreasing dosage?  Is patient having side effects from the medication?

## 2021-07-26 ENCOUNTER — OFFICE VISIT (OUTPATIENT)
Dept: PULMONOLOGY | Facility: CLINIC | Age: 61
End: 2021-07-26

## 2021-07-26 VITALS
SYSTOLIC BLOOD PRESSURE: 138 MMHG | WEIGHT: 150 LBS | BODY MASS INDEX: 23.54 KG/M2 | DIASTOLIC BLOOD PRESSURE: 86 MMHG | HEART RATE: 80 BPM | TEMPERATURE: 98.7 F | HEIGHT: 67 IN | OXYGEN SATURATION: 94 %

## 2021-07-26 DIAGNOSIS — J96.21 ACUTE ON CHRONIC RESPIRATORY FAILURE WITH HYPOXIA AND HYPERCAPNIA (HCC): ICD-10-CM

## 2021-07-26 DIAGNOSIS — Z87.891 PERSONAL HISTORY OF TOBACCO USE, PRESENTING HAZARDS TO HEALTH: ICD-10-CM

## 2021-07-26 DIAGNOSIS — K21.9 GASTROESOPHAGEAL REFLUX DISEASE WITHOUT ESOPHAGITIS: ICD-10-CM

## 2021-07-26 DIAGNOSIS — J96.22 ACUTE ON CHRONIC RESPIRATORY FAILURE WITH HYPOXIA AND HYPERCAPNIA (HCC): ICD-10-CM

## 2021-07-26 DIAGNOSIS — J44.1 COPD WITH ACUTE EXACERBATION (HCC): Primary | ICD-10-CM

## 2021-07-26 PROCEDURE — 99214 OFFICE O/P EST MOD 30 MIN: CPT | Performed by: NURSE PRACTITIONER

## 2021-07-26 RX ORDER — AZITHROMYCIN 250 MG/1
TABLET, FILM COATED ORAL
Qty: 6 TABLET | Refills: 0 | Status: SHIPPED | OUTPATIENT
Start: 2021-07-26 | End: 2021-08-23

## 2021-07-26 RX ORDER — PREDNISONE 10 MG/1
TABLET ORAL
Qty: 31 TABLET | Refills: 0 | Status: SHIPPED | OUTPATIENT
Start: 2021-07-26 | End: 2021-08-23

## 2021-07-26 NOTE — PROGRESS NOTES
[facility] Pulmonary Follow up    CHIEF COMPLAINT    COPD    HISTORY OF PRESENT ILLNESS    Nolvia Huff is a 61 y.o.female here today for follow-up of her COPD.  She was last seen in the office by me in October.  She states for the last month she has had worsening congestion and shortness of breath.  She does feel that she is having an exacerbation currently.  Since her last appointment she has had a watchman device placed per Dr. Solomon.  She remains on aspirin and Plavix.  She continues to use Breo and Spiriva daily.  She also has DuoNebs to use as needed.  She has been using these about 2-3 times per days over the last couple of weeks.  She continues to be short of breath with exertion but does recover fairly quickly at rest.    She denies fever, chills, sputum production, hemoptysis, night sweats, weight loss, chest pain or palpitations.She denies any lower extremity edema or calf tenderness.  She will occasionally have some sinus or allergy symptoms.  She takes over-the-counter medication as needed.  She denies reflux symptoms.  She does take omeprazole daily.    She is up-to-date on her current vaccinations.    She quit smoking in 2004 and has a 60-pack-year history.  Her last CT scan was in February 2021 and this showed no nodule or mass concerning for malignancy.        Patient Active Problem List   Diagnosis   • Tobacco abuse   • Anxiety disorder   • Dyslipidemia   • Emphysema/COPD (CMS/HCC)   • GERD    • Osteoporosis   • Personal history of tobacco use, presenting hazards to health   • COPD with acute exacerbation   • Nocturnal hypoxia on 2L NC    • Pulmonary emphysema (CMS/HCC)   • A/C Respiratory Failure Type 2   • AF w/RVR    • Former smoker   • Atrial fibrillation with rapid ventricular response (CMS/HCC)   • Acute systolic CHF (congestive heart failure) (CMS/HCC)   • Complete heart block (CMS/HCC)   • MRI safe cardiac pacemaker in situ       Allergies   Allergen Reactions   • Penicillins Anaphylaxis    • Tikosyn [Dofetilide] Other (See Comments)     Torsades       Current Outpatient Medications:   •  aspirin 81 MG EC tablet, Take 1 tablet by mouth Daily., Disp: 30 tablet, Rfl: 11  •  clopidogrel (PLAVIX) 75 MG tablet, Take 1 tablet by mouth Daily., Disp: 30 tablet, Rfl: 5  •  Fluticasone Furoate-Vilanterol (Breo Ellipta) 200-25 MCG/INH inhaler, Inhale 1 puff Daily., Disp: 60 each, Rfl: 6  •  guaiFENesin (MUCINEX) 600 MG 12 hr tablet, Take 1 tablet by mouth 2 (Two) Times a Day. (Patient taking differently: Take 600 mg by mouth Daily As Needed.), Disp: 60 tablet, Rfl: 6  •  ipratropium-albuterol (DUO-NEB) 0.5-2.5 mg/3 ml nebulizer, Take 3 mL by nebulization 4 (Four) Times a Day As Needed for Wheezing., Disp: 360 mL, Rfl: 3  •  meloxicam (MOBIC) 15 MG tablet, Take 1 tablet by mouth Daily., Disp: 30 tablet, Rfl: 3  •  montelukast (SINGULAIR) 10 MG tablet, Take 1 tablet by mouth Every Night., Disp: 30 tablet, Rfl: 11  •  O2 (OXYGEN), Inhale 2 L/min Every Night., Disp: , Rfl:   •  omeprazole (priLOSEC) 40 MG capsule, Take 1 capsule by mouth Daily. (Patient taking differently: Take 40 mg by mouth As Needed.), Disp: 30 capsule, Rfl: 11  •  ProAir  (90 Base) MCG/ACT inhaler, INHALE TWO PUFFS EVERY 4 HOURS AS NEEDED FOR wheezing, Disp: 8.5 g, Rfl: 2  •  tiotropium (Spiriva HandiHaler) 18 MCG per inhalation capsule, Place 1 capsule into inhaler and inhale Daily., Disp: 90 capsule, Rfl: 3  •  azithromycin (ZITHROMAX) 250 MG tablet, Take 2 by mouth today then 1 daily for 4 days, Disp: 6 tablet, Rfl: 0  •  predniSONE (DELTASONE) 10 MG tablet, Take 4 tabs daily x 3 days, then take 3 tabs daily x 3 days, then take 2 tabs daily x 3 days, then take 1 tab daily x 3 days, Disp: 31 tablet, Rfl: 0  MEDICATION LIST AND ALLERGIES REVIEWED.    Social History     Tobacco Use   • Smoking status: Former Smoker     Packs/day: 2.00     Years: 30.00     Pack years: 60.00     Types: Cigarettes     Quit date: 2004     Years since  "quittin.5   • Smokeless tobacco: Never Used   • Tobacco comment: fomer smoker 2 ppd x 30 years-Still smoke 1 cig, sporadically   Vaping Use   • Vaping Use: Never used   Substance Use Topics   • Alcohol use: Not Currently   • Drug use: Yes     Types: Marijuana     Comment: occasionally smoked marijuana       FAMILY AND SOCIAL HISTORY REVIEWED.    Review of Systems   Constitutional: Positive for fatigue. Negative for activity change, appetite change, fever and unexpected weight change.   HENT: Positive for congestion. Negative for postnasal drip, rhinorrhea, sinus pressure, sore throat and voice change.    Eyes: Negative for visual disturbance.   Respiratory: Positive for cough and shortness of breath. Negative for chest tightness and wheezing.    Cardiovascular: Negative for chest pain, palpitations and leg swelling.   Gastrointestinal: Negative for abdominal distention, abdominal pain, nausea and vomiting.   Endocrine: Negative for cold intolerance and heat intolerance.   Genitourinary: Negative for difficulty urinating and urgency.   Musculoskeletal: Negative for arthralgias, back pain and neck pain.   Skin: Negative for color change and pallor.   Allergic/Immunologic: Negative for environmental allergies and food allergies.   Neurological: Negative for dizziness, syncope, weakness and light-headedness.   Hematological: Negative for adenopathy. Does not bruise/bleed easily.   Psychiatric/Behavioral: Negative for agitation and behavioral problems.   .    /86   Pulse 80   Temp 98.7 °F (37.1 °C)   Ht 170.2 cm (67\")   Wt 68 kg (150 lb)   LMP  (LMP Unknown) Comment: last mammogram   SpO2 94% Comment: room air  at  rest  BMI 23.49 kg/m²     Immunization History   Administered Date(s) Administered   • COVID-19 (PFIZER) 2021   • Flu Vaccine Quad PF >36MO 10/04/2016, 2019   • Flulaval/Fluarix/Fluzone Quad 10/22/2020   • Influenza TIV (IM) 10/20/2008   • Influenza, Unspecified 10/04/2016   • " Pneumococcal Polysaccharide (PPSV23) 10/22/2020   • Tdap 06/29/2010, 06/29/2010       Physical Exam  Vitals and nursing note reviewed.   Constitutional:       Appearance: She is well-developed. She is not diaphoretic.   HENT:      Head: Normocephalic and atraumatic.   Eyes:      Pupils: Pupils are equal, round, and reactive to light.   Neck:      Thyroid: No thyromegaly.   Cardiovascular:      Rate and Rhythm: Normal rate and regular rhythm.      Heart sounds: Normal heart sounds. No murmur heard.   No friction rub. No gallop.    Pulmonary:      Effort: Pulmonary effort is normal. No respiratory distress.      Breath sounds: Normal breath sounds. No wheezing or rales.   Chest:      Chest wall: No tenderness.   Abdominal:      General: Bowel sounds are normal.      Palpations: Abdomen is soft.      Tenderness: There is no abdominal tenderness.   Musculoskeletal:         General: No swelling. Normal range of motion.      Cervical back: Normal range of motion and neck supple.   Lymphadenopathy:      Cervical: No cervical adenopathy.   Skin:     General: Skin is warm and dry.      Capillary Refill: Capillary refill takes less than 2 seconds.   Neurological:      Mental Status: She is alert and oriented to person, place, and time.   Psychiatric:         Mood and Affect: Mood normal.         Behavior: Behavior normal.           RESULTS      PROBLEM LIST    Problem List Items Addressed This Visit        Gastrointestinal Abdominal     GERD        Pulmonary and Pneumonias    COPD with acute exacerbation - Primary    Relevant Medications    predniSONE (DELTASONE) 10 MG tablet    azithromycin (ZITHROMAX) 250 MG tablet    A/C Respiratory Failure Type 2       Tobacco    Personal history of tobacco use, presenting hazards to health            DISCUSSION    Ms. Huff was here for follow-up of her COPD.  She does appear to be having a COPD exacerbation I will treat with antibiotics and prednisone taper.  I did advise her that if  her breathing were to worsen she needs to present to the ED for further evaluation.    She will continue Breo and Spiriva daily for COPD.    She no longer meets requirements for CT screening of lung cancer due to her quitting smoking over 15 years ago.  We will get a chest x-ray at her next appointment.    She will follow-up in 6 months or sooner if her symptoms worsen.  She will call with any additional concerns or questions.    Level of service justified based on 36 minutes spent in patient care on this date of service including, but not limited to: preparing to see the patient, obtaining and/or reviewing history, performing medically appropriate examination, ordering tests/medicine/procedures, independently interpreting results, documenting clinical information in EHR, and counseling/education of patient/family/caregiver. (Level 4 30-39 minutes; Level 5 40-54 minutes)      Maryellen Higuera, HERNAN  07/26/202112:50 EDT  Electronically signed     Please note that portions of this note were completed with a voice recognition program. Efforts were made to edit the dictations, but occasionally words are mistranscribed.      CC: Alfonso Denney MD

## 2021-07-30 DIAGNOSIS — J43.2 CENTRILOBULAR EMPHYSEMA (HCC): ICD-10-CM

## 2021-07-30 RX ORDER — TIOTROPIUM BROMIDE 18 UG/1
CAPSULE ORAL; RESPIRATORY (INHALATION)
Qty: 90 CAPSULE | Refills: 3 | Status: SHIPPED | OUTPATIENT
Start: 2021-07-30 | End: 2021-10-04 | Stop reason: SDUPTHER

## 2021-08-23 ENCOUNTER — OFFICE VISIT (OUTPATIENT)
Dept: CARDIOLOGY | Facility: CLINIC | Age: 61
End: 2021-08-23

## 2021-08-23 VITALS
SYSTOLIC BLOOD PRESSURE: 124 MMHG | HEART RATE: 88 BPM | DIASTOLIC BLOOD PRESSURE: 68 MMHG | WEIGHT: 148 LBS | HEIGHT: 67 IN | BODY MASS INDEX: 23.23 KG/M2 | OXYGEN SATURATION: 94 %

## 2021-08-23 DIAGNOSIS — Z95.0 CARDIAC PACEMAKER IN SITU: ICD-10-CM

## 2021-08-23 DIAGNOSIS — Z95.818 PRESENCE OF WATCHMAN LEFT ATRIAL APPENDAGE CLOSURE DEVICE: ICD-10-CM

## 2021-08-23 DIAGNOSIS — I48.21 PERMANENT ATRIAL FIBRILLATION (HCC): Primary | ICD-10-CM

## 2021-08-23 PROCEDURE — 93279 PRGRMG DEV EVAL PM/LDLS PM: CPT | Performed by: NURSE PRACTITIONER

## 2021-08-23 PROCEDURE — 99213 OFFICE O/P EST LOW 20 MIN: CPT | Performed by: NURSE PRACTITIONER

## 2021-08-23 NOTE — PROGRESS NOTES
Cardiac Electrophysiology Outpatient Follow Up Note            [facility]    Follow Up Office Visit      Nolvia Huff  [id number]  08/23/2021  [unfilled]  [unfilled]    Primary Care Physician: Alfonso Denney MD    Referred By: No ref. provider found    Subjective     Chief Complaint:   Diagnoses and all orders for this visit:    1. Permanent atrial fibrillation (CMS/HCC) (Primary)    2. Presence of Watchman left atrial appendage closure device    3. Cardiac pacemaker in situ      Chief Complaint   Patient presents with   • Complete heart block (CMS/HCC)     PROBLEM LIST:       1. Persistent Atrial fibrillation: Sinus node dysfunction  1. CHADSVASC = 3  2. New onset, 9/21/2020  3. Echo 9/21/2020: EF 26 to 30%, left atrial cavity moderately dilated, small PFO with right to left shunting, borderline RV dilation, moderately reduced RV systolic function, moderate TR, RVSP 45 to 55 mmHg.  4. All antiarrhythmic drugs deemed contraindicated due to torsade w/ Tikosyn and structural heart disease with moderate PFO with right to left shunting on IRMA September 2020.  5. Medtronic Micra VR Leadless PM implant 9/30/2020 with AVN ablation  6. Watchman LAAC device implant 2/9/2021  7. Follow-up IRMA 3/30/2021 with no peridevice leak and transition to aspirin and Plavix therapy, EF 51-55%  2. Dyslipidemia  3. COPD  4. Former tobacco user  5. Anxiety disorder  6. GERD      History of Present Illness:   Nolvia Huff is a 61 y.o. female who presents to my electrophysiology clinic for follow up of her sinus node dysfunction status post leadless permanent pacemaker implant with AV node ablation and treatment for her persistent atrial fibrillation.  Patient now status post watchman device implant February 2021 with transition to aspirin and Plavix therapy status post acceptable IRMA in March 2021.  Upon evaluation patient reports doing well overall from a cardiovascular  standpoint.  Patient denies chest pain, palpitations, dizziness, presyncope, or syncope.  Patient denies TIA/strokelike symptoms.  Patient blood pressure is acceptable in office today without documented history of hypertension.       Review of Systems:   Constitutional: No fevers or chills, no recent weight gain or weight loss or fatigue  Eyes: No visual loss, blurred vision, double vision, yellow sclerae.  ENT: No headaches, hearing loss, vertigo, congestion or sore throat.   Cardiovascular: Per HPI  Respiratory: No cough or wheezing, no sputum production, no hematemesis   Gastrointestinal: No abdominal pain, no nausea, vomiting, constipation, diarrhea, melena.   Genitourinary: No dysuria, hematuria or increased frequency.  Musculoskeletal:  No gait disturbance, weakness or joint pain or stiffness  Integumentary: No rashes, urticaria, ulcers or sores.   Neurological: No headache, dizziness, syncope, paralysis, ataxia, no prior CVA/TIA  Psychiatric: No anxiety, or depression  Endocrine: No diaphoresis, cold or heat intolerance. No polyuria or polydipsia.   Hematologic/Lymphatic: No anemia, abnormal bruising or bleeding. No history of DVT/PE.     Past Medical History:   Past Medical History:   Diagnosis Date   • Acute otitis media     H/o   • Atrial fibrillation (CMS/HCC)    • COPD (chronic obstructive pulmonary disease) (CMS/HCC)     inhalers prn    • Dependence on nocturnal oxygen therapy     2-3 liters at night and daytime use during summer months    • GERD (gastroesophageal reflux disease)    • H/O chest x-ray 10/04/2011    Cardiac silhouute normal limits. The pulmonary vasculature appears normal, The diaphragms are flattened bilaterally which is sug of COPD. Lungs are grossly clear bilaterally with no prominent nodules or masses seen. No change when compared to previous exam from 11/10/09   • H/O chest x-ray 05/31/2011    Chronic change. No active disease   • H/O chest x-ray 11/10/2009    Moderate degenerative  changes of thoracic spine. Cardiac silhoutte appears normal. There is slight hyperexpansion. There were a few scattered calcified granulomas. No acute appearing infiltrates noted. no real change compared to 10/20/08   • History of PFTs 2015    Severe OAD w/ dec in FEV from prior. no resp to BDRX. MVV reduced. FVC reduced; sugg restricitve pattern   • History of PFTs 10/21/2013    Mod severe OAD, FVC normal, no restriction, MVV reduced   • History of PFTs 2012    Severe OAD, FVC reduced, MVV dec   • Normal stillborn     Pt had stillborn infant   • Pacemaker        Past Surgical History:   Past Surgical History:   Procedure Laterality Date   • ATRIAL APPENDAGE EXCLUSION LEFT WITH TRANSESOPHAGEAL ECHOCARDIOGRAM N/A 2021    Procedure: Atrial Appendage Occlusion (Watchman), on Eliquis hold 2 doses;  Surgeon: Maximus Solomon DO;  Location:  [facility];  Service: Cardiology;  Laterality: N/A;   • BREAST CYST ASPIRATION Right    • CARDIAC ELECTROPHYSIOLOGY PROCEDURE N/A 2020    Procedure: MICRA PACEMAKER IMPLANTATION + AVN;  Surgeon: Maximus Solomon DO;  Location:  [facility];  Service: Cardiology;  Laterality: N/A;   • COLONOSCOPY     • HYSTERECTOMY     • TONSILLECTOMY         Family History:   Family History   Problem Relation Age of Onset   • Emphysema Mother    • Cancer Mother         laryngeal carcinoma   • Heart murmur Father    • Cerebral palsy Sister    • Breast cancer Sister    • No Known Problems Son    • Cancer Maternal Grandfather         laryngeal carcinoma.   • Asthma Brother    • Heart murmur Brother    • Heart disease Maternal Grandmother    • No Known Problems Paternal Grandmother    • No Known Problems Paternal Grandfather    • Other Son          at birth       Social History:   Social History     Socioeconomic History   • Marital status:      Spouse name: Not on file   • Number of children: Not on file   • Years of education: Not on file    • Highest education level: Not on file   Tobacco Use   • Smoking status: Former Smoker     Packs/day: 2.00     Years: 30.00     Pack years: 60.00     Types: Cigarettes     Quit date:      Years since quittin.6   • Smokeless tobacco: Never Used   • Tobacco comment: fomer smoker 2 ppd x 30 years-Still smoke 1 cig, sporadically   Vaping Use   • Vaping Use: Never used   Substance and Sexual Activity   • Alcohol use: Not Currently   • Drug use: Yes     Types: Marijuana     Comment: occasionally smoked marijuana   • Sexual activity: Defer       Medications:     Current Outpatient Medications:   •  aspirin 81 MG EC tablet, Take 1 tablet by mouth Daily., Disp: 30 tablet, Rfl: 11  •  Breo Ellipta 200-25 MCG/INH inhaler, Inhale 1 puff Daily., Disp: 60 each, Rfl: 6  •  clopidogrel (PLAVIX) 75 MG tablet, Take 1 tablet by mouth Daily., Disp: 30 tablet, Rfl: 5  •  guaiFENesin (MUCINEX) 600 MG 12 hr tablet, Take 1 tablet by mouth 2 (Two) Times a Day. (Patient taking differently: Take 600 mg by mouth Daily As Needed.), Disp: 60 tablet, Rfl: 6  •  ipratropium-albuterol (DUO-NEB) 0.5-2.5 mg/3 ml nebulizer, Take 3 mL by nebulization 4 (Four) Times a Day As Needed for Wheezing., Disp: 360 mL, Rfl: 3  •  meloxicam (MOBIC) 15 MG tablet, Take 1 tablet by mouth Daily., Disp: 30 tablet, Rfl: 3  •  montelukast (SINGULAIR) 10 MG tablet, Take 1 tablet by mouth Every Night., Disp: 30 tablet, Rfl: 11  •  O2 (OXYGEN), Inhale 2 L/min Every Night., Disp: , Rfl:   •  omeprazole (priLOSEC) 40 MG capsule, Take 1 capsule by mouth Daily. (Patient taking differently: Take 40 mg by mouth As Needed.), Disp: 30 capsule, Rfl: 11  •  ProAir  (90 Base) MCG/ACT inhaler, INHALE TWO PUFFS EVERY 4 HOURS AS NEEDED FOR wheezing, Disp: 8.5 g, Rfl: 2  •  Spiriva HandiHaler 18 MCG per inhalation capsule, Place 1 capsule into inhaler and inhale Daily., Disp: 90 capsule, Rfl: 3    Allergies:   Allergies   Allergen Reactions   • Penicillins Anaphylaxis  "  • Tikosyn [Dofetilide] Other (See Comments)     Torsades       Objective   Vital Signs:   Vitals:    08/23/21 1044   BP: 124/68   BP Location: Left arm   Patient Position: Sitting   Pulse: 88   SpO2: 94%   Weight: 67.1 kg (148 lb)   Height: 170.2 cm (67\")       PHYSICAL EXAM  General appearance: Awake, alert, cooperative  Head: Normocephalic, without obvious abnormality, atraumatic  Eyes: Conjunctivae/corneas clear, EOMs intact  Neck: no adenopathy, no carotid bruit, no JVD and thyroid: not enlarged  Lungs: clear to auscultation bilaterally and no rhonchi or crackles\", ' symmetric  Heart: regular rate and rhythm, S1, S2 normal, no murmur, click, rub or gallop  Abdomen: Soft, non-tender, bowel sounds normal,  no organomegaly  Extremities: extremities normal, atraumatic, no cyanosis or edema  Skin: Skin color, turgor normal, no rashes or lesions  Neurologic: Grossly normal     Lab Results   Component Value Date    GLUCOSE 116 (H) 02/05/2021    CALCIUM 9.5 02/05/2021     02/05/2021    K 4.5 02/05/2021    CO2 28.0 02/05/2021     02/05/2021    BUN 9 02/05/2021    CREATININE 0.68 02/05/2021    EGFRIFNONA 87 02/05/2021    BCR 13.2 02/05/2021    ANIONGAP 8.0 02/05/2021     Lab Results   Component Value Date    WBC 7.28 02/05/2021    HGB 15.4 02/05/2021    HCT 47.1 (H) 02/05/2021    MCV 89.4 02/05/2021     02/05/2021     Lab Results   Component Value Date    INR 1.14 09/21/2020    PROTIME 14.3 (H) 09/21/2020     Lab Results   Component Value Date    TSH 5.430 (H) 09/21/2020       Assessment & Plan    Diagnoses and all orders for this visit:    1. Permanent atrial fibrillation (CMS/HCC) (Primary)    2. Presence of Watchman left atrial appendage closure device    3. Cardiac pacemaker in situ         Diagnosis Plan   1. Permanent atrial fibrillation (CMS/HCC)  .  He had controlled status post AV node ablation with normal pacemaker function on device interrogation in office today.     2. Presence of Watchman " left atrial appendage closure device  .  Status post watchman device implant February 2021 with acceptable follow-up IRMA and transition to aspirin Plavix therapy.  Patient instructed she will be contacted by watchman device coronary when to transition to aspirin monotherapy.     3. Cardiac pacemaker in situ  .  Device interrogation: Medtronic Micra VR leadless pacemaker at VVIR 60 ppm RV paced 98% with acceptable lead threshold impedance with underlying rhythm atrial fibrillation with third-degree AV block and 8 years remaining on battery voltage.      This patient's Cardiac Implanted Electronic Device was manually interrogated and reprogrammed during the patient encounter today.  Iterative programming changes were manually made to determine the sensing threshold, pacing threshold, lead impedance as well as underlying cardiac rhythm.  These programming changes were not limited to but included some or all of the following when appropriate: pacing mode, programmed AV delays, blanking periods, and refractory periods.  Data obtained as a result of these manual programing changes informed the patient's CIED permanent programming.        Body mass index is 23.18 kg/m².    I spent 19 minutes in consultation with this patient which included more than 65% of this time in direct face-to-face counseling, physical examination and discussion of my assessment and findings and shared decision making with the patient.  The remainder of the time not spent face to face was performing one, some or all of the following actions:  preparing to see this patient ( eg. Review of tests),  ordering medications, tests or procedures ), care coordination, discussion of the plan with other healthcare providers, documenting clinical information in Epic well as independently interpreting results and communicating results to patient, family and or caregiver.  All time noted occurred on the date of service.    Follow Up: 6-month follow-up with  Medtronic device check      Thank you for allowing me to participate in the care of your patient. Please to not hesitate to contact me with additional questions or concerns.        Electronically signed by HERNAN Case, 08/23/21, 11:27 AM EDT.   [facility] / [facility]

## 2021-09-01 DIAGNOSIS — I48.21 PERMANENT ATRIAL FIBRILLATION (HCC): ICD-10-CM

## 2021-09-01 DIAGNOSIS — I48.91 ATRIAL FIBRILLATION WITH RAPID VENTRICULAR RESPONSE (HCC): Primary | ICD-10-CM

## 2021-09-01 DIAGNOSIS — Z95.818 PRESENCE OF WATCHMAN LEFT ATRIAL APPENDAGE CLOSURE DEVICE: ICD-10-CM

## 2021-09-17 RX ORDER — CLOPIDOGREL BISULFATE 75 MG/1
75 TABLET ORAL DAILY
Qty: 30 TABLET | Refills: 5 | Status: SHIPPED | OUTPATIENT
Start: 2021-09-17 | End: 2021-10-04

## 2021-10-04 ENCOUNTER — OFFICE VISIT (OUTPATIENT)
Dept: PULMONOLOGY | Facility: CLINIC | Age: 61
End: 2021-10-04

## 2021-10-04 VITALS
RESPIRATION RATE: 16 BRPM | DIASTOLIC BLOOD PRESSURE: 86 MMHG | TEMPERATURE: 96.9 F | OXYGEN SATURATION: 96 % | SYSTOLIC BLOOD PRESSURE: 130 MMHG | WEIGHT: 144 LBS | HEIGHT: 67 IN | BODY MASS INDEX: 22.6 KG/M2 | HEART RATE: 62 BPM

## 2021-10-04 DIAGNOSIS — J96.21 ACUTE ON CHRONIC RESPIRATORY FAILURE WITH HYPOXIA AND HYPERCAPNIA (HCC): ICD-10-CM

## 2021-10-04 DIAGNOSIS — G47.34 NOCTURNAL HYPOXIA: ICD-10-CM

## 2021-10-04 DIAGNOSIS — J96.22 ACUTE ON CHRONIC RESPIRATORY FAILURE WITH HYPOXIA AND HYPERCAPNIA (HCC): ICD-10-CM

## 2021-10-04 DIAGNOSIS — J43.9 PULMONARY EMPHYSEMA, UNSPECIFIED EMPHYSEMA TYPE (HCC): Primary | ICD-10-CM

## 2021-10-04 DIAGNOSIS — K21.9 GASTROESOPHAGEAL REFLUX DISEASE WITHOUT ESOPHAGITIS: ICD-10-CM

## 2021-10-04 DIAGNOSIS — J43.2 CENTRILOBULAR EMPHYSEMA (HCC): ICD-10-CM

## 2021-10-04 DIAGNOSIS — Z72.0 TOBACCO ABUSE: ICD-10-CM

## 2021-10-04 DIAGNOSIS — J44.1 COPD WITH ACUTE EXACERBATION (HCC): ICD-10-CM

## 2021-10-04 DIAGNOSIS — Z95.818 PRESENCE OF WATCHMAN LEFT ATRIAL APPENDAGE CLOSURE DEVICE: ICD-10-CM

## 2021-10-04 DIAGNOSIS — Z87.891 PERSONAL HISTORY OF SMOKING: ICD-10-CM

## 2021-10-04 PROCEDURE — 99214 OFFICE O/P EST MOD 30 MIN: CPT | Performed by: NURSE PRACTITIONER

## 2021-10-04 RX ORDER — PREDNISONE 10 MG/1
TABLET ORAL
Qty: 31 TABLET | Refills: 0 | Status: SHIPPED | OUTPATIENT
Start: 2021-10-04 | End: 2021-12-22 | Stop reason: SDUPTHER

## 2021-10-04 RX ORDER — DOXYCYCLINE HYCLATE 100 MG
100 TABLET ORAL 2 TIMES DAILY
Qty: 20 TABLET | Refills: 0 | Status: SHIPPED | OUTPATIENT
Start: 2021-10-04 | End: 2021-12-22 | Stop reason: SDUPTHER

## 2021-10-04 RX ORDER — IPRATROPIUM BROMIDE AND ALBUTEROL SULFATE 2.5; .5 MG/3ML; MG/3ML
3 SOLUTION RESPIRATORY (INHALATION) 4 TIMES DAILY PRN
Qty: 360 ML | Refills: 3 | Status: SHIPPED | OUTPATIENT
Start: 2021-10-04 | End: 2022-09-16

## 2021-10-04 NOTE — PROGRESS NOTES
[facility] Pulmonary Follow up    CHIEF COMPLAINT    COPD    HISTORY OF PRESENT ILLNESS    Nolvia Huff is a 61 y.o.female here today for follow-up of her COPD.  She was last seen in the office by me in July.  She was treated for COPD exacerbation at that time.  She states that she never fully recovered.  She is continued to have more shortness of breath with exertion.    She continues to use Breo and Spiriva daily.  She also has DuoNebs that she uses 3-4 times per day.  She does have shortness of breath with any exertion.  She states that she tries to stay fairly active taking care of her grandbaby.    She denies fever, chills, sputum production, hemoptysis, night sweats, weight loss, chest pain or palpitations.She denies any lower extremity edema or calf tenderness.  She will occasionally have some sinus or allergy symptoms.  She takes over-the-counter medication as needed.  She denies reflux symptoms.  She does take omeprazole daily.     She is up-to-date on her current vaccinations.     She quit smoking in 2004 and has a 60-pack-year history.  Her last CT scan was in February 2021 and this showed no nodule or mass concerning for malignancy.    She continues to follow with cardiology for her history of atrial fibrillation.    Patient Active Problem List   Diagnosis   • Tobacco abuse   • Anxiety disorder   • Dyslipidemia   • Emphysema/COPD (HCC)   • GERD    • Osteoporosis   • Personal history of tobacco use, presenting hazards to health   • COPD with acute exacerbation   • Nocturnal hypoxia on 2L NC    • Pulmonary emphysema (HCC)   • A/C Respiratory Failure Type 2   • AF w/RVR    • Former smoker   • Atrial fibrillation with rapid ventricular response (HCC)   • Acute systolic CHF (congestive heart failure) (HCC)   • Complete heart block (HCC)   • Cardiac pacemaker in situ   • Permanent atrial fibrillation (HCC)   • Presence of Watchman left atrial appendage closure device       Allergies   Allergen Reactions   •  Penicillins Anaphylaxis   • Tikosyn [Dofetilide] Other (See Comments)     Torsades       Current Outpatient Medications:   •  aspirin 81 MG EC tablet, Take 1 tablet by mouth Daily., Disp: 30 tablet, Rfl: 11  •  Fluticasone Furoate-Vilanterol (Breo Ellipta) 200-25 MCG/INH inhaler, Inhale 1 puff Daily., Disp: 60 each, Rfl: 6  •  guaiFENesin (MUCINEX) 600 MG 12 hr tablet, Take 1 tablet by mouth 2 (Two) Times a Day. (Patient taking differently: Take 600 mg by mouth Daily As Needed.), Disp: 60 tablet, Rfl: 6  •  ipratropium-albuterol (DUO-NEB) 0.5-2.5 mg/3 ml nebulizer, Take 3 mL by nebulization 4 (Four) Times a Day As Needed for Wheezing., Disp: 360 mL, Rfl: 3  •  meloxicam (MOBIC) 15 MG tablet, Take 1 tablet by mouth Daily., Disp: 30 tablet, Rfl: 3  •  montelukast (SINGULAIR) 10 MG tablet, Take 1 tablet by mouth Every Night., Disp: 30 tablet, Rfl: 11  •  O2 (OXYGEN), Inhale 2 L/min Every Night., Disp: , Rfl:   •  omeprazole (priLOSEC) 40 MG capsule, Take 1 capsule by mouth Daily. (Patient taking differently: Take 40 mg by mouth As Needed.), Disp: 30 capsule, Rfl: 11  •  ProAir  (90 Base) MCG/ACT inhaler, INHALE TWO PUFFS EVERY 4 HOURS AS NEEDED FOR wheezing, Disp: 8.5 g, Rfl: 2  •  tiotropium (Spiriva HandiHaler) 18 MCG per inhalation capsule, Place 1 capsule into inhaler and inhale Daily., Disp: 90 capsule, Rfl: 3  •  doxycycline (VIBRAMYICN) 100 MG tablet, Take 1 tablet by mouth 2 (Two) Times a Day., Disp: 20 tablet, Rfl: 0  •  predniSONE (DELTASONE) 10 MG tablet, Take 4 tabs daily x 3 days, then take 3 tabs daily x 3 days, then take 2 tabs daily x 3 days, then take 1 tab daily x 3 days, Disp: 31 tablet, Rfl: 0  MEDICATION LIST AND ALLERGIES REVIEWED.    Social History     Tobacco Use   • Smoking status: Former Smoker     Packs/day: 2.00     Years: 30.00     Pack years: 60.00     Types: Cigarettes     Quit date:      Years since quittin.7   • Smokeless tobacco: Never Used   • Tobacco comment: betina  "smoker 2 ppd x 30 years-Still smoke 1 cig, sporadically   Vaping Use   • Vaping Use: Never used   Substance Use Topics   • Alcohol use: Not Currently   • Drug use: Yes     Types: Marijuana     Comment: occasionally smoked marijuana       FAMILY AND SOCIAL HISTORY REVIEWED.    Review of Systems   Constitutional: Positive for fatigue. Negative for activity change, appetite change, fever and unexpected weight change.   HENT: Positive for congestion. Negative for postnasal drip, rhinorrhea, sinus pressure, sore throat and voice change.    Eyes: Negative for visual disturbance.   Respiratory: Positive for cough, chest tightness and shortness of breath. Negative for wheezing.    Cardiovascular: Negative for chest pain, palpitations and leg swelling.   Gastrointestinal: Negative for abdominal distention, abdominal pain, nausea and vomiting.   Endocrine: Negative for cold intolerance and heat intolerance.   Genitourinary: Negative for difficulty urinating and urgency.   Musculoskeletal: Negative for arthralgias, back pain and neck pain.   Skin: Negative for color change and pallor.   Allergic/Immunologic: Negative for environmental allergies and food allergies.   Neurological: Negative for dizziness, syncope, weakness and light-headedness.   Hematological: Negative for adenopathy. Does not bruise/bleed easily.   Psychiatric/Behavioral: Negative for agitation and behavioral problems.   .    /86 (BP Location: Right arm, Patient Position: Sitting, Cuff Size: Adult)   Pulse 62   Temp 96.9 °F (36.1 °C)   Resp 16   Ht 170.2 cm (67\")   Wt 65.3 kg (144 lb)   LMP  (LMP Unknown) Comment: last mammogram 2020  SpO2 96% Comment: room air at rest  BMI 22.55 kg/m²     Immunization History   Administered Date(s) Administered   • COVID-19 (PFIZER) 07/18/2021   • Flu Vaccine Quad PF >36MO 10/04/2016, 01/30/2019   • FluLaval/Fluarix >6 Months 10/22/2020   • Influenza TIV (IM) 10/20/2008   • Influenza, Unspecified 10/04/2016   • " Pneumococcal Polysaccharide (PPSV23) 10/22/2020   • Tdap 06/29/2010, 06/29/2010       Physical Exam  Vitals and nursing note reviewed.   Constitutional:       Appearance: She is well-developed. She is not diaphoretic.   HENT:      Head: Normocephalic and atraumatic.   Eyes:      Pupils: Pupils are equal, round, and reactive to light.   Neck:      Thyroid: No thyromegaly.   Cardiovascular:      Rate and Rhythm: Normal rate and regular rhythm.      Heart sounds: Normal heart sounds. No murmur heard.   No friction rub. No gallop.    Pulmonary:      Effort: Pulmonary effort is normal. No respiratory distress.      Breath sounds: Normal breath sounds. No wheezing or rales.   Chest:      Chest wall: No tenderness.   Abdominal:      General: Bowel sounds are normal.      Palpations: Abdomen is soft.      Tenderness: There is no abdominal tenderness.   Musculoskeletal:         General: No swelling. Normal range of motion.      Cervical back: Normal range of motion and neck supple.   Lymphadenopathy:      Cervical: No cervical adenopathy.   Skin:     General: Skin is warm and dry.      Capillary Refill: Capillary refill takes less than 2 seconds.   Neurological:      Mental Status: She is alert and oriented to person, place, and time.   Psychiatric:         Mood and Affect: Mood normal.         Behavior: Behavior normal.           RESULTS    XR Chest PA & Lateral    Result Date: 10/4/2021  No acute cardiopulmonary disease.  D: 10/04/2021 E:  10/04/2021       PROBLEM LIST    Problem List Items Addressed This Visit        Cardiac and Vasculature    Presence of Watchman left atrial appendage closure device       Gastrointestinal Abdominal     GERD        Pulmonary and Pneumonias    COPD with acute exacerbation    Relevant Medications    doxycycline (VIBRAMYICN) 100 MG tablet    predniSONE (DELTASONE) 10 MG tablet    ipratropium-albuterol (DUO-NEB) 0.5-2.5 mg/3 ml nebulizer    Fluticasone Furoate-Vilanterol (Breo Ellipta) 200-25  MCG/INH inhaler    tiotropium (Spiriva HandiHaler) 18 MCG per inhalation capsule    Pulmonary emphysema (HCC) - Primary    Overview     Description: A.  Moderate to severe obstruction, former tobacco abuse.  B.  Controlled with the azithromycin every Monday Wednesday Friday.         Relevant Medications    predniSONE (DELTASONE) 10 MG tablet    ipratropium-albuterol (DUO-NEB) 0.5-2.5 mg/3 ml nebulizer    Fluticasone Furoate-Vilanterol (Breo Ellipta) 200-25 MCG/INH inhaler    tiotropium (Spiriva HandiHaler) 18 MCG per inhalation capsule    Other Relevant Orders    XR Chest PA & Lateral (Completed)    A/C Respiratory Failure Type 2       Sleep    Nocturnal hypoxia on 2L NC        Tobacco    Tobacco abuse    Overview     Has smoked cigarettes within prior year (V15.82)   · A.  The patient is a former smoker of 2 packs of cigarettes a day for 30 years prior to      quitting in 2004.   Pt will smoke 1 cigarette sporadically 07/06/15           Other Visit Diagnoses     Personal history of smoking        Relevant Orders    CT chest low dose wo            DISCUSSION    Ms. Huff was here for follow-up of her COPD.  She seems to be having another COPD exacerbation and I will send in antibiotics and a prednisone taper for her to complete.  I did advise her that if she continues to feel poorly after this round of antibiotics that we will obtain a CT scan of the chest for further evaluation.    We did review her chest x-ray in the office today and there appears to be no acute pulmonary process.    She will continue Breo and Spiriva daily.  I did send in refills for her today.  I did encourage her to use the DuoNeb's as needed for shortness of breath.    She will continue to wear 2 to 3 L continuously at home.  I will order her a POC so she can use this outside of the home.    She does admit to smoking marijuana but states that she has cut back.  We did discuss smoking cessation in the office for 4 minutes.  I did encourage  her to completely quit smoking.  Based on her smoking history of 60 packs she does qualify for yearly CT screenings.  Her next CT scan will be due in February 2022.    She will follow-up in 3 months with full PFTs or sooner if her symptoms worsen.  I did advise her to call with any additional concerns or questions.    Level of service justified based on 38 minutes spent in patient care on this date of service including, but not limited to: preparing to see the patient, obtaining and/or reviewing history, performing medically appropriate examination, ordering tests/medicine/procedures, independently interpreting results, documenting clinical information in EHR, and counseling/education of patient/family/caregiver. (Level 4 30-39 minutes; Level 5 40-54 minutes)      Maryellen Higuera, APRN  10/04/097301:50 EDT  Electronically signed     Please note that portions of this note were completed with a voice recognition program. Efforts were made to edit the dictations, but occasionally words are mistranscribed.      CC: Alfonso Denney MD

## 2021-10-17 DIAGNOSIS — M25.512 ACUTE PAIN OF LEFT SHOULDER: ICD-10-CM

## 2021-10-18 RX ORDER — MELOXICAM 15 MG/1
15 TABLET ORAL DAILY
Qty: 30 TABLET | Refills: 0 | Status: SHIPPED | OUTPATIENT
Start: 2021-10-18 | End: 2021-11-30

## 2021-10-26 ENCOUNTER — TELEPHONE (OUTPATIENT)
Dept: CARDIOLOGY | Facility: CLINIC | Age: 61
End: 2021-10-26

## 2021-10-26 NOTE — TELEPHONE ENCOUNTER
Called to remind patient that her scheduled remote transmission for her cardiac device is due tomorrow. Pt verbalized understanding.

## 2021-11-07 PROCEDURE — 93294 REM INTERROG EVL PM/LDLS PM: CPT | Performed by: INTERNAL MEDICINE

## 2021-11-07 PROCEDURE — 93296 REM INTERROG EVL PM/IDS: CPT | Performed by: INTERNAL MEDICINE

## 2021-11-26 DIAGNOSIS — M25.512 ACUTE PAIN OF LEFT SHOULDER: ICD-10-CM

## 2021-11-30 RX ORDER — MELOXICAM 15 MG/1
15 TABLET ORAL DAILY
Qty: 30 TABLET | Refills: 0 | Status: SHIPPED | OUTPATIENT
Start: 2021-11-30 | End: 2022-02-28

## 2021-12-22 DIAGNOSIS — J44.1 COPD WITH ACUTE EXACERBATION (HCC): ICD-10-CM

## 2021-12-22 RX ORDER — PREDNISONE 10 MG/1
TABLET ORAL
Qty: 31 TABLET | Refills: 0 | Status: SHIPPED | OUTPATIENT
Start: 2021-12-22 | End: 2022-02-28

## 2021-12-22 RX ORDER — DOXYCYCLINE HYCLATE 100 MG
100 TABLET ORAL 2 TIMES DAILY
Qty: 20 TABLET | Refills: 0 | Status: SHIPPED | OUTPATIENT
Start: 2021-12-22 | End: 2022-02-28

## 2021-12-22 NOTE — PROGRESS NOTES
Ms. Huff called stating that she had been having worsening congestion and shortness of breath over the last week and was requesting antibiotics and steroids be called in.  I did advise her that if she were to worsen by next week she needs to be seen in the office.  She will call next week if she needs to be seen.

## 2022-01-13 ENCOUNTER — TELEPHONE (OUTPATIENT)
Dept: PULMONOLOGY | Facility: CLINIC | Age: 62
End: 2022-01-13

## 2022-01-13 NOTE — TELEPHONE ENCOUNTER
Pt called today requesting abx needing to be sent to be sent to her local pharmacy due to being exposed with COVID-19. Pt denies any symptoms such as sore throat/fever/fatigue. Pt has been sent abx/steroids on 12/22 and has been advised to schedule an apt if her cough/sob is no better. Pt was scheduled for 1/17 for COVID test and aprn on 1/19. Both of these apts will have to be cancelled at this time and pt has been advised to get tested for COVID-19 within the next day or two and will need to call the office back and reschedule her apt. Pt verbalized understanding.

## 2022-01-26 ENCOUNTER — TELEPHONE (OUTPATIENT)
Dept: CARDIOLOGY | Facility: CLINIC | Age: 62
End: 2022-01-26

## 2022-01-26 NOTE — TELEPHONE ENCOUNTER
Called patient to remind her today is her scheduled day to send in her pacemaker reading with her home monitor. She thanked me for the reminder and said she would send it in.

## 2022-02-06 PROCEDURE — 93294 REM INTERROG EVL PM/LDLS PM: CPT | Performed by: INTERNAL MEDICINE

## 2022-02-06 PROCEDURE — 93296 REM INTERROG EVL PM/IDS: CPT | Performed by: INTERNAL MEDICINE

## 2022-02-07 ENCOUNTER — HOSPITAL ENCOUNTER (OUTPATIENT)
Dept: CT IMAGING | Facility: HOSPITAL | Age: 62
Discharge: HOME | End: 2022-02-07
Admitting: NURSE PRACTITIONER

## 2022-02-07 DIAGNOSIS — Z87.891 PERSONAL HISTORY OF SMOKING: ICD-10-CM

## 2022-02-07 PROCEDURE — 71271 CT THORAX LUNG CANCER SCR C-: CPT

## 2022-02-25 RX ORDER — ASPIRIN 81 MG/1
81 TABLET ORAL DAILY
Qty: 30 TABLET | Refills: 11 | Status: SHIPPED | OUTPATIENT
Start: 2022-02-25 | End: 2022-02-28

## 2022-02-28 ENCOUNTER — OFFICE VISIT (OUTPATIENT)
Dept: CARDIOLOGY | Facility: CLINIC | Age: 62
End: 2022-02-28

## 2022-02-28 VITALS
DIASTOLIC BLOOD PRESSURE: 56 MMHG | BODY MASS INDEX: 23.57 KG/M2 | WEIGHT: 150.2 LBS | OXYGEN SATURATION: 96 % | HEART RATE: 81 BPM | SYSTOLIC BLOOD PRESSURE: 96 MMHG | HEIGHT: 67 IN

## 2022-02-28 DIAGNOSIS — Z72.0 TOBACCO ABUSE: ICD-10-CM

## 2022-02-28 DIAGNOSIS — I48.21 PERMANENT ATRIAL FIBRILLATION: Primary | ICD-10-CM

## 2022-02-28 DIAGNOSIS — J43.2 CENTRILOBULAR EMPHYSEMA: ICD-10-CM

## 2022-02-28 DIAGNOSIS — Z95.818 PRESENCE OF WATCHMAN LEFT ATRIAL APPENDAGE CLOSURE DEVICE: ICD-10-CM

## 2022-02-28 DIAGNOSIS — Z87.891 FORMER SMOKER: ICD-10-CM

## 2022-02-28 DIAGNOSIS — Z95.0 CARDIAC PACEMAKER IN SITU: ICD-10-CM

## 2022-02-28 DIAGNOSIS — I44.2 COMPLETE HEART BLOCK: ICD-10-CM

## 2022-02-28 PROBLEM — I48.91 ATRIAL FIBRILLATION WITH RAPID VENTRICULAR RESPONSE (HCC): Status: RESOLVED | Noted: 2020-09-21 | Resolved: 2022-02-28

## 2022-02-28 PROBLEM — I50.21 ACUTE SYSTOLIC CHF (CONGESTIVE HEART FAILURE): Status: RESOLVED | Noted: 2020-09-28 | Resolved: 2022-02-28

## 2022-02-28 PROBLEM — I48.0 PAROXYSMAL ATRIAL FIBRILLATION WITH RAPID VENTRICULAR RESPONSE (HCC): Status: RESOLVED | Noted: 2020-09-21 | Resolved: 2022-02-28

## 2022-02-28 PROCEDURE — 93279 PRGRMG DEV EVAL PM/LDLS PM: CPT | Performed by: INTERNAL MEDICINE

## 2022-02-28 PROCEDURE — 99214 OFFICE O/P EST MOD 30 MIN: CPT | Performed by: INTERNAL MEDICINE

## 2022-02-28 NOTE — PROGRESS NOTES
Cardiac Electrophysiology Outpatient Follow Up Note            [facility]    Follow Up Office Visit      Nolvia Huff  [id number]  02/28/2022  [unfilled]  [unfilled]    Primary Care Physician: Alfonso Denney MD    Referred By: No ref. provider found    Subjective     Chief Complaint:   Diagnoses and all orders for this visit:    1. Permanent atrial fibrillation (HCC) (Primary)    2. Cardiac pacemaker in situ    3. Complete heart block (HCC)    4. Presence of Watchman left atrial appendage closure device    5. Tobacco abuse    6. Former smoker    7. Centrilobular emphysema (HCC)      Chief Complaint   Patient presents with   • Permanent atrial fibrillation (CMS/HCC)       History of Present Illness:   Nolvia Huff is a 62 y.o. female who presents to my electrophysiology clinic for follow up of above complaints.  Nolvia is doing well.  No chest pain nausea vomit fevers or chills.  No longer smokes.    Feels great.  Takes care of her grandson.      Review of Systems:   Constitutional: No fevers or chills, no recent weight gain or weight loss or fatigue  Eyes: No visual loss, blurred vision, double vision, yellow sclerae.  ENT: No headaches, hearing loss, vertigo, congestion or sore throat.   Cardiovascular: Per HPI  Respiratory: No cough or wheezing, no sputum production, no hematemesis   Gastrointestinal: No abdominal pain, no nausea, vomiting, constipation, diarrhea, melena.   Genitourinary: No dysuria, hematuria or increased frequency.  Musculoskeletal:  No gait disturbance, weakness or joint pain or stiffness  Integumentary: No rashes, urticaria, ulcers or sores.   Neurological: No headache, dizziness, syncope, paralysis, ataxia, no prior CVA/TIA  Psychiatric: No anxiety, or depression  Endocrine: No diaphoresis, cold or heat intolerance. No polyuria or polydipsia.   Hematologic/Lymphatic: No anemia, abnormal bruising or bleeding. No history of  DVT/PE.      Past Medical History:   Past Medical History:   Diagnosis Date   • Acute otitis media     H/o   • Atrial fibrillation (HCC)    • COPD (chronic obstructive pulmonary disease) (HCC)     inhalers prn    • Dependence on nocturnal oxygen therapy     2-3 liters at night and daytime use during summer months    • GERD (gastroesophageal reflux disease)    • H/O chest x-ray 10/04/2011    Cardiac silhouute normal limits. The pulmonary vasculature appears normal, The diaphragms are flattened bilaterally which is sug of COPD. Lungs are grossly clear bilaterally with no prominent nodules or masses seen. No change when compared to previous exam from 11/10/09   • H/O chest x-ray 05/31/2011    Chronic change. No active disease   • H/O chest x-ray 11/10/2009    Moderate degenerative changes of thoracic spine. Cardiac silhoutte appears normal. There is slight hyperexpansion. There were a few scattered calcified granulomas. No acute appearing infiltrates noted. no real change compared to 10/20/08   • History of PFTs 07/06/2015    Severe OAD w/ dec in FEV from prior. no resp to BDRX. MVV reduced. FVC reduced; sugg restricitve pattern   • History of PFTs 10/21/2013    Mod severe OAD, FVC normal, no restriction, MVV reduced   • History of PFTs 06/19/2012    Severe OAD, FVC reduced, MVV dec   • Normal stillborn     Pt had stillborn infant   • Pacemaker        Past Surgical History:   Past Surgical History:   Procedure Laterality Date   • ATRIAL APPENDAGE EXCLUSION LEFT WITH TRANSESOPHAGEAL ECHOCARDIOGRAM N/A 2/9/2021    Procedure: Atrial Appendage Occlusion (Watchman), on Eliquis hold 2 doses;  Surgeon: Maximus Solomon DO;  Location:  [facility];  Service: Cardiology;  Laterality: N/A;   • BREAST CYST ASPIRATION Right    • CARDIAC ELECTROPHYSIOLOGY PROCEDURE N/A 9/30/2020    Procedure: MICRA PACEMAKER IMPLANTATION + AVN;  Surgeon: Maximus Solomon DO;  Location:  [facility];  Service: Cardiology;   Laterality: N/A;   • COLONOSCOPY     • HYSTERECTOMY     • TONSILLECTOMY         Family History:   Family History   Problem Relation Age of Onset   • Emphysema Mother    • Cancer Mother         laryngeal carcinoma   • Heart murmur Father    • Cerebral palsy Sister    • Breast cancer Sister    • No Known Problems Son    • Cancer Maternal Grandfather         laryngeal carcinoma.   • Asthma Brother    • Heart murmur Brother    • Heart disease Maternal Grandmother    • No Known Problems Paternal Grandmother    • No Known Problems Paternal Grandfather    • Other Son          at birth       Social History:   Social History     Socioeconomic History   • Marital status:    Tobacco Use   • Smoking status: Former Smoker     Packs/day: 2.00     Years: 30.00     Pack years: 60.00     Types: Cigarettes     Quit date:      Years since quittin.1   • Smokeless tobacco: Never Used   • Tobacco comment: fomer smoker 2 ppd x 30 years-Still smoke 1 cig, sporadically   Vaping Use   • Vaping Use: Never used   Substance and Sexual Activity   • Alcohol use: Not Currently   • Drug use: Yes     Types: Marijuana     Comment: occasionally smoked marijuana   • Sexual activity: Defer       Medications:     Current Outpatient Medications:   •  Fluticasone Furoate-Vilanterol (Breo Ellipta) 200-25 MCG/INH inhaler, Inhale 1 puff Daily., Disp: 60 each, Rfl: 6  •  guaiFENesin (MUCINEX) 600 MG 12 hr tablet, Take 1 tablet by mouth 2 (Two) Times a Day. (Patient taking differently: Take 600 mg by mouth Daily As Needed.), Disp: 60 tablet, Rfl: 6  •  ipratropium-albuterol (DUO-NEB) 0.5-2.5 mg/3 ml nebulizer, Take 3 mL by nebulization 4 (Four) Times a Day As Needed for Wheezing., Disp: 360 mL, Rfl: 3  •  montelukast (SINGULAIR) 10 MG tablet, Take 1 tablet by mouth Every Night. (Patient taking differently: Take 10 mg by mouth At Night As Needed.), Disp: 30 tablet, Rfl: 11  •  O2 (OXYGEN), Inhale 2 L/min Every Night., Disp: , Rfl:   •   "omeprazole (priLOSEC) 40 MG capsule, Take 1 capsule by mouth Daily. (Patient taking differently: Take 40 mg by mouth As Needed.), Disp: 30 capsule, Rfl: 11  •  ProAir  (90 Base) MCG/ACT inhaler, INHALE TWO PUFFS EVERY 4 HOURS AS NEEDED FOR wheezing, Disp: 8.5 g, Rfl: 2  •  tiotropium (Spiriva HandiHaler) 18 MCG per inhalation capsule, Place 1 capsule into inhaler and inhale Daily., Disp: 90 capsule, Rfl: 3    Allergies:   Allergies   Allergen Reactions   • Penicillins Anaphylaxis   • Tikosyn [Dofetilide] Other (See Comments)     Torsades       Objective   Vital Signs:   Vitals:    02/28/22 1029   BP: 96/56   BP Location: Right arm   Patient Position: Sitting   Pulse: 81   SpO2: 96%   Weight: 68.1 kg (150 lb 3.2 oz)   Height: 170.2 cm (67\")       PHYSICAL EXAM  General appearance: Awake, alert, cooperative  Head: Normocephalic, without obvious abnormality, atraumatic  Eyes: Conjunctivae/corneas clear, EOMs intact  Neck: no adenopathy, no carotid bruit, no JVD and thyroid: not enlarged  Lungs: clear to auscultation bilaterally and no rhonchi or crackles\", ' symmetric  Heart: regular rate and rhythm, S1, S2 normal, no murmur, click, rub or gallop  Abdomen: Soft, non-tender, bowel sounds normal,  no organomegaly  Extremities: extremities normal, atraumatic, no cyanosis or edema  Skin: Skin color, turgor normal, no rashes or lesions  Neurologic: Grossly normal     Lab Results   Component Value Date    GLUCOSE 116 (H) 02/05/2021    CALCIUM 9.5 02/05/2021     02/05/2021    K 4.5 02/05/2021    CO2 28.0 02/05/2021     02/05/2021    BUN 9 02/05/2021    CREATININE 0.68 02/05/2021    EGFRIFNONA 87 02/05/2021    BCR 13.2 02/05/2021    ANIONGAP 8.0 02/05/2021     Lab Results   Component Value Date    WBC 7.28 02/05/2021    HGB 15.4 02/05/2021    HCT 47.1 (H) 02/05/2021    MCV 89.4 02/05/2021     02/05/2021     Lab Results   Component Value Date    INR 1.14 09/21/2020    PROTIME 14.3 (H) 09/21/2020 "     Lab Results   Component Value Date    TSH 5.430 (H) 09/21/2020       Cardiac Testing:     I personally viewed and interpreted the patient's EKG/Telemetry/lab data    Procedures    Tobacco Cessation: N/A  Obstructive Sleep Apnea Screening: Completed    Assessment & Plan    Diagnoses and all orders for this visit:    1. Permanent atrial fibrillation (HCC) (Primary)    2. Cardiac pacemaker in situ    3. Complete heart block (HCC)    4. Presence of Watchman left atrial appendage closure device    5. Tobacco abuse    6. Former smoker    7. Centrilobular emphysema (HCC)         Diagnosis Plan   1. Permanent atrial fibrillation (HCC)   asymptomatic.  Definitive rate control.   2. Cardiac pacemaker in situ   Medtronic leadless cardiac pacemaker.  Micra AV.  Appropriate pacing sensing impedance values.    This patient's Cardiac Implanted Electronic Device was manually interrogated and reprogrammed during the patient encounter today.  Iterative programming changes were manually made to determine the sensing threshold, pacing threshold, lead impedance as well as underlying cardiac rhythm.  These programming changes were not limited to but included some or all of the following when appropriate: pacing mode, programmed AV delays, blanking periods, and refractory periods.  Data obtained as a result of these manual programing changes informed the patient's CIED permanent programming.   3. Complete heart block (HCC)   AV node ablation.  Pacemaker dependent.   4. Presence of Watchman left atrial appendage closure device   27 mm watchman FL X device implanted.  Endothelialization completed.   5. Tobacco abuse   no longer smokes.   6. Former smoker   no longer smokes.   7. Centrilobular emphysema (HCC)   no longer smokes.     Body mass index is 23.52 kg/m².    I spent 39 minutes in consultation with this patient which included more than 65% of this time in direct face-to-face counseling, physical examination and discussion of my  assessment and findings and shared decision making with the patient.  The remainder of the time not spent face to face was performing one, some or all of the following actions:  preparing to see this patient ( eg. Review of tests),  ordering medications, tests or procedures ), care coordination, discussion of the plan with other healthcare providers, documenting clinical information in Epic well as independently interpreting results and communicating results to patient, family and or caregiver.  All time noted occurred on the date of service.    Follow Up:       Thank you for allowing me to participate in the care of your patient. Please to not hesitate to contact me with additional questions or concerns.      Maximus Solomon, DO, FACC, RS  Cardiac Electrophysiologist  [facility] / [facility]

## 2022-03-15 ENCOUNTER — OFFICE VISIT (OUTPATIENT)
Dept: CARDIOLOGY | Facility: HOSPITAL | Age: 62
End: 2022-03-15

## 2022-03-15 VITALS
SYSTOLIC BLOOD PRESSURE: 123 MMHG | WEIGHT: 149.06 LBS | HEART RATE: 66 BPM | DIASTOLIC BLOOD PRESSURE: 70 MMHG | TEMPERATURE: 95.9 F | BODY MASS INDEX: 23.39 KG/M2 | OXYGEN SATURATION: 98 % | HEIGHT: 67 IN | RESPIRATION RATE: 13 BRPM

## 2022-03-15 DIAGNOSIS — Z95.0 CARDIAC PACEMAKER IN SITU: ICD-10-CM

## 2022-03-15 DIAGNOSIS — E78.5 DYSLIPIDEMIA: ICD-10-CM

## 2022-03-15 DIAGNOSIS — I48.21 PERMANENT ATRIAL FIBRILLATION: Primary | ICD-10-CM

## 2022-03-15 PROCEDURE — 99214 OFFICE O/P EST MOD 30 MIN: CPT | Performed by: NURSE PRACTITIONER

## 2022-03-15 NOTE — PROGRESS NOTES
"Chief Complaint  Congestive Heart Failure and Follow-up    Subjective    History of Present Illness {CC  Problem List  Visit  Diagnosis   Encounters  Notes  Medications  Labs  Result Review Imaging  Media :23}       History of Present Illness   62-year-old female presents the office today for ongoing evaluation of her Atrial fibrillation and complete heart block.  She is followed by Dr. Jon Bettencourt.  Permanent pacemaker in place.  She is currently raising her [id number] grandson.  Notes she is feeling better than she has in quite some time.  Currently denies chest pain, palpitations, dyspnea, presyncope, syncope, orthopnea, PND or pedal edema.  Patient also has watchman implanted and COPD.  Objective     Vital Signs:   Vitals:    03/15/22 1010   BP: 123/70   BP Location: Left arm   Patient Position: Sitting   Cuff Size: Adult   Pulse: 66   Resp: 13   Temp: 95.9 °F (35.5 °C)   TempSrc: Temporal   SpO2: 98%   Weight: 67.6 kg (149 lb 1 oz)   Height: 170.2 cm (67\")     Body mass index is 23.35 kg/m².  Physical Exam  Vitals and nursing note reviewed.   Constitutional:       Appearance: Normal appearance.   HENT:      Head: Normocephalic.   Eyes:      Pupils: Pupils are equal, round, and reactive to light.   Cardiovascular:      Rate and Rhythm: Normal rate and regular rhythm.      Pulses: Normal pulses.      Heart sounds: Normal heart sounds. No murmur heard.  Pulmonary:      Effort: Pulmonary effort is normal.      Breath sounds: Normal breath sounds.   Abdominal:      General: Bowel sounds are normal.      Palpations: Abdomen is soft.   Musculoskeletal:         General: Normal range of motion.      Cervical back: Normal range of motion.      Right lower leg: No edema.      Left lower leg: No edema.   Skin:     General: Skin is warm and dry.      Capillary Refill: Capillary refill takes less than 2 seconds.   Neurological:      Mental Status: She is alert and oriented to person, place, and time.   Psychiatric:    "      Mood and Affect: Mood normal.         Thought Content: Thought content normal.              Result Review  Data Reviewed:{ Labs  Result Review  Imaging  Med Tab  Media :23}   Echo 3/21:   ·   Left ventricular ejection fraction appears to be 51 - 55%. Left ventricular systolic function is normal.  · The left atrial cavity is dilated.  · The left atrial appendage is status post closure with a well-seated 27 mm Watchman FLX device. There was no peridevice leak noted.  · The right atrial cavity is dilated.  · Mild mitral valve regurgitation is present.  · Mild tricuspid valve regurgitation is present.  · There is mild plaque in the descending aorta present.         Assessment and Plan {CC Problem List  Visit Diagnosis  ROS  Review (Popup)  Health Maintenance  Quality  BestPractice  Medications  SmartSets  SnapShot Encounters  Media :23}   1. Permanent atrial fibrillation (HCC)  CHADS-VASc Risk Assessment            2 Total Score    1 Age 65-74    1 Sex: Female        Criteria that do not apply:    CHF    Hypertension    Age >/= 75    DM    PRIOR STROKE/TIA/THROMBO    Vascular Disease        Watchman in place     2. Cardiac pacemaker in situ  Follows with Dr Solomon     3. Dyslipidemia  Diet controlled     Patient has requested a follow up in [facility] in 3-4 months   Follow Up {Instructions Charge Capture  Follow-up Communications :23}   Return in about 4 months (around 7/15/2022) for Office visit, AFIB, HTN.    Patient was given instructions and counseling regarding her condition or for health maintenance advice. Please see specific information pulled into the AVS if appropriate.  Patient was instructed to call the [facility] with any questions, concerns, or worsening symptoms.

## 2022-03-25 DIAGNOSIS — Z01.812 BLOOD TESTS PRIOR TO TREATMENT OR PROCEDURE: Primary | ICD-10-CM

## 2022-03-28 ENCOUNTER — CLINICAL SUPPORT NO REQUIREMENTS (OUTPATIENT)
Dept: PULMONOLOGY | Facility: CLINIC | Age: 62
End: 2022-03-28

## 2022-03-28 DIAGNOSIS — Z01.812 BLOOD TESTS PRIOR TO TREATMENT OR PROCEDURE: ICD-10-CM

## 2022-03-28 PROCEDURE — U0004 COV-19 TEST NON-CDC HGH THRU: HCPCS | Performed by: NURSE PRACTITIONER

## 2022-03-28 PROCEDURE — 99211 OFF/OP EST MAY X REQ PHY/QHP: CPT | Performed by: NURSE PRACTITIONER

## 2022-03-29 LAB — SARS-COV-2 RNA NOSE QL NAA+PROBE: NOT DETECTED

## 2022-03-30 ENCOUNTER — OFFICE VISIT (OUTPATIENT)
Dept: PULMONOLOGY | Facility: CLINIC | Age: 62
End: 2022-03-30

## 2022-03-30 VITALS
DIASTOLIC BLOOD PRESSURE: 76 MMHG | TEMPERATURE: 97.6 F | BODY MASS INDEX: 23.07 KG/M2 | SYSTOLIC BLOOD PRESSURE: 124 MMHG | WEIGHT: 147 LBS | OXYGEN SATURATION: 96 % | HEIGHT: 67 IN | HEART RATE: 76 BPM

## 2022-03-30 DIAGNOSIS — G47.34 NOCTURNAL HYPOXIA: ICD-10-CM

## 2022-03-30 DIAGNOSIS — J43.2 CENTRILOBULAR EMPHYSEMA: Primary | ICD-10-CM

## 2022-03-30 DIAGNOSIS — Z87.891 PERSONAL HISTORY OF TOBACCO USE, PRESENTING HAZARDS TO HEALTH: ICD-10-CM

## 2022-03-30 DIAGNOSIS — K21.9 GASTROESOPHAGEAL REFLUX DISEASE WITHOUT ESOPHAGITIS: ICD-10-CM

## 2022-03-30 PROCEDURE — 94729 DIFFUSING CAPACITY: CPT | Performed by: NURSE PRACTITIONER

## 2022-03-30 PROCEDURE — 94726 PLETHYSMOGRAPHY LUNG VOLUMES: CPT | Performed by: NURSE PRACTITIONER

## 2022-03-30 PROCEDURE — 94375 RESPIRATORY FLOW VOLUME LOOP: CPT | Performed by: NURSE PRACTITIONER

## 2022-03-30 PROCEDURE — 99214 OFFICE O/P EST MOD 30 MIN: CPT | Performed by: NURSE PRACTITIONER

## 2022-03-30 RX ORDER — ALBUTEROL SULFATE 90 UG/1
2 AEROSOL, METERED RESPIRATORY (INHALATION) EVERY 4 HOURS PRN
Qty: 18 G | Refills: 5 | Status: SHIPPED | OUTPATIENT
Start: 2022-03-30 | End: 2022-12-12

## 2022-03-30 RX ORDER — PREDNISONE 10 MG/1
TABLET ORAL
Qty: 30 TABLET | Refills: 1 | Status: SHIPPED | OUTPATIENT
Start: 2022-03-30 | End: 2022-05-03 | Stop reason: SDUPTHER

## 2022-03-30 NOTE — PROGRESS NOTES
[facility] Pulmonary Follow up    CHIEF COMPLAINT    dyspnea    HISTORY OF PRESENT ILLNESS    Nolvia Huff is a 62 y.o.female here today for follow-up.  She was last seen in the office by me in October.  She denies any respiratory illnesses since her last appointment.    She has noticed more congestion over the last couple of weeks.  She has had more shortness of breath as well.  She states that the weather change has affected her breathing.    She continues to use her Breo and Spiriva daily.  She also has DuoNebs to use occasionally for shortness of breath.  She continues to stay active taking care of her grandbaby.    She continues to wear 2 L nasal cannula at nighttime.  She denies any sleeping difficulties.    She denies fever, chills, sputum production, hemoptysis, night sweats, weight loss, chest pain or palpitations.She denies any lower extremity edema or calf tenderness.  She will occasionally have some sinus or allergy symptoms.  She takes over-the-counter medication as needed.  She denies reflux symptoms.  She does take omeprazole daily.     She is up-to-date on her current vaccinations.    She quit smoking in 2004 and has a 60-pack-year history.  Her last CT scan was in February with results below.    Patient Active Problem List   Diagnosis   • Anxiety disorder   • Dyslipidemia   • Emphysema/COPD (HCC)   • GERD    • Osteoporosis   • Personal history of tobacco use, presenting hazards to health   • COPD with acute exacerbation   • Nocturnal hypoxia on 2L NC    • Pulmonary emphysema (HCC)   • A/C Respiratory Failure Type 2   • Former smoker   • Complete heart block (HCC)   • Cardiac pacemaker in situ   • Permanent atrial fibrillation (HCC)   • Presence of Watchman left atrial appendage closure device       Allergies   Allergen Reactions   • Penicillins Anaphylaxis   • Tikosyn [Dofetilide] Other (See Comments)     Torsades       Current Outpatient Medications:   •  albuterol sulfate HFA (ProAir HFA) 108  (90 Base) MCG/ACT inhaler, Inhale 2 puffs Every 4 (Four) Hours As Needed for Wheezing., Disp: 18 g, Rfl: 5  •  Fluticasone Furoate-Vilanterol (Breo Ellipta) 200-25 MCG/INH inhaler, Inhale 1 puff Daily., Disp: 60 each, Rfl: 6  •  guaiFENesin (MUCINEX) 600 MG 12 hr tablet, Take 1 tablet by mouth 2 (Two) Times a Day. (Patient taking differently: Take 600 mg by mouth Daily As Needed.), Disp: 60 tablet, Rfl: 6  •  ipratropium-albuterol (DUO-NEB) 0.5-2.5 mg/3 ml nebulizer, Take 3 mL by nebulization 4 (Four) Times a Day As Needed for Wheezing., Disp: 360 mL, Rfl: 3  •  montelukast (SINGULAIR) 10 MG tablet, Take 1 tablet by mouth Every Night. (Patient taking differently: Take 10 mg by mouth At Night As Needed.), Disp: 30 tablet, Rfl: 11  •  O2 (OXYGEN), Inhale 2 L/min Every Night., Disp: , Rfl:   •  omeprazole (priLOSEC) 40 MG capsule, Take 1 capsule by mouth Daily. (Patient taking differently: Take 40 mg by mouth As Needed.), Disp: 30 capsule, Rfl: 11  •  tiotropium (Spiriva HandiHaler) 18 MCG per inhalation capsule, Place 1 capsule into inhaler and inhale Daily., Disp: 90 capsule, Rfl: 3  •  predniSONE (DELTASONE) 10 MG tablet, Take 4 tabs daily x 3 days, then take 3 tabs daily x 3 days, then take 2 tabs daily x 3 days, then take 1 tab daily x 3 days, Disp: 30 tablet, Rfl: 1  MEDICATION LIST AND ALLERGIES REVIEWED.    Social History     Tobacco Use   • Smoking status: Former Smoker     Packs/day: 2.00     Years: 30.00     Pack years: 60.00     Types: Cigarettes     Quit date:      Years since quittin.2   • Smokeless tobacco: Never Used   • Tobacco comment: fomer smoker 2 ppd x 30 years-Still smoke 1 cig, sporadically   Vaping Use   • Vaping Use: Never used   Substance Use Topics   • Alcohol use: Not Currently   • Drug use: Yes     Types: Marijuana     Comment: occasionally smoked marijuana       FAMILY AND SOCIAL HISTORY REVIEWED.    Review of Systems   Constitutional: Positive for fatigue. Negative for activity  "change, appetite change, fever and unexpected weight change.   HENT: Positive for congestion. Negative for postnasal drip, rhinorrhea, sinus pressure, sore throat and voice change.    Eyes: Negative for visual disturbance.   Respiratory: Positive for cough and shortness of breath. Negative for chest tightness and wheezing.    Cardiovascular: Negative for chest pain, palpitations and leg swelling.   Gastrointestinal: Negative for abdominal distention, abdominal pain, nausea and vomiting.   Endocrine: Negative for cold intolerance and heat intolerance.   Genitourinary: Negative for difficulty urinating and urgency.   Musculoskeletal: Negative for arthralgias, back pain and neck pain.   Skin: Negative for color change and pallor.   Allergic/Immunologic: Negative for environmental allergies and food allergies.   Neurological: Negative for dizziness, syncope, weakness and light-headedness.   Hematological: Negative for adenopathy. Does not bruise/bleed easily.   Psychiatric/Behavioral: Negative for agitation and behavioral problems.   .    /76   Pulse 76   Temp 97.6 °F (36.4 °C)   Ht 170.2 cm (67\")   Wt 66.7 kg (147 lb)   LMP  (LMP Unknown) Comment: last mammogram 2020  SpO2 96% Comment: resting room air  Breastfeeding No   BMI 23.02 kg/m²     Immunization History   Administered Date(s) Administered   • COVID-19 (PFIZER) PURPLE CAP 07/18/2021, 08/10/2021   • Flu Vaccine Quad PF >36MO 10/04/2016, 01/30/2019   • FluLaval/Fluarix/Fluzone >6 10/22/2020   • Influenza TIV (IM) 10/20/2008   • Influenza, Unspecified 10/04/2016   • Pneumococcal Polysaccharide (PPSV23) 10/22/2020   • Tdap 06/29/2010, 06/29/2010       Physical Exam  Vitals and nursing note reviewed.   Constitutional:       Appearance: She is well-developed. She is not diaphoretic.   HENT:      Head: Normocephalic and atraumatic.   Eyes:      Pupils: Pupils are equal, round, and reactive to light.   Neck:      Thyroid: No thyromegaly.   Cardiovascular: "      Rate and Rhythm: Normal rate and regular rhythm.      Heart sounds: Normal heart sounds. No murmur heard.    No friction rub. No gallop.   Pulmonary:      Effort: Pulmonary effort is normal. No respiratory distress.      Breath sounds: Normal breath sounds. No wheezing or rales.   Chest:      Chest wall: No tenderness.   Abdominal:      General: Bowel sounds are normal.      Palpations: Abdomen is soft.      Tenderness: There is no abdominal tenderness.   Musculoskeletal:         General: No swelling. Normal range of motion.      Cervical back: Normal range of motion and neck supple.   Lymphadenopathy:      Cervical: No cervical adenopathy.   Skin:     General: Skin is warm and dry.      Capillary Refill: Capillary refill takes less than 2 seconds.   Neurological:      Mental Status: She is alert and oriented to person, place, and time.   Psychiatric:         Behavior: Behavior normal.           RESULTS    PFTS in the office today, read by me, FVC 2.21 64% predicted, FEV1 1.08 41% predicted, FEV1/FVC 49% predicted, TLC 3.03 58% predicted, DLCO 36% predicted, severe obstruction, moderate to severe restriction and severe diffusion.    CT Chest Low Dose Cancer Screening WO    Result Date: 2/7/2022  Lung RADS category 1, no suspicious pulmonary nodules, continue annual screening. No acute findings are present. Previously noted findings of pulmonary edema are improved. There is redemonstrated emphysema and mild bronchiectasis.   This report was finalized on 2/7/2022 11:10 AM by Derrick Rawls.      PROBLEM LIST    Problem List Items Addressed This Visit        Gastrointestinal Abdominal     GERD        Pulmonary and Pneumonias    Pulmonary emphysema (HCC) - Primary    Overview     Description: A.  Moderate to severe obstruction, former tobacco abuse.  B.  Controlled with the azithromycin every Monday Wednesday Friday.           Relevant Medications    Fluticasone Furoate-Vilanterol (Breo Ellipta) 200-25 MCG/INH  inhaler    tiotropium (Spiriva HandiHaler) 18 MCG per inhalation capsule    albuterol sulfate HFA (ProAir HFA) 108 (90 Base) MCG/ACT inhaler    predniSONE (DELTASONE) 10 MG tablet    Other Relevant Orders    Pulmonary Function Test (Completed)       Sleep    Nocturnal hypoxia on 2L NC        Tobacco    Personal history of tobacco use, presenting hazards to health            DISCUSSION    Ms. Huff was here for follow-up of her COPD.  We did review her full PFTs in the office today and she continues to have a severe obstruction.  She has a lower TLC than her previous testing.  She states that she is having some difficult times breathing over the last couple of weeks.    I am going to treat her with a steroid taper for COPD exacerbation.  She does not need any antibiotics at this time.    She will continue Breo and Spiriva daily for COPD.  She will continue to use her DuoNeb's and albuterol as needed for shortness of breath.    She will continue omeprazole daily for GERD.  We also discussed reflux precautions in the office today.    She will continue to wear 2 L continuously at nighttime for nocturnal hypoxia.    We did review her low-dose CT screening that was performed in February that showed a stable right lower lobe nodule and no new nodule or mass concerning for malignancy.  She does not require any further low-dose screenings due to smoking greater than 15 years ago.    She will follow-up in 6 months or sooner if her symptoms worsen.  I did advise her to call with any additional concerns or questions.    Level of service justified based on 36 minutes spent in patient care on this date of service including, but not limited to: preparing to see the patient, obtaining and/or reviewing history, performing medically appropriate examination, ordering tests/medicine/procedures, independently interpreting results, documenting clinical information in EHR, and counseling/education of patient/family/caregiver. (Level 4  30-39 minutes; Level 5 40-54 minutes)      HERNAN Rizzo  03/30/202211:52 EDT  Electronically signed     Please note that portions of this note were completed with a voice recognition program.        CC: Alfonso Denney MD

## 2022-04-26 ENCOUNTER — TELEPHONE (OUTPATIENT)
Dept: PULMONOLOGY | Facility: CLINIC | Age: 62
End: 2022-04-26

## 2022-05-03 ENCOUNTER — OFFICE VISIT (OUTPATIENT)
Dept: PULMONOLOGY | Facility: CLINIC | Age: 62
End: 2022-05-03

## 2022-05-03 VITALS
HEART RATE: 86 BPM | OXYGEN SATURATION: 92 % | DIASTOLIC BLOOD PRESSURE: 68 MMHG | SYSTOLIC BLOOD PRESSURE: 118 MMHG | TEMPERATURE: 98.2 F | HEIGHT: 67 IN | BODY MASS INDEX: 23.23 KG/M2 | WEIGHT: 148 LBS

## 2022-05-03 DIAGNOSIS — G47.34 NOCTURNAL HYPOXIA: ICD-10-CM

## 2022-05-03 DIAGNOSIS — J44.1 COPD WITH ACUTE EXACERBATION: ICD-10-CM

## 2022-05-03 DIAGNOSIS — F41.9 ANXIETY DISORDER, UNSPECIFIED TYPE: ICD-10-CM

## 2022-05-03 DIAGNOSIS — J43.2 CENTRILOBULAR EMPHYSEMA: Primary | ICD-10-CM

## 2022-05-03 PROCEDURE — 94618 PULMONARY STRESS TESTING: CPT | Performed by: NURSE PRACTITIONER

## 2022-05-03 PROCEDURE — 99214 OFFICE O/P EST MOD 30 MIN: CPT | Performed by: NURSE PRACTITIONER

## 2022-05-03 RX ORDER — PREDNISONE 10 MG/1
TABLET ORAL
Qty: 30 TABLET | Refills: 1 | Status: SHIPPED | OUTPATIENT
Start: 2022-05-03 | End: 2022-09-27 | Stop reason: SDUPTHER

## 2022-05-03 RX ORDER — PAROXETINE 10 MG/1
10 TABLET, FILM COATED ORAL EVERY MORNING
Qty: 90 TABLET | Refills: 3 | Status: SHIPPED | OUTPATIENT
Start: 2022-05-03

## 2022-05-03 RX ORDER — DOXYCYCLINE HYCLATE 100 MG
100 TABLET ORAL 2 TIMES DAILY
Qty: 20 TABLET | Refills: 0 | Status: SHIPPED | OUTPATIENT
Start: 2022-05-03 | End: 2022-09-18

## 2022-05-03 NOTE — PROGRESS NOTES
[facility] Follow up    CHIEF COMPLAINT    dyspnea    HISTORY OF PRESENT ILLNESS    Nolvia Huff is a 62 y.o.female here today for follow-up.  She was last seen at the end of March by me.  She states that she did have to have her oxygen recertified and is here today for that.    She has noticed more congestion over the last couple weeks.  She states she has been dealing with a lot of stress due to multiple deaths in the family and her mother is currently ill as well.    She continues to use her Breo and Spiriva daily.  She also uses her nebulizers as needed during the day.  She continues to be short of breath with any exertion.  She does take frequent breaks to recover.    She has been using her oxygen during the day and at night at 2 L.  She does feel better when she wears her oxygen with exertion.    She denies fever, chills, hemoptysis, chest pain or palpitations.  She has noticed more congestion and thick tan to green sputum.  She does feel like she needs an antibiotic currently.    She continues to take her Singulair nightly for her allergies.  She also uses omeprazole regularly for GERD.    She is up-to-date on her current vaccinations.    Patient Active Problem List   Diagnosis   • Anxiety disorder   • Dyslipidemia   • Emphysema/COPD (HCC)   • GERD    • Osteoporosis   • Personal history of tobacco use, presenting hazards to health   • COPD with acute exacerbation   • Nocturnal hypoxia on 2L NC    • Pulmonary emphysema (HCC)   • A/C Respiratory Failure Type 2   • Former smoker   • Complete heart block (HCC)   • Cardiac pacemaker in situ   • Permanent atrial fibrillation (HCC)   • Presence of Watchman left atrial appendage closure device       Allergies   Allergen Reactions   • Penicillins Anaphylaxis   • Tikosyn [Dofetilide] Other (See Comments)     Torsades       Current Outpatient Medications:   •  albuterol sulfate HFA (ProAir HFA) 108 (90 Base) MCG/ACT inhaler, Inhale 2 puffs Every 4 (Four)  Hours As Needed for Wheezing., Disp: 18 g, Rfl: 5  •  Fluticasone Furoate-Vilanterol (Breo Ellipta) 200-25 MCG/INH inhaler, Inhale 1 puff Daily., Disp: 60 each, Rfl: 6  •  guaiFENesin (MUCINEX) 600 MG 12 hr tablet, Take 1 tablet by mouth 2 (Two) Times a Day. (Patient taking differently: Take 600 mg by mouth Daily As Needed.), Disp: 60 tablet, Rfl: 6  •  ipratropium-albuterol (DUO-NEB) 0.5-2.5 mg/3 ml nebulizer, Take 3 mL by nebulization 4 (Four) Times a Day As Needed for Wheezing., Disp: 360 mL, Rfl: 3  •  montelukast (SINGULAIR) 10 MG tablet, Take 1 tablet by mouth Every Night. (Patient taking differently: Take 10 mg by mouth At Night As Needed.), Disp: 30 tablet, Rfl: 11  •  O2 (OXYGEN), Inhale 2 L/min Every Night., Disp: , Rfl:   •  omeprazole (priLOSEC) 40 MG capsule, Take 1 capsule by mouth Daily. (Patient taking differently: Take 40 mg by mouth As Needed.), Disp: 30 capsule, Rfl: 11  •  predniSONE (DELTASONE) 10 MG tablet, Take 4 tabs daily x 3 days, then take 3 tabs daily x 3 days, then take 2 tabs daily x 3 days, then take 1 tab daily x 3 days, Disp: 30 tablet, Rfl: 1  •  tiotropium (Spiriva HandiHaler) 18 MCG per inhalation capsule, Place 1 capsule into inhaler and inhale Daily., Disp: 90 capsule, Rfl: 3  •  doxycycline (VIBRAMYICN) 100 MG tablet, Take 1 tablet by mouth 2 (Two) Times a Day., Disp: 20 tablet, Rfl: 0  •  PARoxetine (Paxil) 10 MG tablet, Take 1 tablet by mouth Every Morning., Disp: 90 tablet, Rfl: 3  MEDICATION LIST AND ALLERGIES REVIEWED.    Social History     Tobacco Use   • Smoking status: Former Smoker     Packs/day: 2.00     Years: 30.00     Pack years: 60.00     Types: Cigarettes     Quit date:      Years since quittin.3   • Smokeless tobacco: Never Used   • Tobacco comment: fomer smoker 2 ppd x 30 years-Still smoke 1 cig, sporadically   Vaping Use   • Vaping Use: Never used   Substance Use Topics   • Alcohol use: Not Currently   • Drug use: Yes     Types: Marijuana     Comment:  "occasionally smoked marijuana       FAMILY AND SOCIAL HISTORY REVIEWED.    Review of Systems   Constitutional: Positive for activity change and fatigue. Negative for appetite change, fever and unexpected weight change.   HENT: Positive for congestion, postnasal drip and rhinorrhea. Negative for sinus pressure, sore throat and voice change.    Eyes: Negative for visual disturbance.   Respiratory: Positive for cough, chest tightness and shortness of breath. Negative for wheezing.    Cardiovascular: Negative for chest pain, palpitations and leg swelling.   Gastrointestinal: Negative for abdominal distention, abdominal pain, nausea and vomiting.   Endocrine: Negative for cold intolerance and heat intolerance.   Genitourinary: Negative for difficulty urinating and urgency.   Musculoskeletal: Negative for arthralgias, back pain and neck pain.   Skin: Negative for color change and pallor.   Allergic/Immunologic: Negative for environmental allergies and food allergies.   Neurological: Negative for dizziness, syncope, weakness and light-headedness.   Hematological: Negative for adenopathy. Does not bruise/bleed easily.   Psychiatric/Behavioral: Negative for agitation and behavioral problems. The patient is nervous/anxious.    .    /68 (BP Location: Left arm, Patient Position: Sitting, Cuff Size: Adult)   Pulse 86   Temp 98.2 °F (36.8 °C) (Infrared)   Ht 170.2 cm (67\")   Wt 67.1 kg (148 lb)   LMP  (LMP Unknown) Comment: last mammogram 2020  SpO2 92% Comment: resting, room air  Breastfeeding No   BMI 23.18 kg/m²     Immunization History   Administered Date(s) Administered   • COVID-19 (PFIZER) PURPLE CAP 07/18/2021, 08/10/2021   • Flu Vaccine Quad PF >36MO 10/04/2016, 01/30/2019   • FluLaval/Fluarix/Fluzone >6 10/22/2020   • Influenza TIV (IM) 10/20/2008   • Influenza, Unspecified 10/04/2016   • Pneumococcal Polysaccharide (PPSV23) 10/22/2020   • Tdap 06/29/2010, 06/29/2010       Physical Exam  Vitals reviewed. "   Constitutional:       Appearance: She is well-developed.   HENT:      Head: Normocephalic and atraumatic.   Eyes:      Pupils: Pupils are equal, round, and reactive to light.   Cardiovascular:      Rate and Rhythm: Normal rate and regular rhythm.      Heart sounds: Normal heart sounds.   Pulmonary:      Effort: Pulmonary effort is normal.      Breath sounds: Normal breath sounds. No wheezing or rales.   Chest:      Chest wall: No tenderness.   Abdominal:      General: Bowel sounds are normal.      Palpations: Abdomen is soft.   Musculoskeletal:         General: No swelling. Normal range of motion.      Cervical back: Normal range of motion and neck supple.   Skin:     General: Skin is warm and dry.      Capillary Refill: Capillary refill takes less than 2 seconds.   Neurological:      Mental Status: She is alert and oriented to person, place, and time.   Psychiatric:         Mood and Affect: Mood normal.         Behavior: Behavior normal.           RESULTS    6-minute walk test: Patient ambulated 1200 feet and desaturated to 87%, she was placed on 3 L pulsed dose and recovered above 90% through the remainder of the testing.  She does meet qualifications for oxygen with activity.  Her percent of predicted was 73%.    PROBLEM LIST    Problem List Items Addressed This Visit        Mental Health    Anxiety disorder    Relevant Medications    PARoxetine (Paxil) 10 MG tablet       Pulmonary and Pneumonias    COPD with acute exacerbation    Relevant Medications    predniSONE (DELTASONE) 10 MG tablet    doxycycline (VIBRAMYICN) 100 MG tablet    Pulmonary emphysema (HCC) - Primary    Overview     Description: A.  Moderate to severe obstruction, former tobacco abuse.  B.  Controlled with the azithromycin every Monday Wednesday Friday.           Relevant Medications    predniSONE (DELTASONE) 10 MG tablet    Other Relevant Orders    6 Minute Walk Test (Completed)       Sleep    Nocturnal hypoxia on 2L NC              DISCUSSION    Ms. Huff was here for renewal of her oxygen.  We did review her 6-minute walk test and she does meet qualifications for oxygen at 3 L pulsed dose with activity.  She will continue wear her oxygen at 3 L continuously at nighttime for her nocturnal hypoxia.    I will send an antibiotic and prednisone taper for her today.    She was requesting Klonopin for anxiety today in the office and I did advise her that I cannot call this in for her today.  I will start her on Paxil 10 mg daily for her anxiety.  I did encourage her to take this every day and it would take up to 6 weeks for this to totally take into effect.  I do think that this will help her in the long-term.    She will continue to use her Breo and Spiriva daily.  I also encouraged her to use her DuoNeb's as needed for her emphysema.    She will keep her follow-up appointment in September as previously scheduled.  I did advise her to call with any additional concerns or worsening symptoms.    Level of service justified based on 36 minutes spent in patient care on this date of service including, but not limited to: preparing to see the patient, obtaining and/or reviewing history, performing medically appropriate examination, ordering tests/medicine/procedures, independently interpreting results, documenting clinical information in EHR, and counseling/education of patient/family/caregiver. (Level 4 30-39 minutes; Level 5 40-54 minutes)      HERNAN Rizzo  05/03/202211:03 EDT  Electronically signed     Please note that portions of this note were completed with a voice recognition program.        CC: Alfonso Denney MD

## 2022-06-08 ENCOUNTER — TELEPHONE (OUTPATIENT)
Dept: PULMONOLOGY | Facility: CLINIC | Age: 62
End: 2022-06-08

## 2022-06-08 NOTE — TELEPHONE ENCOUNTER
Patient wants to know if she can take an extra prednisone tablet for the next couple of days. She is having difficulty with breathing due to stress, and the weather.     She also would like to discuss if mold spores could be in her lungs.

## 2022-06-18 DIAGNOSIS — J44.1 COPD EXACERBATION: ICD-10-CM

## 2022-06-20 RX ORDER — MONTELUKAST SODIUM 10 MG/1
10 TABLET ORAL NIGHTLY
Qty: 90 TABLET | Refills: 3 | Status: SHIPPED | OUTPATIENT
Start: 2022-06-20

## 2022-07-11 ENCOUNTER — PREP FOR SURGERY (OUTPATIENT)
Dept: OTHER | Facility: HOSPITAL | Age: 62
End: 2022-07-11

## 2022-07-11 DIAGNOSIS — I48.0 PAROXYSMAL ATRIAL FIBRILLATION: Primary | ICD-10-CM

## 2022-07-22 ENCOUNTER — PRE-ADMISSION TESTING (OUTPATIENT)
Dept: PREADMISSION TESTING | Facility: HOSPITAL | Age: 62
End: 2022-07-22

## 2022-07-22 LAB — SARS-COV-2 RNA PNL SPEC NAA+PROBE: NOT DETECTED

## 2022-07-22 PROCEDURE — U0004 COV-19 TEST NON-CDC HGH THRU: HCPCS

## 2022-07-22 PROCEDURE — C9803 HOPD COVID-19 SPEC COLLECT: HCPCS

## 2022-07-25 ENCOUNTER — HOSPITAL ENCOUNTER (OUTPATIENT)
Dept: CARDIOLOGY | Facility: HOSPITAL | Age: 62
Discharge: HOME | End: 2022-07-25
Admitting: INTERNAL MEDICINE

## 2022-07-25 VITALS
HEIGHT: 67 IN | DIASTOLIC BLOOD PRESSURE: 69 MMHG | SYSTOLIC BLOOD PRESSURE: 109 MMHG | WEIGHT: 145 LBS | RESPIRATION RATE: 18 BRPM | BODY MASS INDEX: 22.76 KG/M2 | OXYGEN SATURATION: 92 % | HEART RATE: 61 BPM

## 2022-07-25 DIAGNOSIS — I48.91 ATRIAL FIBRILLATION WITH RAPID VENTRICULAR RESPONSE: ICD-10-CM

## 2022-07-25 DIAGNOSIS — I48.21 PERMANENT ATRIAL FIBRILLATION: ICD-10-CM

## 2022-07-25 LAB
BH CV VAS BP RIGHT ARM: NORMAL MMHG
LV EF 2D ECHO EST: 60 %
MAXIMAL PREDICTED HEART RATE: 156 BPM
STRESS TARGET HR: 133 BPM

## 2022-07-25 PROCEDURE — 93312 ECHO TRANSESOPHAGEAL: CPT | Performed by: INTERNAL MEDICINE

## 2022-07-25 PROCEDURE — 25010000002 FENTANYL CITRATE (PF) 50 MCG/ML SOLUTION: Performed by: INTERNAL MEDICINE

## 2022-07-25 PROCEDURE — 93321 DOPPLER ECHO F-UP/LMTD STD: CPT

## 2022-07-25 PROCEDURE — 93325 DOPPLER ECHO COLOR FLOW MAPG: CPT

## 2022-07-25 PROCEDURE — 93321 DOPPLER ECHO F-UP/LMTD STD: CPT | Performed by: INTERNAL MEDICINE

## 2022-07-25 PROCEDURE — 93325 DOPPLER ECHO COLOR FLOW MAPG: CPT | Performed by: INTERNAL MEDICINE

## 2022-07-25 PROCEDURE — 99152 MOD SED SAME PHYS/QHP 5/>YRS: CPT

## 2022-07-25 PROCEDURE — 25010000002 MIDAZOLAM PER 1 MG: Performed by: INTERNAL MEDICINE

## 2022-07-25 PROCEDURE — 93312 ECHO TRANSESOPHAGEAL: CPT

## 2022-07-25 PROCEDURE — 99152 MOD SED SAME PHYS/QHP 5/>YRS: CPT | Performed by: INTERNAL MEDICINE

## 2022-07-25 RX ORDER — MIDAZOLAM HYDROCHLORIDE 1 MG/ML
INJECTION INTRAMUSCULAR; INTRAVENOUS
Status: COMPLETED | OUTPATIENT
Start: 2022-07-25 | End: 2022-07-25

## 2022-07-25 RX ORDER — FENTANYL CITRATE 50 UG/ML
INJECTION, SOLUTION INTRAMUSCULAR; INTRAVENOUS
Status: COMPLETED | OUTPATIENT
Start: 2022-07-25 | End: 2022-07-25

## 2022-07-25 RX ADMIN — FENTANYL CITRATE 50 MCG: 50 INJECTION, SOLUTION INTRAMUSCULAR; INTRAVENOUS at 10:07

## 2022-07-25 RX ADMIN — MIDAZOLAM HYDROCHLORIDE 2 MG: 1 INJECTION, SOLUTION INTRAMUSCULAR; INTRAVENOUS at 10:07

## 2022-07-25 NOTE — H&P
[facility] H&P Note      Referring Provider: Maximus Solomon DO  Primary Provider:  Alfonso Denney MD      PROBLEM LIST:  Persistent Atrial fibrillation  CHADSVASC = 3  New onset, 9/21/2020  Echo 9/21/2020: EF 26 to 30%, left atrial cavity moderately dilated, small PFO with right to left shunting, borderline RV dilation, moderately reduced RV systolic function, moderate TR, RVSP 45 to 55 mmHg.  All antiarrhythmic drugs deemed contraindicated due to torsade w/ Tikosyn and structural heart disease with moderate PFO with right to left shunting on IRMA September 2020.  Medtronic Micra VR Leadless PM implant 9/30/2020 with AVN ablation  S/P Watchman LAAC device implant 2/9/2021  Endothelialization noted on prior IRMA in 3/30/2021  Dyslipidemia  COPD  Former tobacco user  Anxiety disorder  GERD      HISTORY OF PRESENT ILLNESS:  Nolvia Huff is a 62 y.o. female with the above noted pmhx who presents today for transesophageal echocardiogram for check up of her Watchman device.  She underwent 27 mm watchman FLX device implantation on 2/9/2021 with Dr. Solomon.  Endothelialization completed and noted on IRMA 3/30/2021. She denies dyspnea, lower extremity edema, lightheadedness, syncope, orthopnea, or PND.  She states that she wears her 02 every night and sometimes during the day if she feels like she needs it.  No complaints.       REVIEW OF SYSTEMS  Pertinent positives are listed in the HPI and all other ROS are negative.      SOCIAL HISTORY:   reports that she quit smoking about 18 years ago. Her smoking use included cigarettes. She has a 60.00 pack-year smoking history. She has never used smokeless tobacco. She reports previous alcohol use. She reports current drug use. Drug: Marijuana.     FAMILY HISTORY:  family history includes Asthma in her brother; Breast cancer in her sister; Cancer in her maternal grandfather and mother; Cerebral palsy in her sister; Emphysema in her mother; Heart disease in her maternal  grandmother; Heart murmur in her brother and father; No Known Problems in her paternal grandfather, paternal grandmother, and son; Other in her son.     CURRENT MEDICATIONS:      Current Outpatient Medications:     albuterol sulfate HFA (ProAir HFA) 108 (90 Base) MCG/ACT inhaler, Inhale 2 puffs Every 4 (Four) Hours As Needed for Wheezing., Disp: 18 g, Rfl: 5    ipratropium-albuterol (DUO-NEB) 0.5-2.5 mg/3 ml nebulizer, Take 3 mL by nebulization 4 (Four) Times a Day As Needed for Wheezing., Disp: 360 mL, Rfl: 3    PARoxetine (Paxil) 10 MG tablet, Take 1 tablet by mouth Every Morning., Disp: 90 tablet, Rfl: 3    predniSONE (DELTASONE) 10 MG tablet, Take 4 tabs daily x 3 days, then take 3 tabs daily x 3 days, then take 2 tabs daily x 3 days, then take 1 tab daily x 3 days, Disp: 30 tablet, Rfl: 1    tiotropium (Spiriva HandiHaler) 18 MCG per inhalation capsule, Place 1 capsule into inhaler and inhale Daily., Disp: 90 capsule, Rfl: 3    doxycycline (VIBRAMYICN) 100 MG tablet, Take 1 tablet by mouth 2 (Two) Times a Day., Disp: 20 tablet, Rfl: 0    Fluticasone Furoate-Vilanterol (Breo Ellipta) 200-25 MCG/INH inhaler, Inhale 1 puff Daily., Disp: 60 each, Rfl: 6    guaiFENesin (MUCINEX) 600 MG 12 hr tablet, Take 1 tablet by mouth 2 (Two) Times a Day. (Patient taking differently: Take 600 mg by mouth Daily As Needed.), Disp: 60 tablet, Rfl: 6    montelukast (SINGULAIR) 10 MG tablet, Take 1 tablet by mouth Every Night., Disp: 90 tablet, Rfl: 3    O2 (OXYGEN), Inhale 2 L/min Every Night., Disp: , Rfl:     omeprazole (priLOSEC) 40 MG capsule, Take 1 capsule by mouth Daily. (Patient taking differently: Take 40 mg by mouth As Needed.), Disp: 30 capsule, Rfl: 11     Allergies:  Penicillins and Tikosyn [dofetilide]    Objective     Vital Sign Min/Max for last 24 hours  No data recorded   BP  Min: 127/78  Max: 127/78   Pulse  Min: 61  Max: 61   No data recorded   No data recorded   No data recorded   Weight  Min: 65.8 kg (145 lb)   "Max: 65.8 kg (145 lb)     Flowsheet Rows      Flowsheet Row First Filed Value   Admission Height 170.2 cm (67.01\") Documented at 07/25/2022 0924   Admission Weight 65.8 kg (145 lb) Documented at 07/25/2022 0924            PHYSICAL EXAM:  GENERAL: This is a well-developed, well-nourished, female who is in no acute distress.   SKIN: Pink and warm without rash or abnormality noted.   HEENT: Head is normocephalic and atraumatic. Pupils are equal and reactive to light bilaterally. Mucous membranes are pink and moist.   NECK: Supple without lymphadenopathy or thyromegaly. There is no jugular venous distention at 30°.  LUNGS: Clear to auscultation bilaterally without wheezing, rhonchi, or rales noted.   CARDIOVASCULAR: The heart is irregularly irregular. There is no murmur, gallop, rub, or click appreciated. The PMI is nondisplaced. Carotid upstrokes are 2+ and symmetrical without bruits.   ABDOMEN: Soft and nondistended with positive bowel sounds x4. The patient denies tenderness of palpitation.   MUSCULOSKELETAL: There are no obvious bony abnormalities. Normal range of tenderness to palpation.   NEUROLOGICAL: Nonfocal. Alert and oriented x3.   PERIPHERAL VASCULAR:  Posterior tibial and dorsalis pedis pulses are 2+ and symmetrical. There is no peripheral edema.   PSYCH: Normal mood and affect.         Tele: Atrial fibrillation    LABS:      Lab Results   Component Value Date    WBC 7.28 02/05/2021    HGB 15.4 02/05/2021    HCT 47.1 (H) 02/05/2021    MCV 89.4 02/05/2021     02/05/2021     Lab Results   Component Value Date    GLUCOSE 116 (H) 02/05/2021    BUN 9 02/05/2021    CREATININE 0.68 02/05/2021    EGFRIFNONA 87 02/05/2021    BCR 13.2 02/05/2021    K 4.5 02/05/2021    CO2 28.0 02/05/2021    CALCIUM 9.5 02/05/2021    ALBUMIN 3.50 09/22/2020    AST 21 09/22/2020    ALT 68 (H) 09/22/2020     Lab Results   Component Value Date    TROPONINT <0.010 09/21/2020     Lab Results   Component Value Date    INR 1.14 " 09/21/2020    PROTIME 14.3 (H) 09/21/2020     Lab Results   Component Value Date    CHOL 198 09/03/2019    TRIG 76 09/03/2019    HDL 54 09/03/2019     (H) 09/03/2019        Results Review: I reviewed the patient's new clinical results.      ASSESSMENT:    Persistent atrial fibrillation  [id number] of 3.  Status post 27 mm watchman FL X device on 2/9/2021 with  Endothelialization completed as noted on 3/30/2021 IRMA  Status post Medtronic Micra leadless PM implant 9/30/2021 with AV node ablation.  Hyperlipidemia  COPD      PLAN:      Transesophageal echocardiogram today with Dr. Rubio. The risks, benefits, and alternatives of the procedure have been reviewed and the patient wishes to proceed.       I discussed the patient's findings and my recommendations with patient.    Electronically signed by HERANN Guillory, 07/25/22, 9:29 AM EDT.    Bebe Rubio MD, FACC

## 2022-08-16 ENCOUNTER — OFFICE VISIT (OUTPATIENT)
Dept: CARDIOLOGY | Facility: HOSPITAL | Age: 62
End: 2022-08-16

## 2022-08-16 VITALS
DIASTOLIC BLOOD PRESSURE: 64 MMHG | HEART RATE: 61 BPM | WEIGHT: 142.56 LBS | HEIGHT: 67 IN | OXYGEN SATURATION: 93 % | RESPIRATION RATE: 16 BRPM | SYSTOLIC BLOOD PRESSURE: 107 MMHG | TEMPERATURE: 97.1 F | BODY MASS INDEX: 22.37 KG/M2

## 2022-08-16 DIAGNOSIS — E78.5 DYSLIPIDEMIA: ICD-10-CM

## 2022-08-16 DIAGNOSIS — Z95.0 CARDIAC PACEMAKER IN SITU: ICD-10-CM

## 2022-08-16 DIAGNOSIS — I48.21 PERMANENT ATRIAL FIBRILLATION: Primary | ICD-10-CM

## 2022-08-16 PROCEDURE — 99214 OFFICE O/P EST MOD 30 MIN: CPT | Performed by: NURSE PRACTITIONER

## 2022-08-16 RX ORDER — ASPIRIN 81 MG/1
81 TABLET ORAL DAILY
COMMUNITY
Start: 2022-07-19

## 2022-08-16 NOTE — PROGRESS NOTES
"Chief Complaint  Atrial Fibrillation, Hypertension, and Follow-up    Subjective    History of Present Illness {  Problem List  Visit  Diagnosis   Encounters  Notes  Medications  Labs  Result Review Imaging  Media :23}       History of Present Illness     62-year-old female presents the office today for ongoing evaluation of her Atrial fibrillation and complete heart block.  She is followed by Dr. Solomon.  Permanent pacemaker in place.  She is currently raising her 2 year old grandson.  Notes that she experiences more dyspnea in the summer months. She also notes increased palpitations are the last few weeks due to increased stress. Patient recently lost her mother and is raising 3 children (2 teenagers and a 2 year old ) Currently denies chest pain,  presyncope, syncope, orthopnea, PND or pedal edema.  Patient also has watchman implanted and COPD. Recently had IRMA to check watchman placement   Objective     Vital Signs:   Vitals:    08/16/22 0949   BP: 107/64   BP Location: Left arm   Patient Position: Sitting   Cuff Size: Adult   Pulse: 61   Resp: 16   Temp: 97.1 °F (36.2 °C)   TempSrc: Temporal   SpO2: 93%   Weight: 64.7 kg (142 lb 9 oz)   Height: 170.2 cm (67\")     Body mass index is 22.33 kg/m².  Physical Exam  Vitals and nursing note reviewed.   Constitutional:       Appearance: Normal appearance.   HENT:      Head: Normocephalic.   Eyes:      Pupils: Pupils are equal, round, and reactive to light.   Cardiovascular:      Rate and Rhythm: Normal rate and regular rhythm.      Pulses: Normal pulses.      Heart sounds: Normal heart sounds. No murmur heard.  Pulmonary:      Effort: Pulmonary effort is normal.      Breath sounds: Normal breath sounds.   Abdominal:      General: Bowel sounds are normal.      Palpations: Abdomen is soft.   Musculoskeletal:         General: Normal range of motion.      Cervical back: Normal range of motion.      Right lower leg: No edema.      Left lower leg: No edema. "   Skin:     General: Skin is warm and dry.      Capillary Refill: Capillary refill takes less than 2 seconds.   Neurological:      Mental Status: She is alert and oriented to person, place, and time.   Psychiatric:         Mood and Affect: Mood normal.         Thought Content: Thought content normal.              Result Review  Data Reviewed:{ Labs  Result Review  Imaging  Med Tab  Media :23}   Echo 3/21:      ·  Left ventricular ejection fraction appears to be 51 - 55%. Left ventricular systolic function is normal.  · The left atrial cavity is dilated.  · The left atrial appendage is status post closure with a well-seated 27 mm Watchman FLX device. There was no peridevice leak noted.  · The right atrial cavity is dilated.  · Mild mitral valve regurgitation is present.  · Mild tricuspid valve regurgitation is present.  · There is mild plaque in the descending aorta present.         Assessment and Plan {CC Problem List  Visit Diagnosis  ROS  Review (Popup)  Health Maintenance  Quality  BestPractice  Medications  SmartSets  SnapShot Encounters  Media :23}   1. Permanent atrial fibrillation (HCC)  CHADS-VASc Risk Assessment            2 Total Score    1 Age 65-74    1 Sex: Female        Criteria that do not apply:    CHF    Hypertension    Age >/= 75    DM    PRIOR STROKE/TIA/THROMBO    Vascular Disease        Watchman in place     2. Cardiac pacemaker in situ  Follows with Dr Solomon     3. Dyslipidemia  Diet controlled     Patient has requested a follow up in [facility] in 3-4 months   Follow Up {Instructions Charge Capture  Follow-up Communications :23}   Return in about 4 months (around 12/16/2022) for Office visit, HF, AFIB.    Patient was given instructions and counseling regarding her condition or for health maintenance advice. Please see specific information pulled into the AVS if appropriate.  Patient was instructed to call the [facility] with any questions, concerns, or worsening  symptoms.

## 2022-09-07 ENCOUNTER — LAB (OUTPATIENT)
Dept: LAB | Facility: HOSPITAL | Age: 62
End: 2022-09-07

## 2022-09-07 ENCOUNTER — OFFICE VISIT (OUTPATIENT)
Dept: INTERNAL MEDICINE | Facility: CLINIC | Age: 62
End: 2022-09-07

## 2022-09-07 VITALS
RESPIRATION RATE: 20 BRPM | DIASTOLIC BLOOD PRESSURE: 70 MMHG | BODY MASS INDEX: 21.58 KG/M2 | SYSTOLIC BLOOD PRESSURE: 114 MMHG | WEIGHT: 137.5 LBS | HEART RATE: 64 BPM | TEMPERATURE: 97.8 F | HEIGHT: 67 IN

## 2022-09-07 DIAGNOSIS — J43.2 CENTRILOBULAR EMPHYSEMA: ICD-10-CM

## 2022-09-07 DIAGNOSIS — R82.998 LEUKOCYTES IN URINE: Primary | ICD-10-CM

## 2022-09-07 DIAGNOSIS — Z00.00 WELL ADULT EXAM: ICD-10-CM

## 2022-09-07 DIAGNOSIS — R30.0 DYSURIA: ICD-10-CM

## 2022-09-07 DIAGNOSIS — Z12.11 COLON CANCER SCREENING: ICD-10-CM

## 2022-09-07 DIAGNOSIS — R31.9 HEMATURIA, UNSPECIFIED TYPE: ICD-10-CM

## 2022-09-07 DIAGNOSIS — Z23 NEED FOR PNEUMOCOCCAL VACCINE: Primary | ICD-10-CM

## 2022-09-07 DIAGNOSIS — E78.5 DYSLIPIDEMIA: ICD-10-CM

## 2022-09-07 LAB
BILIRUB BLD-MCNC: NEGATIVE MG/DL
CLARITY, POC: ABNORMAL
COLOR UR: YELLOW
DEPRECATED RDW RBC AUTO: 37.2 FL (ref 37–54)
ERYTHROCYTE [DISTWIDTH] IN BLOOD BY AUTOMATED COUNT: 11.7 % (ref 12.3–15.4)
EXPIRATION DATE: ABNORMAL
GLUCOSE UR STRIP-MCNC: NEGATIVE MG/DL
HCT VFR BLD AUTO: 46.8 % (ref 34–46.6)
HGB BLD-MCNC: 16.1 G/DL (ref 12–15.9)
KETONES UR QL: NEGATIVE
LEUKOCYTE EST, POC: ABNORMAL
Lab: ABNORMAL
MCH RBC QN AUTO: 30.7 PG (ref 26.6–33)
MCHC RBC AUTO-ENTMCNC: 34.4 G/DL (ref 31.5–35.7)
MCV RBC AUTO: 89.3 FL (ref 79–97)
NITRITE UR-MCNC: NEGATIVE MG/ML
PH UR: 5 [PH] (ref 5–8)
PLATELET # BLD AUTO: 276 10*3/MM3 (ref 140–450)
PMV BLD AUTO: 11 FL (ref 6–12)
PROT UR STRIP-MCNC: NEGATIVE MG/DL
RBC # BLD AUTO: 5.24 10*6/MM3 (ref 3.77–5.28)
RBC # UR STRIP: ABNORMAL /UL
SP GR UR: 1.03 (ref 1–1.03)
UROBILINOGEN UR QL: ABNORMAL
WBC NRBC COR # BLD: 8.49 10*3/MM3 (ref 3.4–10.8)

## 2022-09-07 PROCEDURE — 90471 IMMUNIZATION ADMIN: CPT | Performed by: INTERNAL MEDICINE

## 2022-09-07 PROCEDURE — 81015 MICROSCOPIC EXAM OF URINE: CPT

## 2022-09-07 PROCEDURE — 90677 PCV20 VACCINE IM: CPT | Performed by: INTERNAL MEDICINE

## 2022-09-07 PROCEDURE — 85027 COMPLETE CBC AUTOMATED: CPT | Performed by: INTERNAL MEDICINE

## 2022-09-07 PROCEDURE — 84443 ASSAY THYROID STIM HORMONE: CPT | Performed by: INTERNAL MEDICINE

## 2022-09-07 PROCEDURE — 99396 PREV VISIT EST AGE 40-64: CPT | Performed by: INTERNAL MEDICINE

## 2022-09-07 PROCEDURE — 80061 LIPID PANEL: CPT | Performed by: INTERNAL MEDICINE

## 2022-09-07 PROCEDURE — 80053 COMPREHEN METABOLIC PANEL: CPT | Performed by: INTERNAL MEDICINE

## 2022-09-07 PROCEDURE — 81003 URINALYSIS AUTO W/O SCOPE: CPT | Performed by: INTERNAL MEDICINE

## 2022-09-07 PROCEDURE — 84439 ASSAY OF FREE THYROXINE: CPT | Performed by: INTERNAL MEDICINE

## 2022-09-07 PROCEDURE — 87086 URINE CULTURE/COLONY COUNT: CPT

## 2022-09-07 NOTE — PROGRESS NOTES
"Chief Complaint  Annual Exam    Subjective    Nolvia Huff is a 62 y.o. female.     Nolvia Huff presents to [facility] for       History of Present Illness    The following portions of the patient's history were reviewed and updated as appropriate: allergies, current medications, past family history, past medical history, past social history, past surgical history and problem list.        Complete Adult Physical    1 UTI symptoms-patient says that she has been having more frequent and mild dysuria. She also    2 has been told that she was pre diabetics      Diet: Regular        Exercise: Minimal to none        Social History         Preventative Screenings  Mammogram needs to schedule     Colonoscopy - would like to do the Cologuard         Immunizations: Up-to-date            Review of Systems   All other systems reviewed and are negative.      Objective   Vital Signs:   /70 (BP Location: Right arm, Patient Position: Sitting, Cuff Size: Adult)   Pulse 64   Temp 97.8 °F (36.6 °C) (Temporal)   Resp 20   Ht 170.2 cm (67\")   Wt 62.4 kg (137 lb 8 oz)   BMI 21.54 kg/m²     Body mass index is 21.54 kg/m².    Physical Exam  Vitals and nursing note reviewed.   Constitutional:       Appearance: Normal appearance. She is normal weight.   HENT:      Head: Normocephalic.      Right Ear: Tympanic membrane, ear canal and external ear normal.      Left Ear: Tympanic membrane, ear canal and external ear normal.      Nose: Nose normal.      Mouth/Throat:      Mouth: Mucous membranes are moist.   Eyes:      Extraocular Movements: Extraocular movements intact.      Pupils: Pupils are equal, round, and reactive to light.   Cardiovascular:      Rate and Rhythm: Normal rate and regular rhythm.      Pulses: Normal pulses.      Heart sounds: Normal heart sounds.   Pulmonary:      Effort: Pulmonary effort is normal.      Breath sounds: Normal breath sounds.   Abdominal: "      General: Bowel sounds are normal.      Palpations: Abdomen is soft.   Musculoskeletal:         General: Normal range of motion.      Cervical back: Normal range of motion and neck supple.   Skin:     General: Skin is warm.   Neurological:      Mental Status: She is alert.   Psychiatric:         Mood and Affect: Mood normal.         Behavior: Behavior normal.         Thought Content: Thought content normal.         Judgment: Judgment normal.               Assessment and Plan  Diagnoses and all orders for this visit:      Diagnoses and all orders for this visit:    1. Need for pneumococcal vaccine (Primary)  -     Pneumococcal Conjugate Vaccine 20-Valent (PCV20)    2. Well adult exam  -     CBC (No Diff); Future  -     Comprehensive Metabolic Panel; Future  -     T4, Free; Future  -     TSH; Future  -     CBC (No Diff)  -     Comprehensive Metabolic Panel  -     T4, Free  -     TSH    3. Dyslipidemia  -     Lipid Panel; Future  -     Lipid Panel    4. Centrilobular emphysema (HCC)-continue on inhalers    5. Colon cancer screening  -     Cologuard - Stool, Per Rectum; Future    6. Dysuria  -     POC Urinalysis Dipstick, Automated    Anticipatory guidance:  Recommend routine dental visit.  Recommend routine ophthalmology visit

## 2022-09-08 LAB
ALBUMIN SERPL-MCNC: 4.4 G/DL (ref 3.5–5.2)
ALBUMIN/GLOB SERPL: 2.2 G/DL
ALP SERPL-CCNC: 58 U/L (ref 39–117)
ALT SERPL W P-5'-P-CCNC: 14 U/L (ref 1–33)
ANION GAP SERPL CALCULATED.3IONS-SCNC: 8 MMOL/L (ref 5–15)
AST SERPL-CCNC: 18 U/L (ref 1–32)
BACTERIA UR QL AUTO: ABNORMAL /HPF
BILIRUB SERPL-MCNC: 0.3 MG/DL (ref 0–1.2)
BUN SERPL-MCNC: 13 MG/DL (ref 8–23)
BUN/CREAT SERPL: 20 (ref 7–25)
CALCIUM SPEC-SCNC: 9.5 MG/DL (ref 8.6–10.5)
CHLORIDE SERPL-SCNC: 100 MMOL/L (ref 98–107)
CHOLEST SERPL-MCNC: 180 MG/DL (ref 0–200)
CO2 SERPL-SCNC: 34 MMOL/L (ref 22–29)
COD CRY URNS QL: ABNORMAL /HPF
CREAT SERPL-MCNC: 0.65 MG/DL (ref 0.57–1)
EGFRCR SERPLBLD CKD-EPI 2021: 98.5 ML/MIN/1.73
GLOBULIN UR ELPH-MCNC: 2 GM/DL
GLUCOSE SERPL-MCNC: 85 MG/DL (ref 65–99)
HDLC SERPL-MCNC: 48 MG/DL (ref 40–60)
HYALINE CASTS UR QL AUTO: ABNORMAL /LPF
LDLC SERPL CALC-MCNC: 116 MG/DL (ref 0–100)
LDLC/HDLC SERPL: 2.4 {RATIO}
POTASSIUM SERPL-SCNC: 4.4 MMOL/L (ref 3.5–5.2)
PROT SERPL-MCNC: 6.4 G/DL (ref 6–8.5)
RBC # UR STRIP: ABNORMAL /HPF
REF LAB TEST METHOD: ABNORMAL
SODIUM SERPL-SCNC: 142 MMOL/L (ref 136–145)
SQUAMOUS #/AREA URNS HPF: ABNORMAL /HPF
T4 FREE SERPL-MCNC: 1.38 NG/DL (ref 0.93–1.7)
TRIGL SERPL-MCNC: 85 MG/DL (ref 0–150)
TSH SERPL DL<=0.05 MIU/L-ACNC: 2.14 UIU/ML (ref 0.27–4.2)
VLDLC SERPL-MCNC: 16 MG/DL (ref 5–40)
WBC # UR STRIP: ABNORMAL /HPF

## 2022-09-09 LAB — BACTERIA SPEC AEROBE CULT: NORMAL

## 2022-09-16 DIAGNOSIS — J43.2 CENTRILOBULAR EMPHYSEMA: ICD-10-CM

## 2022-09-16 RX ORDER — IPRATROPIUM BROMIDE AND ALBUTEROL SULFATE 2.5; .5 MG/3ML; MG/3ML
3 SOLUTION RESPIRATORY (INHALATION) 4 TIMES DAILY PRN
Qty: 360 ML | Refills: 3 | Status: SHIPPED | OUTPATIENT
Start: 2022-09-16

## 2022-09-27 ENCOUNTER — OFFICE VISIT (OUTPATIENT)
Dept: PULMONOLOGY | Facility: CLINIC | Age: 62
End: 2022-09-27

## 2022-09-27 VITALS
HEIGHT: 67 IN | SYSTOLIC BLOOD PRESSURE: 108 MMHG | OXYGEN SATURATION: 94 % | HEART RATE: 99 BPM | RESPIRATION RATE: 16 BRPM | BODY MASS INDEX: 21.85 KG/M2 | TEMPERATURE: 98.2 F | DIASTOLIC BLOOD PRESSURE: 70 MMHG | WEIGHT: 139.2 LBS

## 2022-09-27 DIAGNOSIS — J43.9 PULMONARY EMPHYSEMA, UNSPECIFIED EMPHYSEMA TYPE: Primary | ICD-10-CM

## 2022-09-27 DIAGNOSIS — Z87.891 PERSONAL HISTORY OF TOBACCO USE, PRESENTING HAZARDS TO HEALTH: ICD-10-CM

## 2022-09-27 DIAGNOSIS — J44.1 COPD EXACERBATION: ICD-10-CM

## 2022-09-27 DIAGNOSIS — J43.2 CENTRILOBULAR EMPHYSEMA: ICD-10-CM

## 2022-09-27 DIAGNOSIS — G47.34 NOCTURNAL HYPOXIA: ICD-10-CM

## 2022-09-27 DIAGNOSIS — K21.9 GASTROESOPHAGEAL REFLUX DISEASE WITHOUT ESOPHAGITIS: ICD-10-CM

## 2022-09-27 PROCEDURE — 99214 OFFICE O/P EST MOD 30 MIN: CPT | Performed by: NURSE PRACTITIONER

## 2022-09-27 RX ORDER — ALBUTEROL SULFATE 90 UG/1
2 AEROSOL, METERED RESPIRATORY (INHALATION) EVERY 4 HOURS PRN
Qty: 6.7 G | Refills: 5 | Status: SHIPPED | OUTPATIENT
Start: 2022-09-27 | End: 2023-03-27 | Stop reason: SDUPTHER

## 2022-09-27 RX ORDER — PREDNISONE 10 MG/1
TABLET ORAL
Qty: 30 TABLET | Refills: 1 | Status: SHIPPED | OUTPATIENT
Start: 2022-09-27 | End: 2022-12-12

## 2022-09-27 RX ORDER — GUAIFENESIN 600 MG/1
600 TABLET, EXTENDED RELEASE ORAL 2 TIMES DAILY
Qty: 120 TABLET | Refills: 6 | Status: SHIPPED | OUTPATIENT
Start: 2022-09-27

## 2022-09-27 NOTE — PROGRESS NOTES
[facility] Follow up    CHIEF COMPLAINT    Dyspnea with exertion    HISTORY OF PRESENT ILLNESS    Nolvia Huff is a 62 y.o.female here today for follow-up.  She was last seen in the office by me in May.  She denies any respiratory illnesses since her last appointment.    She states that she was very stressed out at her last appointment and has improved her stress levels and her breathing has improved.    She continues to use her Breo and Spiriva daily.  She also uses her nebulizers as needed.  She takes prednisone as needed when she is more congested.      She continues to wear her oxygen at 2L at night and occasionally during the day if needed.     She has been taking paxil as needed and feels like it was helping with her anxiety and stress.    She denies sputum production and hemoptysis.  She denies chest pain, palpitations and lower extremity edema.  She denies calf tenderness.      She occasionally has reflux symptoms and takes omeprazole daily.      She is up to date on her vaccinations.    She occasionally smokes marijuana and quit smoking in 2004.  She has a 60 pack history.  Her last low dose was in February 2022.      Patient Active Problem List   Diagnosis   • Other specified anxiety disorders   • Dyslipidemia   • Emphysema/COPD (HCC)   • GERD    • Osteoporosis   • Personal history of tobacco use, presenting hazards to health   • COPD with acute exacerbation   • Nocturnal hypoxia on 2L NC    • Pulmonary emphysema (HCC)   • A/C Respiratory Failure Type 2   • Former smoker   • Complete heart block (HCC)   • Cardiac pacemaker in situ   • Permanent atrial fibrillation (HCC)   • Presence of Watchman left atrial appendage closure device       Allergies   Allergen Reactions   • Penicillins Anaphylaxis   • Tikosyn [Dofetilide] Other (See Comments)     Torsades       Current Outpatient Medications:   •  albuterol sulfate HFA (ProAir HFA) 108 (90 Base) MCG/ACT inhaler, Inhale 2 puffs Every 4 (Four) Hours  As Needed for Wheezing., Disp: 18 g, Rfl: 5  •  Aspirin Adult Low Strength 81 MG EC tablet, Take 81 mg by mouth Daily., Disp: , Rfl:   •  Fluticasone Furoate-Vilanterol (Breo Ellipta) 200-25 MCG/INH inhaler, Inhale 1 puff Daily., Disp: 60 each, Rfl: 11  •  guaiFENesin (Mucinex) 600 MG 12 hr tablet, Take 1 tablet by mouth 2 (Two) Times a Day., Disp: 120 tablet, Rfl: 6  •  ipratropium-albuterol (DUO-NEB) 0.5-2.5 mg/3 ml nebulizer, Take 3 mL by nebulization 4 (Four) Times a Day As Needed for Wheezing., Disp: 360 mL, Rfl: 3  •  montelukast (SINGULAIR) 10 MG tablet, Take 1 tablet by mouth Every Night., Disp: 90 tablet, Rfl: 3  •  O2 (OXYGEN), Inhale 2 L/min Every Night., Disp: , Rfl:   •  omeprazole (priLOSEC) 40 MG capsule, Take 1 capsule by mouth Daily. (Patient taking differently: Take 40 mg by mouth As Needed.), Disp: 30 capsule, Rfl: 11  •  PARoxetine (Paxil) 10 MG tablet, Take 1 tablet by mouth Every Morning., Disp: 90 tablet, Rfl: 3  •  predniSONE (DELTASONE) 10 MG tablet, Take 4 tabs daily x 3 days, then take 3 tabs daily x 3 days, then take 2 tabs daily x 3 days, then take 1 tab daily x 3 days, Disp: 30 tablet, Rfl: 1  •  tiotropium (Spiriva HandiHaler) 18 MCG per inhalation capsule, Place 1 capsule into inhaler and inhale Daily., Disp: 90 capsule, Rfl: 3  •  albuterol sulfate  (90 Base) MCG/ACT inhaler, Inhale 2 puffs Every 4 (Four) Hours As Needed for Wheezing., Disp: 6.7 g, Rfl: 5  MEDICATION LIST AND ALLERGIES REVIEWED.    Social History     Tobacco Use   • Smoking status: Former Smoker     Packs/day: 2.00     Years: 30.00     Pack years: 60.00     Types: Cigarettes     Quit date:      Years since quittin.7   • Smokeless tobacco: Never Used   • Tobacco comment: fomer smoker 2 ppd x 30 years-Still smoke 1 cig, sporadically   Vaping Use   • Vaping Use: Never used   Substance Use Topics   • Alcohol use: Not Currently   • Drug use: Yes     Types: Marijuana     Comment: occasionally smoked  "marijuana       FAMILY AND SOCIAL HISTORY REVIEWED.    Review of Systems   Constitutional: Positive for fatigue. Negative for activity change, appetite change, fever and unexpected weight change.   HENT: Positive for congestion. Negative for postnasal drip, rhinorrhea, sinus pressure, sore throat and voice change.    Eyes: Negative for visual disturbance.   Respiratory: Positive for shortness of breath. Negative for cough, chest tightness and wheezing.    Cardiovascular: Negative for chest pain, palpitations and leg swelling.   Gastrointestinal: Negative for abdominal distention, abdominal pain, nausea and vomiting.   Endocrine: Negative for cold intolerance and heat intolerance.   Genitourinary: Negative for difficulty urinating and urgency.   Musculoskeletal: Negative for arthralgias, back pain and neck pain.   Skin: Negative for color change and pallor.   Allergic/Immunologic: Negative for environmental allergies and food allergies.   Neurological: Negative for dizziness, syncope, weakness and light-headedness.   Hematological: Negative for adenopathy. Does not bruise/bleed easily.   Psychiatric/Behavioral: Negative for agitation and behavioral problems.   .    /70 (BP Location: Left arm, Patient Position: Sitting, Cuff Size: Adult)   Pulse 99   Temp 98.2 °F (36.8 °C) (Infrared)   Resp 16   Ht 170.2 cm (67\")   Wt 63.1 kg (139 lb 3.2 oz)   LMP  (LMP Unknown) Comment: last mammogram 2020  SpO2 94%   BMI 21.80 kg/m²     Immunization History   Administered Date(s) Administered   • COVID-19 (PFIZER) PURPLE CAP 07/18/2021, 08/10/2021   • Flu Vaccine Quad PF >36MO 10/04/2016, 01/30/2019   • FluLaval/Fluzone >6mos 10/22/2020   • Influenza TIV (IM) 10/20/2008   • Influenza, Unspecified 10/04/2016   • Pneumococcal Conjugate 20-Valent (PCV20) 09/07/2022   • Pneumococcal Polysaccharide (PPSV23) 10/22/2020   • Tdap 06/29/2010, 06/29/2010       Physical Exam  Vitals and nursing note reviewed.   Constitutional:  "      Appearance: She is well-developed. She is not diaphoretic.   HENT:      Head: Normocephalic and atraumatic.   Eyes:      Pupils: Pupils are equal, round, and reactive to light.   Neck:      Thyroid: No thyromegaly.   Cardiovascular:      Rate and Rhythm: Normal rate and regular rhythm.      Heart sounds: Normal heart sounds. No murmur heard.    No friction rub. No gallop.   Pulmonary:      Effort: Pulmonary effort is normal. No respiratory distress.      Breath sounds: Normal breath sounds. No wheezing or rales.   Chest:      Chest wall: No tenderness.   Abdominal:      General: Bowel sounds are normal.      Palpations: Abdomen is soft.      Tenderness: There is no abdominal tenderness.   Musculoskeletal:         General: No swelling. Normal range of motion.      Cervical back: Normal range of motion and neck supple.   Lymphadenopathy:      Cervical: No cervical adenopathy.   Skin:     General: Skin is warm and dry.      Capillary Refill: Capillary refill takes less than 2 seconds.   Neurological:      Mental Status: She is alert and oriented to person, place, and time.   Psychiatric:         Mood and Affect: Mood normal.         Behavior: Behavior normal.           RESULTS    PROBLEM LIST    Problem List Items Addressed This Visit        Gastrointestinal Abdominal     GERD        Pulmonary and Pneumonias    Pulmonary emphysema (HCC) - Primary    Overview     Description: A.  Moderate to severe obstruction, former tobacco abuse.  B.  Controlled with the azithromycin every Monday Wednesday Friday.         Relevant Medications    albuterol sulfate  (90 Base) MCG/ACT inhaler    Fluticasone Furoate-Vilanterol (Breo Ellipta) 200-25 MCG/INH inhaler    predniSONE (DELTASONE) 10 MG tablet    tiotropium (Spiriva HandiHaler) 18 MCG per inhalation capsule    guaiFENesin (Mucinex) 600 MG 12 hr tablet    Other Relevant Orders    CT chest low dose wo       Sleep    Nocturnal hypoxia on 2L NC        Tobacco    Personal  history of tobacco use, presenting hazards to health      Other Visit Diagnoses     COPD exacerbation (HCC)        Relevant Medications    albuterol sulfate  (90 Base) MCG/ACT inhaler    Fluticasone Furoate-Vilanterol (Breo Ellipta) 200-25 MCG/INH inhaler    predniSONE (DELTASONE) 10 MG tablet    tiotropium (Spiriva HandiHaler) 18 MCG per inhalation capsule    guaiFENesin (Mucinex) 600 MG 12 hr tablet            DISCUSSION    Mrs. Huff was here for follow up of her COPD.  She is doing better than she has in quite a while.  She has not had any exacerbations since her last appointment.      She will continue Breo and Spiriva daily.  I did encourage her to use albuterol as needed for shortness of breath or wheezing.    I sent in refills of her prednisone to have on hand incase she had an exacerbation.      She will continue to use oxygen at night at 2L.      She qualifies for a low dose CT scan in February 2023 based on her smoking history.  I placed the orders today.      She will continue to take omeprazole daily.  We also discussed reflux precautions.    She will follow up in 6 months or sooner if she has worsening symptoms.      Level of service justified based on 35 minutes spent in patient care on this date of service including, but not limited to: preparing to see the patient, obtaining and/or reviewing history, performing medically appropriate examination, ordering tests/medicine/procedures, independently interpreting results, documenting clinical information in EHR, and counseling/education of patient/family/caregiver. (Level 4 30-39 minutes; Level 5 40-54 minutes)      HERNAN Rizzo  09/27/202211:16 EDT  Electronically signed     Please note that portions of this note were completed with a voice recognition program.        CC: Alfonso Denney MD

## 2022-11-06 PROCEDURE — 93296 REM INTERROG EVL PM/IDS: CPT | Performed by: INTERNAL MEDICINE

## 2022-11-06 PROCEDURE — 93294 REM INTERROG EVL PM/LDLS PM: CPT | Performed by: INTERNAL MEDICINE

## 2022-12-12 ENCOUNTER — OFFICE VISIT (OUTPATIENT)
Dept: CARDIOLOGY | Facility: HOSPITAL | Age: 62
End: 2022-12-12

## 2022-12-12 VITALS
HEIGHT: 67 IN | DIASTOLIC BLOOD PRESSURE: 65 MMHG | TEMPERATURE: 97.7 F | WEIGHT: 144.06 LBS | SYSTOLIC BLOOD PRESSURE: 135 MMHG | BODY MASS INDEX: 22.61 KG/M2 | OXYGEN SATURATION: 93 % | HEART RATE: 73 BPM | RESPIRATION RATE: 18 BRPM

## 2022-12-12 DIAGNOSIS — I48.21 PERMANENT ATRIAL FIBRILLATION: Primary | ICD-10-CM

## 2022-12-12 DIAGNOSIS — Z95.0 CARDIAC PACEMAKER IN SITU: ICD-10-CM

## 2022-12-12 DIAGNOSIS — E78.5 DYSLIPIDEMIA: ICD-10-CM

## 2022-12-12 PROCEDURE — 99213 OFFICE O/P EST LOW 20 MIN: CPT | Performed by: NURSE PRACTITIONER

## 2022-12-12 NOTE — PROGRESS NOTES
"Chief Complaint  Follow-up, Atrial Fibrillation, and Congestive Heart Failure    Subjective    History of Present Illness {CC  Problem List  Visit  Diagnosis   Encounters  Notes  Medications  Labs  Result Review Imaging  Media :23}       Atrial Fibrillation  Past medical history includes atrial fibrillation and CHF.   Hypertension    Congestive Heart Failure       62-year-old female presents the office today for ongoing evaluation of her Atrial fibrillation and complete heart block.  She is followed by Dr. Solomon.  Permanent pacemaker in place.  She is currently raising her 2 year old grandson.  Notes that she experiences more dyspnea in the summer months.Notes [place] is significantly better now that the weather is colder.  Patient recently lost her mother and is raising 3 children (2 teenagers and a 2 year old ) Currently denies chest pain,  presyncope, syncope, orthopnea, PND or pedal edema.  Patient also has watchman implanted and COPD.   Objective     Vital Signs:   Vitals:    12/12/22 0957   BP: 135/65   BP Location: Left arm   Patient Position: Sitting   Cuff Size: Adult   Pulse: 73   Resp: 18   Temp: 97.7 °F (36.5 °C)   TempSrc: Temporal   SpO2: 93%   Weight: 65.3 kg (144 lb 1 oz)   Height: 170.2 cm (67\")     Body mass index is 22.56 kg/m².  Physical Exam  Vitals and nursing note reviewed.   Constitutional:       Appearance: Normal appearance.   HENT:      Head: Normocephalic.   Eyes:      Pupils: Pupils are equal, round, and reactive to light.   Cardiovascular:      Rate and Rhythm: Normal rate and regular rhythm.      Pulses: Normal pulses.      Heart sounds: Normal heart sounds. No murmur heard.  Pulmonary:      Effort: Pulmonary effort is normal.      Breath sounds: Normal breath sounds.   Abdominal:      General: Bowel sounds are normal.      Palpations: Abdomen is soft.   Musculoskeletal:         General: Normal range of motion.      Cervical back: Normal range of motion.      Right lower leg: No " edema.      Left lower leg: No edema.   Skin:     General: Skin is warm and dry.      Capillary Refill: Capillary refill takes less than 2 seconds.   Neurological:      Mental Status: She is alert and oriented to person, place, and time.   Psychiatric:         Mood and Affect: Mood normal.         Thought Content: Thought content normal.              Result Review  Data Reviewed:{ Labs  Result Review  Imaging  Med Tab  Media :23}   Echo 3/21:   ·   Left ventricular ejection fraction appears to be 51 - 55%. Left ventricular systolic function is normal.  · The left atrial cavity is dilated.  · The left atrial appendage is status post closure with a well-seated 27 mm Watchman FLX device. There was no peridevice leak noted.  · The right atrial cavity is dilated.  · Mild mitral valve regurgitation is present.  · Mild tricuspid valve regurgitation is present.  · There is mild plaque in the descending aorta present.         Assessment and Plan {CC Problem List  Visit Diagnosis  ROS  Review (Popup)  Health Maintenance  Quality  BestPractice  Medications  SmartSets  SnapShot Encounters  Media :23}   1. Permanent atrial fibrillation (HCC)  CHADS-VASc Risk Assessment            2 Total Score    1 Age 65-74    1 Sex: Female        Criteria that do not apply:    CHF    Hypertension    Age >/= 75    DM    PRIOR STROKE/TIA/THROMBO    Vascular Disease        Watchman in place     2. Cardiac pacemaker in situ  Follows with Dr Solomon     3. Dyslipidemia  Diet controlled     Patient has requested a follow up in [facility] in 3-4 months   Follow Up {Instructions Charge Capture  Follow-up Communications :23}   Return in about 5 months (around 5/12/2023) for Office visit, HF, AFIB, HTN.    Patient was given instructions and counseling regarding her condition or for health maintenance advice. Please see specific information pulled into the AVS if appropriate.  Patient was instructed to call the [facility] with any  questions, concerns, or worsening symptoms.

## 2022-12-27 DIAGNOSIS — J44.1 COPD WITH ACUTE EXACERBATION: Primary | ICD-10-CM

## 2022-12-27 RX ORDER — PREDNISONE 10 MG/1
TABLET ORAL
Qty: 31 TABLET | Refills: 0 | Status: SHIPPED | OUTPATIENT
Start: 2022-12-27 | End: 2023-03-27 | Stop reason: SDUPTHER

## 2022-12-27 RX ORDER — DOXYCYCLINE HYCLATE 100 MG
100 TABLET ORAL 2 TIMES DAILY
Qty: 20 TABLET | Refills: 0 | Status: SHIPPED | OUTPATIENT
Start: 2022-12-27 | End: 2023-03-27

## 2023-01-25 ENCOUNTER — TELEPHONE (OUTPATIENT)
Dept: CARDIOLOGY | Facility: CLINIC | Age: 63
End: 2023-01-25
Payer: COMMERCIAL

## 2023-03-06 ENCOUNTER — OFFICE VISIT (OUTPATIENT)
Dept: CARDIOLOGY | Facility: CLINIC | Age: 63
End: 2023-03-06
Payer: MEDICARE

## 2023-03-06 VITALS
WEIGHT: 144 LBS | HEART RATE: 89 BPM | SYSTOLIC BLOOD PRESSURE: 132 MMHG | HEIGHT: 67 IN | OXYGEN SATURATION: 90 % | BODY MASS INDEX: 22.6 KG/M2 | DIASTOLIC BLOOD PRESSURE: 78 MMHG

## 2023-03-06 DIAGNOSIS — I44.2 COMPLETE HEART BLOCK: ICD-10-CM

## 2023-03-06 DIAGNOSIS — Z95.818 PRESENCE OF WATCHMAN LEFT ATRIAL APPENDAGE CLOSURE DEVICE: ICD-10-CM

## 2023-03-06 DIAGNOSIS — Z95.0 CARDIAC PACEMAKER IN SITU: ICD-10-CM

## 2023-03-06 DIAGNOSIS — I48.21 PERMANENT ATRIAL FIBRILLATION: Primary | ICD-10-CM

## 2023-03-06 PROBLEM — J44.1 COPD WITH ACUTE EXACERBATION: Status: RESOLVED | Noted: 2019-01-30 | Resolved: 2023-03-06

## 2023-03-06 PROCEDURE — 99213 OFFICE O/P EST LOW 20 MIN: CPT | Performed by: PHYSICIAN ASSISTANT

## 2023-03-06 PROCEDURE — 93279 PRGRMG DEV EVAL PM/LDLS PM: CPT | Performed by: PHYSICIAN ASSISTANT

## 2023-03-06 NOTE — PROGRESS NOTES
Encounter Date:03/06/2023      Patient ID: Nolvia Huff is a 63 y.o. female.    Alfonso Denney MD    Chief Complaint: Atrial Fibrillation and Pacemaker Check      PROBLEM LIST:  Patient Active Problem List    Diagnosis Date Noted   • Permanent atrial fibrillation (HCC) 08/23/2021     Priority: High     Note Last Updated: 3/6/2023       1. Echo 9/21/2020: EF 26 to 30%, left atrial cavity moderately dilated, small PFO with right to left shunting, borderline RV dilation, moderately reduced RV systolic function, moderate TR, RVSP 45 to 55 mmHg.  2. All antiarrhythmic drugs deemed contraindicated due to torsade w/ Tikosyn and structural heart disease with moderate PFO with right to left shunting on IRMA September 2020.  3. Medtronic Micra VR Leadless PM implant 9/30/2020 with AVN ablation  4. S/P Watchman LAAC device implant 2/9/2021  1. Endothelialization noted on prior IRMA in 3/30/2021     • Complete heart block (HCC) 01/04/2021     Priority: High     Note Last Updated: 1/4/2021     AV node ablation     • Presence of Watchman left atrial appendage closure device 08/23/2021     Priority: Medium   • Cardiac pacemaker in situ 01/04/2021     Priority: Medium   • A/C Respiratory Failure Type 2 09/21/2020   • Former smoker 09/21/2020   • Nocturnal hypoxia on 2L NC  09/03/2019   • Pulmonary emphysema (HCC) 09/03/2019     Note Last Updated: 3/6/2023     A.  Moderate to severe obstruction, former tobacco abuse.  B.  Controlled with the azithromycin every Monday Wednesday Friday.     • Other specified anxiety disorders 08/29/2016   • Dyslipidemia 08/29/2016   • Emphysema/COPD (HCC) 08/29/2016     Note Last Updated: 8/29/2016      Emphysema/COPD (492.8)   · A.  Moderate to severe obstruction, former tobacco abuse.B.  Controlled with the      azithromycin every Monday Wednesday Friday.      B. 07/06/15 Spirometry reveals an increase in obstruction, FEV1 now 42% instead of 53%. Minute ventilation reduced. Forced vital  "capacity is reduced suggesting a restrictive pattern.      • GERD  08/29/2016   • Osteoporosis 08/29/2016       History of Present Illness  Patient presents today for follow-up with a history of permanent atrial fibrillation status post AV node ablation with implantation of a leadless permanent pacemaker and a left atrial appendage closure device which is well-placed.  She returns today for scheduled follow-up.  She has rare awareness of a \"twinge\" which is brief and completely random.  She does not check her blood pressure regularly at home.  She admits that she often forgets to take her aspirin.  She is maintaining an active lifestyle    Allergies   Allergen Reactions   • Penicillins Anaphylaxis   • Tikosyn [Dofetilide] Other (See Comments)     Torsades       Current Outpatient Medications   Medication Instructions   • albuterol sulfate  (90 Base) MCG/ACT inhaler 2 puffs, Inhalation, Every 4 Hours PRN   • Aspirin Adult Low Strength 81 mg, Oral, Daily   • doxycycline (VIBRAMYICN) 100 mg, Oral, 2 Times Daily   • Fluticasone Furoate-Vilanterol (Breo Ellipta) 200-25 MCG/INH inhaler 1 puff, Inhalation, Daily   • guaiFENesin (MUCINEX) 600 mg, Oral, 2 Times Daily   • ipratropium-albuterol (DUO-NEB) 0.5-2.5 mg/3 ml nebulizer 3 mL, Nebulization, 4 Times Daily PRN   • montelukast (SINGULAIR) 10 mg, Oral, Nightly   • O2 (OXYGEN) 2 L/min, Inhalation, Nightly   • omeprazole (PRILOSEC) 40 mg, Oral, Daily   • PARoxetine (PAXIL) 10 mg, Oral, Every Morning   • predniSONE (DELTASONE) 10 MG tablet Take 4 tabs daily x 3 days, then take 3 tabs daily x 3 days, then take 2 tabs daily x 3 days, then take 1 tab daily x 3 days   • tiotropium (Spiriva HandiHaler) 18 MCG per inhalation capsule 1 capsule, Inhalation, Daily       .    Objective:     /78 (BP Location: Left arm, Patient Position: Sitting)   Pulse 89   Ht 170.2 cm (67\")   Wt 65.3 kg (144 lb)   LMP  (LMP Unknown) Comment: last mammogram 2020  SpO2 90%   BMI " 22.55 kg/m²    Body mass index is 22.55 kg/m².     Vitals reviewed.   Constitutional:       Appearance: Well-developed.   Pulmonary:      Effort: Pulmonary effort is normal. No respiratory distress.      Breath sounds: Normal breath sounds. No wheezing. No rales.      Comments: Bases clear  Chest:      Chest wall: Not tender to palpatation.   Cardiovascular:      Normal rate. Regular rhythm.      Murmurs: There is no murmur.      No gallop. No click. No rub.   Pulses:     Intact distal pulses.   Musculoskeletal: Normal range of motion.       Lab Review:                 TSH    TSH 9/7/22   TSH 2.140                   Procedures               Assessment:      Diagnosis Plan   1. Permanent atrial fibrillation (HCC)   status post AV node ablation      2. Complete heart block (HCC)   normal functioning leadless permanent pacemaker with greater than 8 years battery life and normal lead parameters      3. Presence of Watchman left atrial appendage closure device   well-placed left atrial appendage closure device.  No indication for anticoagulation      4. Cardiac pacemaker in situ    This patient's Cardiac Implanted Electronic Device was manually interrogated and reprogrammed during the patient encounter today.  Iterative programming changes were manually made to determine the sensing threshold, pacing threshold, lead impedance as well as underlying cardiac rhythm.  These programming changes were not limited to but included some or all of the following when appropriate: pacing mode, programmed AV delays, blanking periods, and refractory periods.  Data obtained as a result of these manual programing changes informed the patient's CIED permanent programming.            Plan:     Stable cardiac status.  Continue current medications.   in 6 months, sooner as needed.  Thank you for allowing us to participate in the care of your patient.     Electronically signed by DENIS Lopez, 03/06/23, 11:25 AM EST.

## 2023-03-15 ENCOUNTER — HOSPITAL ENCOUNTER (OUTPATIENT)
Dept: CT IMAGING | Facility: HOSPITAL | Age: 63
Discharge: HOME | End: 2023-03-15
Admitting: NURSE PRACTITIONER
Payer: MEDICARE

## 2023-03-15 DIAGNOSIS — J43.2 CENTRILOBULAR EMPHYSEMA: ICD-10-CM

## 2023-03-15 PROCEDURE — 71271 CT THORAX LUNG CANCER SCR C-: CPT

## 2023-03-27 ENCOUNTER — OFFICE VISIT (OUTPATIENT)
Dept: PULMONOLOGY | Facility: CLINIC | Age: 63
End: 2023-03-27
Payer: COMMERCIAL

## 2023-03-27 VITALS
BODY MASS INDEX: 22.95 KG/M2 | HEART RATE: 82 BPM | SYSTOLIC BLOOD PRESSURE: 124 MMHG | HEIGHT: 67 IN | DIASTOLIC BLOOD PRESSURE: 82 MMHG | WEIGHT: 146.2 LBS | OXYGEN SATURATION: 93 % | TEMPERATURE: 96.2 F

## 2023-03-27 DIAGNOSIS — J96.22 ACUTE ON CHRONIC RESPIRATORY FAILURE WITH HYPOXIA AND HYPERCAPNIA: ICD-10-CM

## 2023-03-27 DIAGNOSIS — Z87.891 PERSONAL HISTORY OF SMOKING: ICD-10-CM

## 2023-03-27 DIAGNOSIS — J96.21 ACUTE ON CHRONIC RESPIRATORY FAILURE WITH HYPOXIA AND HYPERCAPNIA: ICD-10-CM

## 2023-03-27 DIAGNOSIS — J43.2 CENTRILOBULAR EMPHYSEMA: Primary | ICD-10-CM

## 2023-03-27 DIAGNOSIS — J44.1 COPD WITH ACUTE EXACERBATION: ICD-10-CM

## 2023-03-27 DIAGNOSIS — K21.9 GASTROESOPHAGEAL REFLUX DISEASE WITHOUT ESOPHAGITIS: ICD-10-CM

## 2023-03-27 DIAGNOSIS — G47.34 NOCTURNAL HYPOXIA: ICD-10-CM

## 2023-03-27 DIAGNOSIS — J43.9 PULMONARY EMPHYSEMA, UNSPECIFIED EMPHYSEMA TYPE: ICD-10-CM

## 2023-03-27 PROCEDURE — 94618 PULMONARY STRESS TESTING: CPT | Performed by: NURSE PRACTITIONER

## 2023-03-27 PROCEDURE — 1160F RVW MEDS BY RX/DR IN RCRD: CPT | Performed by: NURSE PRACTITIONER

## 2023-03-27 PROCEDURE — 94726 PLETHYSMOGRAPHY LUNG VOLUMES: CPT | Performed by: NURSE PRACTITIONER

## 2023-03-27 PROCEDURE — 94375 RESPIRATORY FLOW VOLUME LOOP: CPT | Performed by: NURSE PRACTITIONER

## 2023-03-27 PROCEDURE — 99214 OFFICE O/P EST MOD 30 MIN: CPT | Performed by: NURSE PRACTITIONER

## 2023-03-27 PROCEDURE — 1159F MED LIST DOCD IN RCRD: CPT | Performed by: NURSE PRACTITIONER

## 2023-03-27 PROCEDURE — 94729 DIFFUSING CAPACITY: CPT | Performed by: NURSE PRACTITIONER

## 2023-03-27 RX ORDER — ALBUTEROL SULFATE 90 UG/1
2 AEROSOL, METERED RESPIRATORY (INHALATION) EVERY 4 HOURS PRN
Qty: 6.7 G | Refills: 5 | Status: SHIPPED | OUTPATIENT
Start: 2023-03-27

## 2023-03-27 RX ORDER — PREDNISONE 10 MG/1
TABLET ORAL
Qty: 31 TABLET | Refills: 1 | Status: SHIPPED | OUTPATIENT
Start: 2023-03-27

## 2023-03-27 NOTE — PROGRESS NOTES
[facility]    CHIEF COMPLAINT    Dyspnea with exertion    HISTORY OF PRESENT ILLNESS    Nolvia Huff is a 63 y.o.female here today for follow-up.  She was last seen in the office by me in September.  She denies any respiratory illnesses since her last appointment.  She does have worsening sinus congestion recently.  I did send in an antibiotic and steroid for her back in December for a COPD exacerbation.    She continues to use her Breo and Spiriva daily.  She also has her nebulizers to use as needed.  She does continue to have shortness of breath with exertion but does recover very quickly at rest.    She has oxygen to use during the day but does not wear it much during the day as she has been very busy.  She would like a portable concentrator but unfortunately she has not received 1.  She does continue to wear her oxygen at nighttime.  She denies any sleeping difficulties.    She denies any sputum production or hemoptysis.  She does have more increased sinus drainage.  She denies chest pain or palpitations.  She denies any lower extremity edema or calf tenderness.    She quit smoking in 2004 and has a 60-pack-year history of    Patient Active Problem List   Diagnosis   • Personal history of smoking   • Other specified anxiety disorders   • Dyslipidemia   • Emphysema/COPD (HCC)   • GERD    • Osteoporosis   • Nocturnal hypoxia on 2L NC    • Pulmonary emphysema (HCC)   • A/C Respiratory Failure Type 2   • Former smoker   • Complete heart block (HCC)   • Cardiac pacemaker in situ   • Permanent atrial fibrillation (HCC)   • Presence of Watchman left atrial appendage closure device       Allergies   Allergen Reactions   • Penicillins Anaphylaxis   • Tikosyn [Dofetilide] Other (See Comments)     Torsades       Current Outpatient Medications:   •  albuterol sulfate  (90 Base) MCG/ACT inhaler, Inhale 2 puffs Every 4 (Four) Hours As Needed for Wheezing., Disp: 6.7 g, Rfl: 5  •  Aspirin Adult Low  Strength 81 MG EC tablet, Take 1 tablet by mouth Daily., Disp: , Rfl:   •  Fluticasone Furoate-Vilanterol (Breo Ellipta) 200-25 MCG/INH inhaler, Inhale 1 puff Daily., Disp: 60 each, Rfl: 11  •  guaiFENesin (Mucinex) 600 MG 12 hr tablet, Take 1 tablet by mouth 2 (Two) Times a Day., Disp: 120 tablet, Rfl: 6  •  ipratropium-albuterol (DUO-NEB) 0.5-2.5 mg/3 ml nebulizer, Take 3 mL by nebulization 4 (Four) Times a Day As Needed for Wheezing., Disp: 360 mL, Rfl: 3  •  montelukast (SINGULAIR) 10 MG tablet, Take 1 tablet by mouth Every Night., Disp: 90 tablet, Rfl: 3  •  O2 (OXYGEN), Inhale 2 L/min Every Night., Disp: , Rfl:   •  omeprazole (priLOSEC) 40 MG capsule, Take 1 capsule by mouth Daily. (Patient taking differently: Take 1 capsule by mouth As Needed.), Disp: 30 capsule, Rfl: 11  •  PARoxetine (Paxil) 10 MG tablet, Take 1 tablet by mouth Every Morning., Disp: 90 tablet, Rfl: 3  •  predniSONE (DELTASONE) 10 MG tablet, Take 4 tabs daily x 3 days, then take 3 tabs daily x 3 days, then take 2 tabs daily x 3 days, then take 1 tab daily x 3 days, Disp: 31 tablet, Rfl: 1  •  tiotropium (Spiriva HandiHaler) 18 MCG per inhalation capsule, Place 1 capsule into inhaler and inhale Daily., Disp: 90 capsule, Rfl: 3  MEDICATION LIST AND ALLERGIES REVIEWED.    Social History     Tobacco Use   • Smoking status: Former     Packs/day: 2.00     Years: 30.00     Pack years: 60.00     Types: Cigarettes     Quit date:      Years since quittin.2   • Smokeless tobacco: Never   • Tobacco comments:     fomer smoker 2 ppd x 30 years-Still smoke 1 cig, sporadically   Vaping Use   • Vaping Use: Never used   Substance Use Topics   • Alcohol use: Not Currently   • Drug use: Yes     Types: Marijuana     Comment: occasionally smoked marijuana       FAMILY AND SOCIAL HISTORY REVIEWED.    Review of Systems   Constitutional: Negative for activity change, appetite change, fatigue, fever and unexpected weight change.   HENT: Positive for  "congestion, postnasal drip and rhinorrhea. Negative for sinus pressure, sore throat and voice change.    Eyes: Negative for visual disturbance.   Respiratory: Positive for cough and shortness of breath. Negative for chest tightness and wheezing.    Cardiovascular: Negative for chest pain, palpitations and leg swelling.   Gastrointestinal: Negative for abdominal distention, abdominal pain, nausea and vomiting.   Endocrine: Negative for cold intolerance and heat intolerance.   Genitourinary: Negative for difficulty urinating and urgency.   Musculoskeletal: Negative for arthralgias, back pain and neck pain.   Skin: Negative for color change and pallor.   Allergic/Immunologic: Negative for environmental allergies and food allergies.   Neurological: Negative for dizziness, syncope, weakness and light-headedness.   Hematological: Negative for adenopathy. Does not bruise/bleed easily.   Psychiatric/Behavioral: Negative for agitation and behavioral problems.   .    /82 (BP Location: Left arm, Patient Position: Sitting, Cuff Size: Adult)   Pulse 82   Temp 96.2 °F (35.7 °C) (Infrared)   Ht 170.2 cm (67.01\")   Wt 66.3 kg (146 lb 3.2 oz)   LMP  (LMP Unknown) Comment: last mammogram 2020  SpO2 93% Comment: resting room air  BMI 22.89 kg/m²     Immunization History   Administered Date(s) Administered   • COVID-19 (PFIZER) PURPLE CAP 07/18/2021, 08/10/2021   • Flu Vaccine Quad PF >36MO 10/04/2016, 01/30/2019   • FluLaval/Fluzone >6mos 10/22/2020   • Influenza TIV (IM) 10/20/2008   • Influenza, Unspecified 10/04/2016   • Pneumococcal Conjugate 20-Valent (PCV20) 09/07/2022   • Pneumococcal Polysaccharide (PPSV23) 10/22/2020   • Tdap 06/29/2010, 06/29/2010       Physical Exam  Vitals and nursing note reviewed.   Constitutional:       Appearance: She is well-developed. She is not diaphoretic.   HENT:      Head: Normocephalic and atraumatic.   Eyes:      Pupils: Pupils are equal, round, and reactive to light.   Neck:      " Thyroid: No thyromegaly.   Cardiovascular:      Rate and Rhythm: Normal rate and regular rhythm.      Heart sounds: Normal heart sounds. No murmur heard.    No friction rub. No gallop.   Pulmonary:      Effort: Pulmonary effort is normal. No respiratory distress.      Breath sounds: Normal breath sounds. No wheezing or rales.   Chest:      Chest wall: No tenderness.   Abdominal:      General: Bowel sounds are normal.      Palpations: Abdomen is soft.      Tenderness: There is no abdominal tenderness.   Musculoskeletal:         General: No swelling. Normal range of motion.      Cervical back: Normal range of motion and neck supple.   Lymphadenopathy:      Cervical: No cervical adenopathy.   Skin:     General: Skin is warm and dry.      Capillary Refill: Capillary refill takes less than 2 seconds.   Neurological:      Mental Status: She is alert and oriented to person, place, and time.   Psychiatric:         Mood and Affect: Mood normal.         Behavior: Behavior normal.           RESULTS    PFTS in the office today, read by me, FVC 2.00 58% predicted, FEV1 0.88 33% predicted, FEV1/FVC 44% predicted, TLC 3.17 60% predicted, DLCO 52% predicted, severe obstruction, moderate restriction and reduced DLCO.      6-minute walk test: Patient ambulated 800 feet and desaturated below 87%, she was started on 2 L pulsed dose and then increased to 3 L pulsed dose at the end of walking, she does meet qualifications for oxygen with activity, percent of predicted was 49%.    CT Chest Low Dose Cancer Screening WO    Result Date: 3/15/2023  Impression: 1.No significant interval change since prior exam. 2.Cystic like area with adjacent parenchymal density right lower lobe which may relate to a pneumatocele which has been noted and may reflect sequela to prior inflammatory/infectious process. 3.Atelectatic changes in the right middle lobe and lingula. 4.Emphysematous changes. Recommendation: Annual low dose screening CT Lung Rads  Assessment: Lung-RADS L1 - Negative, <1% chance of malignancy. Electronically Signed: Hayden Alicea  3/15/2023 6:51 PM EDT  Workstation ID: [id number]    PROBLEM LIST    Problem List Items Addressed This Visit        Gastrointestinal Abdominal     GERD        Pulmonary and Pneumonias    Pulmonary emphysema (HCC) - Primary    Overview     A.  Moderate to severe obstruction, former tobacco abuse.  B.  Controlled with the azithromycin every Monday Wednesday Friday.         Relevant Medications    predniSONE (DELTASONE) 10 MG tablet    albuterol sulfate  (90 Base) MCG/ACT inhaler    Other Relevant Orders    Pulmonary Function Test (Completed)    6 Minute Walk Test (Completed)    A/C Respiratory Failure Type 2       Sleep    Nocturnal hypoxia on 2L NC        Tobacco    Personal history of smoking    Overview     Has smoked cigarettes within prior year (V15.82)   · A.  The patient is a former smoker of 2 packs of cigarettes a day for 30 years prior to      quitting in 2004.   Pt will smoke 1 cigarette sporadically 07/06/15         Relevant Orders    CT chest low dose wo   Other Visit Diagnoses     COPD with acute exacerbation        Relevant Medications    predniSONE (DELTASONE) 10 MG tablet    albuterol sulfate  (90 Base) MCG/ACT inhaler            DISCUSSION    Ms. Huff was here for follow-up.  She doing fairly well from a pulmonary standpoint.  She was requesting a prednisone taper to have to help with her congestion.  I did send this in for her and gave her a refill.    We did review her full PFTs in the office today and she continues to have a severe obstruction.  She will continue her Breo and Spiriva daily.  I did encourage her to use the albuterol as needed for shortness of breath.    She will continue to take over-the-counter medication as needed for GERD.  We also discussed reflux precautions in the office today.    We reviewed her CT scan that was performed earlier this month that shows no nodule  or mass concerning for malignancy.  She does qualify for a yearly CT scan.    She will continue to wear her oxygen at 3 L pulsed dose with activity and at nighttime.  I did advise her to wear this with activity.  I am going to order her a portable concentrator to use as needed when she is outside of the home.    She will follow-up in 6 months or sooner if her symptoms worsen.  Advised her to call with any additional concerns or questions.    I personally spent a total of 31 minutes on patient visit today including chart review, face to face with the patient obtaining the history and physical exam, review of pertinent images and tests, counseling and discussion and/or coordination of care as described above, and documentation.  Total time excludes time spent on other separate services such as performing procedures or test interpretation, if applicable.        Maryellen Higuera, APRN  03/27/202312:18 EDT  Electronically signed     Please note that portions of this note were completed with a voice recognition program.        CC: Alfonso Denney MD

## 2023-04-13 ENCOUNTER — TELEPHONE (OUTPATIENT)
Dept: PULMONOLOGY | Facility: CLINIC | Age: 63
End: 2023-04-13
Payer: MEDICARE

## 2023-05-07 PROCEDURE — 93294 REM INTERROG EVL PM/LDLS PM: CPT | Performed by: INTERNAL MEDICINE

## 2023-05-07 PROCEDURE — 93296 REM INTERROG EVL PM/IDS: CPT | Performed by: INTERNAL MEDICINE

## 2023-07-26 ENCOUNTER — TELEPHONE (OUTPATIENT)
Dept: CARDIOLOGY | Facility: CLINIC | Age: 63
End: 2023-07-26
Payer: MEDICARE

## 2023-07-26 NOTE — TELEPHONE ENCOUNTER
Mrs Huff called with concerns of monitor not staying charged.  Gave her 800# to call to troubleshoot monitor.

## 2023-08-06 PROCEDURE — 93294 REM INTERROG EVL PM/LDLS PM: CPT | Performed by: INTERNAL MEDICINE

## 2023-08-06 PROCEDURE — 93296 REM INTERROG EVL PM/IDS: CPT | Performed by: INTERNAL MEDICINE

## 2023-09-18 ENCOUNTER — OFFICE VISIT (OUTPATIENT)
Dept: CARDIOLOGY | Facility: CLINIC | Age: 63
End: 2023-09-18
Payer: MEDICARE

## 2023-09-18 VITALS
SYSTOLIC BLOOD PRESSURE: 100 MMHG | DIASTOLIC BLOOD PRESSURE: 58 MMHG | OXYGEN SATURATION: 91 % | HEART RATE: 65 BPM | WEIGHT: 144.8 LBS | HEIGHT: 67 IN | BODY MASS INDEX: 22.73 KG/M2

## 2023-09-18 DIAGNOSIS — Z95.818 PRESENCE OF WATCHMAN LEFT ATRIAL APPENDAGE CLOSURE DEVICE: ICD-10-CM

## 2023-09-18 DIAGNOSIS — I48.21 PERMANENT ATRIAL FIBRILLATION: ICD-10-CM

## 2023-09-18 DIAGNOSIS — Z95.0 CARDIAC PACEMAKER IN SITU: ICD-10-CM

## 2023-09-18 DIAGNOSIS — J44.1 COPD WITH ACUTE EXACERBATION: ICD-10-CM

## 2023-09-18 DIAGNOSIS — I44.2 COMPLETE HEART BLOCK: Primary | ICD-10-CM

## 2023-09-18 PROCEDURE — 93279 PRGRMG DEV EVAL PM/LDLS PM: CPT | Performed by: INTERNAL MEDICINE

## 2023-09-18 PROCEDURE — 99214 OFFICE O/P EST MOD 30 MIN: CPT | Performed by: INTERNAL MEDICINE

## 2023-09-18 RX ORDER — PREDNISONE 10 MG/1
TABLET ORAL
Qty: 31 TABLET | Refills: 1 | Status: SHIPPED | OUTPATIENT
Start: 2023-09-18

## 2023-09-18 NOTE — PROGRESS NOTES
Cardiac Electrophysiology Outpatient Follow Up Note            [facility]    Follow Up Office Visit      Nolvia Huff  [id number]  09/18/2023  [unfilled]  [unfilled]    Primary Care Physician: Alfonso Denney MD    Referred By: No ref. provider found    Subjective     Chief Complaint:   Diagnoses and all orders for this visit:    1. Complete heart block (Primary)    2. Permanent atrial fibrillation    3. Cardiac pacemaker in situ    4. Presence of Watchman left atrial appendage closure device      Chief Complaint   Patient presents with    Permanent atrial fibrillation       History of Present Illness:   Nolvia Huff is a 63 y.o. female who presents to my electrophysiology clinic for follow up of shortness of breath.  Difficulty breathing.  The weather the summer was difficult.  She has used her steroid taper multiple occasions with significant improvement in symptoms.  No orthopnea no paroxysmal nocturnal dyspnea..      Past Medical History:   Past Medical History:   Diagnosis Date    Acute otitis media     H/o    Atrial fibrillation     COPD (chronic obstructive pulmonary disease)     inhalers prn     Dependence on nocturnal oxygen therapy     2-3 liters at night and daytime use during summer months     GERD (gastroesophageal reflux disease)     H/O chest x-ray 10/04/2011    Cardiac silhouute normal limits. The pulmonary vasculature appears normal, The diaphragms are flattened bilaterally which is sug of COPD. Lungs are grossly clear bilaterally with no prominent nodules or masses seen. No change when compared to previous exam from 11/10/09    H/O chest x-ray 05/31/2011    Chronic change. No active disease    H/O chest x-ray 11/10/2009    Moderate degenerative changes of thoracic spine. Cardiac silhoutte appears normal. There is slight hyperexpansion. There were a few scattered calcified granulomas. No acute appearing infiltrates noted. no real  change compared to 10/20/08    History of PFTs 2015    Severe OAD w/ dec in FEV from prior. no resp to BDRX. MVV reduced. FVC reduced; sugg restricitve pattern    History of PFTs 10/21/2013    Mod severe OAD, FVC normal, no restriction, MVV reduced    History of PFTs 2012    Severe OAD, FVC reduced, MVV dec    Normal stillborn     Pt had stillborn infant    Pacemaker        Past Surgical History:   Past Surgical History:   Procedure Laterality Date    ATRIAL APPENDAGE EXCLUSION LEFT WITH TRANSESOPHAGEAL ECHOCARDIOGRAM N/A 2021    Procedure: Atrial Appendage Occlusion (Watchman), on Eliquis hold 2 doses;  Surgeon: Maximus Solomon DO;  Location:  [facility];  Service: Cardiology;  Laterality: N/A;    BREAST CYST ASPIRATION Right     CARDIAC ELECTROPHYSIOLOGY PROCEDURE N/A 2020    Procedure: MICRA PACEMAKER IMPLANTATION + AVN;  Surgeon: Maximus Solomon DO;  Location: [facility];  Service: Cardiology;  Laterality: N/A;    COLONOSCOPY      HYSTERECTOMY      TONSILLECTOMY         Family History:   Family History   Problem Relation Age of Onset    Emphysema Mother     Cancer Mother         laryngeal carcinoma    Heart murmur Father     Cerebral palsy Sister     Breast cancer Sister     Asthma Brother     Heart murmur Brother     Heart disease Maternal Grandmother     Cancer Maternal Grandfather         laryngeal carcinoma.    No Known Problems Paternal Grandmother     No Known Problems Paternal Grandfather     No Known Problems Son     Other Son          at birth       Social History:   Social History     Socioeconomic History    Marital status:    Tobacco Use    Smoking status: Former     Packs/day: 2.00     Years: 30.00     Pack years: 60.00     Types: Cigarettes     Quit date:      Years since quittin.7    Smokeless tobacco: Never    Tobacco comments:     fomer smoker 2 ppd x 30 years-Still smoke 1 cig, sporadically   Vaping Use    Vaping  Use: Never used   Substance and Sexual Activity    Alcohol use: Not Currently    Drug use: Yes     Types: Marijuana     Comment: occasionally smoked marijuana    Sexual activity: Defer       Medications:     Current Outpatient Medications:     albuterol sulfate  (90 Base) MCG/ACT inhaler, Inhale 2 puffs Every 4 (Four) Hours As Needed for Wheezing., Disp: 6.7 g, Rfl: 5    Fluticasone Furoate-Vilanterol (Breo Ellipta) 200-25 MCG/INH inhaler, Inhale 1 puff Daily., Disp: 60 each, Rfl: 11    guaiFENesin (Mucinex) 600 MG 12 hr tablet, Take 1 tablet by mouth 2 (Two) Times a Day., Disp: 120 tablet, Rfl: 6    ipratropium-albuterol (DUO-NEB) 0.5-2.5 mg/3 ml nebulizer, Take 3 mL by nebulization 4 (Four) Times a Day As Needed for Wheezing., Disp: 360 mL, Rfl: 3    montelukast (SINGULAIR) 10 MG tablet, Take 1 tablet by mouth Every Night., Disp: 90 tablet, Rfl: 3    O2 (OXYGEN), Inhale 2 L/min Every Night., Disp: , Rfl:     omeprazole (priLOSEC) 40 MG capsule, Take 1 capsule by mouth Daily. (Patient taking differently: Take 1 capsule by mouth As Needed.), Disp: 30 capsule, Rfl: 11    PARoxetine (Paxil) 10 MG tablet, Take 1 tablet by mouth Every Morning., Disp: 90 tablet, Rfl: 3    predniSONE (DELTASONE) 10 MG tablet, Take 4 tabs daily x 3 days, then take 3 tabs daily x 3 days, then take 2 tabs daily x 3 days, then take 1 tab daily x 3 days (Patient taking differently: As Needed. Take 4 tabs daily x 3 days, then take 3 tabs daily x 3 days, then take 2 tabs daily x 3 days, then take 1 tab daily x 3 days), Disp: 31 tablet, Rfl: 1    tiotropium (Spiriva HandiHaler) 18 MCG per inhalation capsule, Place 1 capsule into inhaler and inhale Daily., Disp: 90 capsule, Rfl: 3    Aspirin Adult Low Strength 81 MG EC tablet, Take 1 tablet by mouth Daily. (Patient not taking: Reported on 9/18/2023), Disp: , Rfl:     Allergies:   Allergies   Allergen Reactions    Penicillins Anaphylaxis    Tikosyn [Dofetilide] Other (See Comments)      "Torsades       Objective   Vital Signs:   Vitals:    09/18/23 1030   BP: 100/58   BP Location: Left arm   Patient Position: Sitting   Pulse: 65   SpO2: 91%   Weight: 65.7 kg (144 lb 12.8 oz)   Height: 170.2 cm (67\")       PHYSICAL EXAM  General appearance: Awake, alert, cooperative  Head: Normocephalic, without obvious abnormality, atraumatic  Eyes: Conjunctivae/corneas clear, EOMs intact  Neck: no adenopathy, no carotid bruit, no JVD, and thyroid: not enlarged  Lungs:  Significantly prolonged expiratory phase.  No accessory muscles of respiration used at rest.  Significant bilateral wheezing  Heart: regular rate and rhythm, S1, S2 normal, no murmur, click, rub or gallop  Abdomen: Soft, non-tender, bowel sounds normal,  no organomegaly  Extremities: extremities normal, atraumatic, no cyanosis or edema  Skin: Skin color, turgor normal, no rashes or lesions  Neurologic: Grossly normal     Lab Results   Component Value Date    GLUCOSE 85 09/07/2022    CALCIUM 9.5 09/07/2022     09/07/2022    K 4.4 09/07/2022    CO2 34.0 (H) 09/07/2022     09/07/2022    BUN 13 09/07/2022    CREATININE 0.65 09/07/2022    EGFRIFNONA 87 02/05/2021    BCR 20.0 09/07/2022    ANIONGAP 8.0 09/07/2022     Lab Results   Component Value Date    WBC 8.49 09/07/2022    HGB 16.1 (H) 09/07/2022    HCT 46.8 (H) 09/07/2022    MCV 89.3 09/07/2022     09/07/2022     Lab Results   Component Value Date    INR 1.14 09/21/2020    PROTIME 14.3 (H) 09/21/2020     Lab Results   Component Value Date    TSH 2.140 09/07/2022       Cardiac Testing:     I personally viewed and interpreted the patient's EKG/Telemetry/lab data    Procedures    Tobacco Cessation: N/A Cessation Counseling Provided   Obstructive Sleep Apnea Screening: Completed    Advance Care Planning   ACP discussion was declined by the patient. Patient does not have an advance directive, declines further assistance.       Assessment & Plan    Diagnoses and all orders for this " visit:    1. Complete heart block (Primary)    2. Permanent atrial fibrillation    3. Cardiac pacemaker in situ    4. Presence of Watchman left atrial appendage closure device         Diagnosis Plan   1. Complete heart block  Completely pacemaker dependent.    Underlying rhythm is complete heart block with permanent A-fib.      2. Permanent atrial fibrillation        3. Cardiac pacemaker in situ  Medtronic leadless cardiac pacemaker.  Micra VR.    Appropriate pacing sensing and impedance values.      This patient's Cardiac Implanted Electronic Device was manually interrogated and reprogrammed during the patient encounter today.  Iterative programming changes were manually made to determine the sensing threshold, pacing threshold, lead impedance as well as underlying cardiac rhythm.  These programming changes were not limited to but included some or all of the following when appropriate: pacing mode, programmed AV delays, blanking periods, and refractory periods.  Data obtained as a result of these manual programing changes informed the patient's CIED permanent programming.        4. Presence of Watchman left atrial appendage closure device  Aspirin monotherapy.   COPD.  Significant wheezing today.  Hypoxemia.  Has an appoint with her pulmonologist on Monday.  I will refill her prednisone steroid taper which she will go and fill now.  By the end of our visit today she was breathing well.     Body mass index is 22.68 kg/m².    I spent 37 minutes in consultation with this patient which included more than 65% of this time in direct face-to-face counseling, physical examination and discussion of my assessment and findings and this shared decision making with the patient.  The remainder of the time not spent face-to-face was performing one, some or all of the following actions: preparing to see the patient (e.g. reviewing tests, prior clinicians' notes, etc), ordering medications, tests or procedures, coordination of  care, discussion of the plan with other healthcare providers, documenting clinical information in epic as well as independently interpreting results and communication of these results to the patient family and/or caregiver(s).  Please note that this explicitly excludes time spent on other separate billable services such as performing procedures or test interpretation, when applicable.      Follow Up:       Thank you for allowing me to participate in the care of your patient. Please to not hesitate to contact me with additional questions or concerns.      Maximus Solomon, DO, FACC, RS  Cardiac Electrophysiologist  [facility]

## 2023-09-25 ENCOUNTER — OFFICE VISIT (OUTPATIENT)
Dept: PULMONOLOGY | Facility: CLINIC | Age: 63
End: 2023-09-25

## 2023-09-25 VITALS
WEIGHT: 146.8 LBS | HEART RATE: 88 BPM | BODY MASS INDEX: 23.04 KG/M2 | TEMPERATURE: 96.8 F | DIASTOLIC BLOOD PRESSURE: 70 MMHG | SYSTOLIC BLOOD PRESSURE: 122 MMHG | OXYGEN SATURATION: 94 % | HEIGHT: 67 IN

## 2023-09-25 DIAGNOSIS — J43.2 CENTRILOBULAR EMPHYSEMA: Primary | ICD-10-CM

## 2023-09-25 DIAGNOSIS — K21.9 GASTROESOPHAGEAL REFLUX DISEASE WITHOUT ESOPHAGITIS: ICD-10-CM

## 2023-09-25 DIAGNOSIS — J96.22 ACUTE ON CHRONIC RESPIRATORY FAILURE WITH HYPOXIA AND HYPERCAPNIA: ICD-10-CM

## 2023-09-25 DIAGNOSIS — J96.21 ACUTE ON CHRONIC RESPIRATORY FAILURE WITH HYPOXIA AND HYPERCAPNIA: ICD-10-CM

## 2023-09-25 DIAGNOSIS — Z87.891 PERSONAL HISTORY OF SMOKING: ICD-10-CM

## 2023-09-25 PROCEDURE — 1160F RVW MEDS BY RX/DR IN RCRD: CPT | Performed by: NURSE PRACTITIONER

## 2023-09-25 PROCEDURE — 99214 OFFICE O/P EST MOD 30 MIN: CPT | Performed by: NURSE PRACTITIONER

## 2023-09-25 PROCEDURE — 1159F MED LIST DOCD IN RCRD: CPT | Performed by: NURSE PRACTITIONER

## 2023-09-25 RX ORDER — AZITHROMYCIN 250 MG/1
250 TABLET, FILM COATED ORAL DAILY
Qty: 90 TABLET | Refills: 3 | Status: SHIPPED | OUTPATIENT
Start: 2023-09-25

## 2023-09-25 RX ORDER — PREDNISONE 10 MG/1
TABLET ORAL
Qty: 31 TABLET | Refills: 1 | Status: SHIPPED | OUTPATIENT
Start: 2023-09-25

## 2023-09-25 NOTE — PROGRESS NOTES
[facility] Pulmonary Follow up    CHIEF COMPLAINT    Dyspnea    HISTORY OF PRESENT ILLNESS    Nolvia Huff is a 63 y.o.female here today for follow-up.  She was last seen in the office by me in March.  She states that she has had a rough summer with her COPD.  She states the humidity does cause her to be more short of breath.    She continues to use her Breo and Spiriva daily.  She would like to discuss if there is any other new inhalers that she could try to help with her breathing.    She states that she stays active during the day and has been wearing her oxygen some in between doing activity.  She did receive a POC since her last appointment and brings it with her but does not like wearing it with activity due to it being heavy.    She uses 2 L at home when she is on her oxygen.  She does sleep with her oxygen at night.  She is sleeping well.    She denies any sputum production currently.  She denies any hemoptysis.  She does have occasional chest pain and denies palpitations.  She has followed with cardiology and was started steroid taper for wheezing.  It was felt that her chest pain was related to her breathing.  She denies any lower extremity edema or calf tenderness.    She takes Pepcid occasionally for reflux.  She does not have symptoms on a regular basis.    She quit smoking in 2004 and has a 60-pack-year history.  She does qualify for yearly CT screenings and her next CT scan will be due in March 2024.    Patient Active Problem List   Diagnosis    Personal history of smoking    Other specified anxiety disorders    Dyslipidemia    Emphysema/COPD    GERD     Osteoporosis    Nocturnal hypoxia on 2L NC     Pulmonary emphysema    A/C Respiratory Failure Type 2    Former smoker    Complete heart block    Cardiac pacemaker in situ    Permanent atrial fibrillation    Presence of Watchman left atrial appendage closure device       Allergies   Allergen Reactions    Penicillins Anaphylaxis    Tikosyn  [Dofetilide] Other (See Comments)     Torsades       Current Outpatient Medications:     albuterol sulfate  (90 Base) MCG/ACT inhaler, Inhale 2 puffs Every 4 (Four) Hours As Needed for Wheezing., Disp: 6.7 g, Rfl: 5    Aspirin Adult Low Strength 81 MG EC tablet, Take 1 tablet by mouth Daily., Disp: , Rfl:     Fluticasone Furoate-Vilanterol (Breo Ellipta) 200-25 MCG/INH inhaler, Inhale 1 puff Daily., Disp: 60 each, Rfl: 11    guaiFENesin (Mucinex) 600 MG 12 hr tablet, Take 1 tablet by mouth 2 (Two) Times a Day., Disp: 120 tablet, Rfl: 6    ipratropium-albuterol (DUO-NEB) 0.5-2.5 mg/3 ml nebulizer, Take 3 mL by nebulization 4 (Four) Times a Day As Needed for Wheezing., Disp: 360 mL, Rfl: 3    montelukast (SINGULAIR) 10 MG tablet, Take 1 tablet by mouth Every Night., Disp: 90 tablet, Rfl: 3    O2 (OXYGEN), Inhale 2 L/min Every Night., Disp: , Rfl:     omeprazole (priLOSEC) 40 MG capsule, Take 1 capsule by mouth Daily. (Patient taking differently: Take 1 capsule by mouth As Needed.), Disp: 30 capsule, Rfl: 11    PARoxetine (Paxil) 10 MG tablet, Take 1 tablet by mouth Every Morning., Disp: 90 tablet, Rfl: 3    predniSONE (DELTASONE) 10 MG tablet, Take 4 tabs daily x 3 days, then take 3 tabs daily x 3 days, then take 2 tabs daily x 3 days, then take 1 tab daily x 3 days, Disp: 31 tablet, Rfl: 1    tiotropium (Spiriva HandiHaler) 18 MCG per inhalation capsule, Place 1 capsule into inhaler and inhale Daily., Disp: 90 capsule, Rfl: 3    azithromycin (Zithromax) 250 MG tablet, Take 1 tablet by mouth Daily., Disp: 90 tablet, Rfl: 3    predniSONE (DELTASONE) 10 MG tablet, Take 4 tabs daily x 3 days, then take 3 tabs daily x 3 days, then take 2 tabs daily x 3 days, then take 1 tab daily x 3 days, Disp: 31 tablet, Rfl: 1  MEDICATION LIST AND ALLERGIES REVIEWED.    Social History     Tobacco Use    Smoking status: Former     Packs/day: 2.00     Years: 30.00     Pack years: 60.00     Types: Cigarettes     Quit date: 2004      "Years since quittin.7    Smokeless tobacco: Never    Tobacco comments:     fomer smoker 2 ppd x 30 years-Still smoke 1 cig, sporadically   Vaping Use    Vaping Use: Never used   Substance Use Topics    Alcohol use: Not Currently    Drug use: Yes     Types: Marijuana     Comment: occasionally smoked marijuana       FAMILY AND SOCIAL HISTORY REVIEWED.    Review of Systems   Constitutional:  Positive for fatigue. Negative for activity change, appetite change, fever and unexpected weight change.   HENT:  Negative for congestion, postnasal drip, rhinorrhea, sinus pressure, sore throat and voice change.    Eyes:  Negative for visual disturbance.   Respiratory:  Positive for shortness of breath. Negative for cough, chest tightness and wheezing.    Cardiovascular:  Negative for chest pain, palpitations and leg swelling.   Gastrointestinal:  Negative for abdominal distention, abdominal pain, nausea and vomiting.   Endocrine: Negative for cold intolerance and heat intolerance.   Genitourinary:  Negative for difficulty urinating and urgency.   Musculoskeletal:  Negative for arthralgias, back pain and neck pain.   Skin:  Negative for color change and pallor.   Allergic/Immunologic: Negative for environmental allergies and food allergies.   Neurological:  Negative for dizziness, syncope, weakness and light-headedness.   Hematological:  Negative for adenopathy. Does not bruise/bleed easily.   Psychiatric/Behavioral:  Negative for agitation and behavioral problems.  .    /70 (BP Location: Left arm, Patient Position: Sitting, Cuff Size: Adult)   Pulse 88   Temp 96.8 °F (36 °C)   Ht 170.2 cm (67\")   Wt 66.6 kg (146 lb 12.8 oz)   LMP  (LMP Unknown) Comment: last mammogram   SpO2 94% Comment: Room air at rest  BMI 22.99 kg/m²     Immunization History   Administered Date(s) Administered    COVID-19 (PFIZER) Purple Cap Monovalent 2021, 08/10/2021    Flu Vaccine Quad PF >36MO 10/04/2016, 2019    Fluzone " (or Fluarix & Flulaval for VFC) >6mos 10/22/2020    Influenza TIV (IM) 10/20/2008    Influenza, Unspecified 10/04/2016    Pneumococcal Conjugate 20-Valent (PCV20) 09/07/2022    Pneumococcal Polysaccharide (PPSV23) 10/22/2020    Tdap 06/29/2010, 06/29/2010       Physical Exam  Vitals and nursing note reviewed.   Constitutional:       Appearance: She is well-developed. She is not diaphoretic.   HENT:      Head: Normocephalic and atraumatic.   Eyes:      Pupils: Pupils are equal, round, and reactive to light.   Neck:      Thyroid: No thyromegaly.   Cardiovascular:      Rate and Rhythm: Normal rate and regular rhythm.      Heart sounds: Normal heart sounds. No murmur heard.    No friction rub. No gallop.   Pulmonary:      Effort: Pulmonary effort is normal. No respiratory distress.      Breath sounds: Normal breath sounds. No wheezing or rales.   Chest:      Chest wall: No tenderness.   Abdominal:      General: Bowel sounds are normal.      Palpations: Abdomen is soft.      Tenderness: There is no abdominal tenderness.   Musculoskeletal:         General: No swelling. Normal range of motion.      Cervical back: Normal range of motion and neck supple.   Lymphadenopathy:      Cervical: No cervical adenopathy.   Skin:     General: Skin is warm and dry.      Capillary Refill: Capillary refill takes less than 2 seconds.   Neurological:      Mental Status: She is alert and oriented to person, place, and time.   Psychiatric:         Mood and Affect: Mood normal.         Behavior: Behavior normal.         RESULTS      PROBLEM LIST    Problem List Items Addressed This Visit          Gastrointestinal Abdominal     GERD        Pulmonary and Pneumonias    Pulmonary emphysema - Primary    Overview     A.  Moderate to severe obstruction, former tobacco abuse.  B.  Controlled with the azithromycin every Monday Wednesday Friday.         Relevant Medications    predniSONE (DELTASONE) 10 MG tablet    azithromycin (Zithromax) 250 MG tablet     A/C Respiratory Failure Type 2       Tobacco    Personal history of smoking    Overview     Has smoked cigarettes within prior year (V15.82)   · A.  The patient is a former smoker of 2 packs of cigarettes a day for 30 years prior to      quitting in 2004.   Pt will smoke 1 cigarette sporadically 07/06/15              DISCUSSION    Ms. Huff was here for follow-up of her COPD.  I did give her some samples of Breztri to use 2 puffs twice a day instead of her Breo and Spiriva.  If she notices an improvement in her breathing we will continue this inhaler.  She can call me if she needs a prescription sent in.    I want to resume her azithromycin daily.  She has been off of this for several months.    I will send in a prednisone taper for her to have on hand in case she needs it for a COPD exacerbation.    She will continue her omeprazole as needed for reflux.    She is due for a low-dose CT screening in March 2024.  This order has already been placed.    She will follow-up in 6 months.    I personally spent a total of 33 minutes on patient visit today including chart review, face to face with the patient obtaining the history and physical exam, review of pertinent images and tests, counseling and discussion and/or coordination of care as described above, and documentation.  Total time excludes time spent on other separate services such as performing procedures or test interpretation, if applicable.        Maryellen Higuera, HERNAN  09/25/202310:12 EDT  Electronically signed     Please note that portions of this note were completed with a voice recognition program.        CC: Alfonso Denney MD

## 2023-10-06 DIAGNOSIS — J44.1 COPD EXACERBATION: ICD-10-CM

## 2023-10-06 DIAGNOSIS — J43.2 CENTRILOBULAR EMPHYSEMA: ICD-10-CM

## 2023-10-06 RX ORDER — TIOTROPIUM BROMIDE 18 UG/1
CAPSULE ORAL; RESPIRATORY (INHALATION)
Qty: 90 CAPSULE | Refills: 6 | Status: SHIPPED | OUTPATIENT
Start: 2023-10-06

## 2023-10-06 RX ORDER — GUAIFENESIN 600 MG/1
TABLET, EXTENDED RELEASE ORAL
Qty: 120 TABLET | Refills: 6 | Status: SHIPPED | OUTPATIENT
Start: 2023-10-06

## 2023-10-06 RX ORDER — FLUTICASONE FUROATE AND VILANTEROL TRIFENATATE 200; 25 UG/1; UG/1
POWDER RESPIRATORY (INHALATION)
Qty: 60 EACH | Refills: 6 | Status: SHIPPED | OUTPATIENT
Start: 2023-10-06

## 2023-10-17 DIAGNOSIS — J43.2 CENTRILOBULAR EMPHYSEMA: Primary | ICD-10-CM

## 2023-10-25 ENCOUNTER — TELEPHONE (OUTPATIENT)
Dept: CARDIOLOGY | Facility: CLINIC | Age: 63
End: 2023-10-25
Payer: MEDICARE

## 2023-12-08 DIAGNOSIS — J43.9 PULMONARY EMPHYSEMA, UNSPECIFIED EMPHYSEMA TYPE: ICD-10-CM

## 2023-12-08 RX ORDER — ALBUTEROL SULFATE 90 UG/1
2 AEROSOL, METERED RESPIRATORY (INHALATION) EVERY 4 HOURS PRN
Qty: 18 G | Refills: 3 | Status: SHIPPED | OUTPATIENT
Start: 2023-12-08

## 2024-01-24 ENCOUNTER — TELEPHONE (OUTPATIENT)
Dept: CARDIOLOGY | Facility: CLINIC | Age: 64
End: 2024-01-24
Payer: MEDICARE

## 2024-02-04 PROCEDURE — 93296 REM INTERROG EVL PM/IDS: CPT | Performed by: INTERNAL MEDICINE

## 2024-02-04 PROCEDURE — 93294 REM INTERROG EVL PM/LDLS PM: CPT | Performed by: INTERNAL MEDICINE

## 2024-03-07 DIAGNOSIS — J43.2 CENTRILOBULAR EMPHYSEMA: ICD-10-CM

## 2024-03-07 RX ORDER — PREDNISONE 10 MG/1
TABLET ORAL
Qty: 30 TABLET | Refills: 1 | Status: SHIPPED | OUTPATIENT
Start: 2024-03-07

## 2024-04-04 DIAGNOSIS — J43.9 PULMONARY EMPHYSEMA, UNSPECIFIED EMPHYSEMA TYPE: ICD-10-CM

## 2024-04-04 RX ORDER — ALBUTEROL SULFATE 90 UG/1
2 AEROSOL, METERED RESPIRATORY (INHALATION) EVERY 4 HOURS PRN
Qty: 18 G | Refills: 3 | Status: SHIPPED | OUTPATIENT
Start: 2024-04-04

## 2024-04-17 DIAGNOSIS — J43.2 CENTRILOBULAR EMPHYSEMA: Primary | ICD-10-CM

## 2024-05-03 DIAGNOSIS — J43.2 CENTRILOBULAR EMPHYSEMA: ICD-10-CM

## 2024-05-03 RX ORDER — PREDNISONE 10 MG/1
TABLET ORAL
Qty: 30 TABLET | Refills: 1 | OUTPATIENT
Start: 2024-05-03

## 2024-05-08 ENCOUNTER — TELEPHONE (OUTPATIENT)
Dept: CARDIOLOGY | Facility: CLINIC | Age: 64
End: 2024-05-08
Payer: MEDICARE

## 2024-07-01 ENCOUNTER — OFFICE VISIT (OUTPATIENT)
Age: 64
End: 2024-07-01
Payer: MEDICARE

## 2024-07-01 VITALS
DIASTOLIC BLOOD PRESSURE: 72 MMHG | SYSTOLIC BLOOD PRESSURE: 120 MMHG | WEIGHT: 148 LBS | TEMPERATURE: 98.1 F | BODY MASS INDEX: 23.23 KG/M2 | HEIGHT: 67 IN | HEART RATE: 91 BPM | OXYGEN SATURATION: 96 %

## 2024-07-01 DIAGNOSIS — J43.2 CENTRILOBULAR EMPHYSEMA: Primary | ICD-10-CM

## 2024-07-01 DIAGNOSIS — J96.21 ACUTE ON CHRONIC RESPIRATORY FAILURE WITH HYPOXIA AND HYPERCAPNIA: ICD-10-CM

## 2024-07-01 DIAGNOSIS — J96.22 ACUTE ON CHRONIC RESPIRATORY FAILURE WITH HYPOXIA AND HYPERCAPNIA: ICD-10-CM

## 2024-07-01 DIAGNOSIS — K21.9 GASTROESOPHAGEAL REFLUX DISEASE WITHOUT ESOPHAGITIS: ICD-10-CM

## 2024-07-01 DIAGNOSIS — Z87.891 PERSONAL HISTORY OF SMOKING: ICD-10-CM

## 2024-07-01 PROCEDURE — 1159F MED LIST DOCD IN RCRD: CPT | Performed by: NURSE PRACTITIONER

## 2024-07-01 PROCEDURE — 99214 OFFICE O/P EST MOD 30 MIN: CPT | Performed by: NURSE PRACTITIONER

## 2024-07-01 PROCEDURE — 1160F RVW MEDS BY RX/DR IN RCRD: CPT | Performed by: NURSE PRACTITIONER

## 2024-07-01 RX ORDER — TIOTROPIUM BROMIDE 18 UG/1
1 CAPSULE ORAL; RESPIRATORY (INHALATION) DAILY
Qty: 90 CAPSULE | Refills: 6 | Status: SHIPPED | OUTPATIENT
Start: 2024-07-01

## 2024-07-01 RX ORDER — FORMOTEROL FUMARATE DIHYDRATE 20 UG/2ML
20 SOLUTION RESPIRATORY (INHALATION)
Qty: 360 ML | Refills: 3 | Status: SHIPPED | OUTPATIENT
Start: 2024-07-01

## 2024-07-01 RX ORDER — PREDNISONE 10 MG/1
10 TABLET ORAL DAILY
Qty: 45 TABLET | Refills: 1 | Status: SHIPPED | OUTPATIENT
Start: 2024-07-01

## 2024-07-01 RX ORDER — BUDESONIDE 0.5 MG/2ML
0.5 INHALANT ORAL 2 TIMES DAILY
Qty: 360 ML | Refills: 3 | Status: SHIPPED | OUTPATIENT
Start: 2024-07-01

## 2024-07-01 NOTE — PROGRESS NOTES
[facility]    CHIEF COMPLAINT    Dyspnea with exertion    HISTORY OF PRESENT ILLNESS    Nolvia Huff is a 64 y.o.female here today for follow-up.  She was last in the office by me in September.  She has done fairly well over the last 6 months.  She states that the humidity has caused her to have more breathing difficulties.    She is using the Breztri 2 puffs twice a day.  She does feel like this does not work as good as her other inhalers had in the past.  She uses her albuterol occasionally for shortness of breath.    She has her portable oxygen to use but does not use that often due to her machine being too heavy.  She has been wearing it more regularly during the humidity.  She does take her POC with her when she is outside of the home.    She continues to wear oxygen 2 L nasal cannula at nighttime.  She denies any sleeping difficulties.    She is occasionally taking prednisone as needed for her worsening shortness of breath.  She does not take this on a daily basis.    She denies any sputum production or hemoptysis.  She denies any fever, chills or night sweats.    She denies any chest pain or palpitations.  She denies any lower extremity edema or calf tenderness.    She quit smoking 20 years ago and has a 60-pack-year history.    She is wanting a motorized scooter to help with ADLs.    Patient Active Problem List   Diagnosis    Personal history of smoking    Other specified anxiety disorders    Dyslipidemia    Emphysema/COPD    GERD     Osteoporosis    Nocturnal hypoxia on 2L NC     Pulmonary emphysema    A/C Respiratory Failure Type 2    Former smoker    Complete heart block    Cardiac pacemaker in situ    Permanent atrial fibrillation    Presence of Watchman left atrial appendage closure device       Allergies   Allergen Reactions    Penicillins Anaphylaxis    Tikosyn [Dofetilide] Other (See Comments)     Torsades       Current Outpatient Medications:     Aspirin Adult Low Strength 81  MG EC tablet, Take 1 tablet by mouth Daily., Disp: , Rfl:     azithromycin (Zithromax) 250 MG tablet, Take 1 tablet by mouth Daily., Disp: 90 tablet, Rfl: 3    GNP Mucus  MG 12 hr tablet, Take 1 tablet by mouth 2 (Two) Times a Day., Disp: 120 tablet, Rfl: 6    ipratropium-albuterol (DUO-NEB) 0.5-2.5 mg/3 ml nebulizer, Take 3 mL by nebulization 4 (Four) Times a Day As Needed for Wheezing., Disp: 360 mL, Rfl: 3    montelukast (SINGULAIR) 10 MG tablet, Take 1 tablet by mouth Every Night., Disp: 90 tablet, Rfl: 3    O2 (OXYGEN), Inhale 2 L/min Every Night., Disp: , Rfl:     omeprazole (priLOSEC) 40 MG capsule, Take 1 capsule by mouth Daily. (Patient taking differently: Take 1 capsule by mouth As Needed.), Disp: 30 capsule, Rfl: 11    PARoxetine (Paxil) 10 MG tablet, Take 1 tablet by mouth Every Morning., Disp: 90 tablet, Rfl: 3    tiotropium (Spiriva HandiHaler) 18 MCG per inhalation capsule, Place 1 capsule into inhaler and inhale Daily., Disp: 90 capsule, Rfl: 6    Ventolin  (90 Base) MCG/ACT inhaler, Inhale 2 puffs Every 4 (Four) Hours As Needed for Wheezing., Disp: 18 g, Rfl: 3    budesonide (Pulmicort) 0.5 MG/2ML nebulizer solution, Take 2 mL by nebulization 2 (Two) Times a Day., Disp: 360 mL, Rfl: 3    formoterol (PERFOROMIST) 20 MCG/2ML nebulizer solution, Take 2 mL by nebulization 2 (Two) Times a Day., Disp: 360 mL, Rfl: 3    predniSONE (DELTASONE) 10 MG tablet, Take 1 tablet by mouth Daily., Disp: 45 tablet, Rfl: 1  MEDICATION LIST AND ALLERGIES REVIEWED.    Social History     Tobacco Use    Smoking status: Former     Current packs/day: 0.00     Average packs/day: 2.0 packs/day for 30.0 years (60.0 ttl pk-yrs)     Types: Cigarettes     Start date:      Quit date:      Years since quittin.5    Smokeless tobacco: Never    Tobacco comments:     fomer smoker 2 ppd x 30 years-Still smoke 1 cig, sporadically   Vaping Use    Vaping status: Never Used   Substance Use Topics    Alcohol use: Not  "Currently    Drug use: Yes     Types: Marijuana     Comment: occasionally smoked marijuana       FAMILY AND SOCIAL HISTORY REVIEWED.    Review of Systems   Constitutional:  Positive for fatigue. Negative for activity change, appetite change, fever and unexpected weight change.   HENT:  Negative for congestion, postnasal drip, rhinorrhea, sinus pressure, sore throat and voice change.    Eyes:  Negative for visual disturbance.   Respiratory:  Positive for shortness of breath. Negative for cough, chest tightness and wheezing.    Cardiovascular:  Negative for chest pain, palpitations and leg swelling.   Gastrointestinal:  Negative for abdominal distention, abdominal pain, nausea and vomiting.   Endocrine: Negative for cold intolerance and heat intolerance.   Genitourinary:  Negative for difficulty urinating and urgency.   Musculoskeletal:  Negative for arthralgias, back pain and neck pain.   Skin:  Negative for color change and pallor.   Allergic/Immunologic: Negative for environmental allergies and food allergies.   Neurological:  Negative for dizziness, syncope, weakness and light-headedness.   Hematological:  Negative for adenopathy. Does not bruise/bleed easily.   Psychiatric/Behavioral:  Negative for agitation and behavioral problems.    .    /72   Pulse 91   Temp 98.1 °F (36.7 °C)   Ht 170.2 cm (67\")   Wt 67.1 kg (148 lb)   LMP  (LMP Unknown) Comment: last mammogram 2020  SpO2 96% Comment: Room air at rest  BMI 23.18 kg/m²     Immunization History   Administered Date(s) Administered    COVID-19 (PFIZER) Purple Cap Monovalent 07/18/2021, 08/10/2021    Flu Vaccine Quad PF >36MO 10/04/2016, 01/30/2019    Fluzone (or Fluarix & Flulaval for VFC) >6mos 10/22/2020    Influenza TIV (IM) 10/20/2008    Influenza, Unspecified 10/04/2016    Pneumococcal Conjugate 20-Valent (PCV20) 09/07/2022    Pneumococcal Polysaccharide (PPSV23) 10/22/2020    Tdap 06/29/2010, 06/29/2010       Physical Exam  Vitals and nursing " note reviewed.   Constitutional:       Appearance: She is well-developed. She is not diaphoretic.   HENT:      Head: Normocephalic and atraumatic.   Eyes:      Pupils: Pupils are equal, round, and reactive to light.   Neck:      Thyroid: No thyromegaly.   Cardiovascular:      Rate and Rhythm: Normal rate and regular rhythm.      Heart sounds: Normal heart sounds. No murmur heard.     No friction rub. No gallop.   Pulmonary:      Effort: Pulmonary effort is normal. No respiratory distress.      Breath sounds: Normal breath sounds. No wheezing or rales.   Chest:      Chest wall: No tenderness.   Abdominal:      General: Bowel sounds are normal.      Palpations: Abdomen is soft.      Tenderness: There is no abdominal tenderness.   Musculoskeletal:         General: No swelling. Normal range of motion.      Cervical back: Normal range of motion and neck supple.   Lymphadenopathy:      Cervical: No cervical adenopathy.   Skin:     General: Skin is warm and dry.      Capillary Refill: Capillary refill takes less than 2 seconds.   Neurological:      Mental Status: She is alert and oriented to person, place, and time.   Psychiatric:         Mood and Affect: Mood normal.         Behavior: Behavior normal.         RESULTS    PROBLEM LIST    Problem List Items Addressed This Visit          Gastrointestinal Abdominal     GERD        Pulmonary and Pneumonias    Emphysema/COPD - Primary    Overview      Emphysema/COPD (492.8)   · A.  Moderate to severe obstruction, former tobacco abuse.B.  Controlled with the      azithromycin every Monday Wednesday Friday.      B. 07/06/15 Spirometry reveals an increase in obstruction, FEV1 now 42% instead of 53%. Minute ventilation reduced. Forced vital capacity is reduced suggesting a restrictive pattern.          Relevant Medications    tiotropium (Spiriva HandiHaler) 18 MCG per inhalation capsule    formoterol (PERFOROMIST) 20 MCG/2ML nebulizer solution    budesonide (Pulmicort) 0.5 MG/2ML  nebulizer solution    predniSONE (DELTASONE) 10 MG tablet    Other Relevant Orders    Oxygen Therapy    Home Nebulizer Accessories    Motorized Scooter    A/C Respiratory Failure Type 2       Tobacco    Personal history of smoking    Overview     Has smoked cigarettes within prior year (V15.82)   · A.  The patient is a former smoker of 2 packs of cigarettes a day for 30 years prior to      quitting in 2004.   Pt will smoke 1 cigarette sporadically 07/06/15              DISCUSSION  Ms. Huff was here today for follow-up.  She seems to be doing okay from a pulmonary standpoint.  She has noticed worsening shortness of breath since her last appointment.  She states that the humidity does make her breathing worse as well.    We are going to try switching her to Perforomist and budesonide nebulizers twice a day and Spiriva daily.  She will continue the Breztri until she receives the nebulizer medicine in the mail.     I did encourage her to use the DuoNebs up to 4 times a day for her shortness of breath or wheezing.    I did give her a handicap sticker for her car today in the office.    I did encourage her to wear her oxygen at 2 L to maintain a saturation above 88% at all times.  I do think that she needs to be wearing her oxygen more during the day.    I saw Ms. Huff today to discuss her mobility issues and limitation.  She has the following diagnoses that limits her mobility and affects her activities for daily living; end-stage COPD.  We have considered and ruled out the following; cane/walker/manual wheelchair.  A motorized scooter is needed for safe and functional mobility within the home allowing them to be able to take care of themselves during the day, make it to the bathroom in a timely manner, prevent falls, and allow them to prepare meals.  This patient is willing and capable of safely using the equipment prescribed.  Patient will need a physical therapist evaluation.    She will continue omeprazole  daily for GERD.  We also discussed reflux precautions in the office today.    She does not qualify for yearly CT screenings as she quit smoking 20 years ago.    We will have her follow-up in 6 months with a chest x-ray.    I personally spent a total of 32 minutes on patient visit today including chart review, face to face with the patient obtaining the history and physical exam, review of pertinent images and tests, counseling and discussion and/or coordination of care as described above, and documentation.  Total time excludes time spent on other separate services such as performing procedures or test interpretation, if applicable.        Maryellen Higuera, APRN  07/01/202409:53 EDT  Electronically signed     Please note that portions of this note were completed with a voice recognition program.        CC: Alfonso Denney MD

## 2024-07-17 ENCOUNTER — TELEPHONE (OUTPATIENT)
Age: 64
End: 2024-07-17
Payer: MEDICARE

## 2024-07-17 DIAGNOSIS — J43.2 CENTRILOBULAR EMPHYSEMA: ICD-10-CM

## 2024-07-17 DIAGNOSIS — J43.2 CENTRILOBULAR EMPHYSEMA: Primary | ICD-10-CM

## 2024-07-17 RX ORDER — TIOTROPIUM BROMIDE 18 UG/1
1 CAPSULE ORAL; RESPIRATORY (INHALATION) DAILY
Qty: 90 CAPSULE | Refills: 6 | Status: CANCELLED | OUTPATIENT
Start: 2024-07-17

## 2024-07-17 RX ORDER — TIOTROPIUM BROMIDE 18 UG/1
1 CAPSULE ORAL; RESPIRATORY (INHALATION) DAILY
Qty: 90 CAPSULE | Refills: 3 | Status: SHIPPED | OUTPATIENT
Start: 2024-07-17

## 2024-07-17 NOTE — TELEPHONE ENCOUNTER
Patient hasn't heard from anyone on med refill    tiotropium (Spiriva HandiHaler) 18 MCG per inhalation capsule    Pharmacy is [facility] [place]  [phone]  [phone]    Patient also needs the power chair ordered

## 2024-07-22 ENCOUNTER — TELEPHONE (OUTPATIENT)
Age: 64
End: 2024-07-22

## 2024-07-22 NOTE — TELEPHONE ENCOUNTER
Completed PA on Spiriva HandiHaler 18MCG capsules, States that last year's PA is good until 7/25/24 and will not let me send it through. However, when I called the pharmacy, they stated that insurance is still requesting a PA. Got in touch with pt and she agreed to wait until the 25th and to take her nebs and rescue inhaler as instructed. Told pt I would keep her updated and resend PA on the 25th. Pt verbalized understanding.

## 2024-07-26 DIAGNOSIS — J43.2 CENTRILOBULAR EMPHYSEMA: ICD-10-CM

## 2024-07-26 DIAGNOSIS — J43.9 PULMONARY EMPHYSEMA, UNSPECIFIED EMPHYSEMA TYPE: ICD-10-CM

## 2024-07-26 RX ORDER — ALBUTEROL SULFATE 90 UG/1
2 AEROSOL, METERED RESPIRATORY (INHALATION) EVERY 4 HOURS PRN
Qty: 18 G | Refills: 3 | Status: SHIPPED | OUTPATIENT
Start: 2024-07-26

## 2024-07-26 RX ORDER — IPRATROPIUM BROMIDE AND ALBUTEROL SULFATE 2.5; .5 MG/3ML; MG/3ML
3 SOLUTION RESPIRATORY (INHALATION) 4 TIMES DAILY PRN
Qty: 360 ML | Refills: 3 | Status: SHIPPED | OUTPATIENT
Start: 2024-07-26

## 2024-07-26 NOTE — TELEPHONE ENCOUNTER
Resent PA today. States that medication is available without PA. Called pharmacy and they stated that they got generic to go through yesterday.

## 2024-08-14 ENCOUNTER — TELEPHONE (OUTPATIENT)
Dept: CARDIOLOGY | Facility: CLINIC | Age: 64
End: 2024-08-14
Payer: MEDICARE

## 2024-08-15 PROCEDURE — 93296 REM INTERROG EVL PM/IDS: CPT | Performed by: INTERNAL MEDICINE

## 2024-08-15 PROCEDURE — 93294 REM INTERROG EVL PM/LDLS PM: CPT | Performed by: INTERNAL MEDICINE

## 2024-08-26 ENCOUNTER — TELEPHONE (OUTPATIENT)
Dept: INTERNAL MEDICINE | Facility: CLINIC | Age: 64
End: 2024-08-26
Payer: MEDICARE

## 2024-08-26 NOTE — TELEPHONE ENCOUNTER
Patient called requesting Mammogram order. She stated she notice a difference in her breast and it produced bloody/clear liquid. She is requesting to have it done in [facility]

## 2024-09-16 ENCOUNTER — TELEPHONE (OUTPATIENT)
Dept: CARDIOLOGY | Facility: CLINIC | Age: 64
End: 2024-09-16

## 2024-09-16 NOTE — TELEPHONE ENCOUNTER
Name: Nolvia Huff [facility]    Relationship: Self    Best Callback Number: [phone]    Incoming call to the Hub, requesting to  Reschedule their Device Check appointment on 9/23/24.     Per Hub workflow, further review is needed before the task can be completed.    Result of Call: Please reach out to the patient to reschedule

## 2024-11-20 ENCOUNTER — TELEPHONE (OUTPATIENT)
Dept: CARDIOLOGY | Facility: CLINIC | Age: 64
End: 2024-11-20
Payer: MEDICARE

## 2024-11-20 DIAGNOSIS — J43.9 PULMONARY EMPHYSEMA, UNSPECIFIED EMPHYSEMA TYPE: ICD-10-CM

## 2024-11-20 DIAGNOSIS — J43.2 CENTRILOBULAR EMPHYSEMA: ICD-10-CM

## 2024-11-20 RX ORDER — PREDNISONE 10 MG/1
10 TABLET ORAL DAILY
Qty: 45 TABLET | Refills: 3 | Status: SHIPPED | OUTPATIENT
Start: 2024-11-20

## 2024-11-20 RX ORDER — ALBUTEROL SULFATE 90 UG/1
2 AEROSOL, METERED RESPIRATORY (INHALATION) EVERY 4 HOURS PRN
Qty: 18 G | Refills: 3 | Status: SHIPPED | OUTPATIENT
Start: 2024-11-20

## 2024-12-03 ENCOUNTER — TELEPHONE (OUTPATIENT)
Age: 64
End: 2024-12-03
Payer: MEDICARE

## 2024-12-03 DIAGNOSIS — J43.9 PULMONARY EMPHYSEMA, UNSPECIFIED EMPHYSEMA TYPE: Primary | ICD-10-CM

## 2024-12-03 NOTE — TELEPHONE ENCOUNTER
Patient is calling saying that her nebulizer is broken.  She needs a new order sent to [facility] for the nebulizer, and supplies.  She would like the mouth set and mask. She like to switch those out from time to time.  Patient also said that she would like a phone call when the order is sent in.

## 2025-01-19 DIAGNOSIS — J43.2 CENTRILOBULAR EMPHYSEMA: ICD-10-CM

## 2025-01-20 RX ORDER — IPRATROPIUM BROMIDE AND ALBUTEROL SULFATE 2.5; .5 MG/3ML; MG/3ML
3 SOLUTION RESPIRATORY (INHALATION) 4 TIMES DAILY PRN
Qty: 240 ML | Refills: 0 | Status: SHIPPED | OUTPATIENT
Start: 2025-01-20

## 2025-02-19 ENCOUNTER — TELEPHONE (OUTPATIENT)
Dept: CARDIOLOGY | Facility: CLINIC | Age: 65
End: 2025-02-19
Payer: MEDICARE

## 2025-02-24 ENCOUNTER — PATIENT ROUNDING (BHMG ONLY) (OUTPATIENT)
Dept: CARDIOLOGY | Facility: CLINIC | Age: 65
End: 2025-02-24

## 2025-02-24 ENCOUNTER — PATIENT ROUNDING (BHMG ONLY) (OUTPATIENT)
Dept: CARDIOLOGY | Facility: CLINIC | Age: 65
End: 2025-02-24
Payer: MEDICARE

## 2025-02-24 ENCOUNTER — OFFICE VISIT (OUTPATIENT)
Dept: CARDIOLOGY | Facility: CLINIC | Age: 65
End: 2025-02-24
Payer: MEDICARE

## 2025-02-24 VITALS
WEIGHT: 141.8 LBS | HEART RATE: 67 BPM | SYSTOLIC BLOOD PRESSURE: 118 MMHG | OXYGEN SATURATION: 86 % | DIASTOLIC BLOOD PRESSURE: 60 MMHG | HEIGHT: 68 IN | BODY MASS INDEX: 21.49 KG/M2

## 2025-02-24 DIAGNOSIS — I48.21 PERMANENT ATRIAL FIBRILLATION: ICD-10-CM

## 2025-02-24 DIAGNOSIS — I44.2 COMPLETE HEART BLOCK: Primary | ICD-10-CM

## 2025-02-24 DIAGNOSIS — Z95.818 PRESENCE OF WATCHMAN LEFT ATRIAL APPENDAGE CLOSURE DEVICE: ICD-10-CM

## 2025-02-24 DIAGNOSIS — Z95.0 CARDIAC PACEMAKER IN SITU: ICD-10-CM

## 2025-02-24 PROCEDURE — 99214 OFFICE O/P EST MOD 30 MIN: CPT | Performed by: INTERNAL MEDICINE

## 2025-02-24 PROCEDURE — 93279 PRGRMG DEV EVAL PM/LDLS PM: CPT | Performed by: INTERNAL MEDICINE

## 2025-02-24 NOTE — PROGRESS NOTES
February 24, 2025    Hello, may I speak with Nolvia Huff? Yes.    My name is Samara HUERTA.      I am  with [facility]  [address]  [address]  [place] [address] [phone].    Before we get started may I verify your date of birth? 1960, Yes, correct.    I am calling to officially welcome you to our practice and ask about your recent visit. Is this a good time to talk? yes    Tell me about your visit with us. What things went well?  Everything was good.  I love [facility]!       We're always looking for ways to make our patients' experiences even better. Do you have recommendations on ways we may improve?  no    Overall were you satisfied with your first visit to our practice? yes       I appreciate you taking the time to speak with me today. Is there anything else I can do for you? no      Thank you, and have a great day.

## 2025-02-24 NOTE — PROGRESS NOTES
Cardiac Electrophysiology Outpatient Follow Up Note            [facility]    Follow Up Office Visit      Nolvia Huff  [id number]  02/24/2025  [unfilled]  [unfilled]    Primary Care Physician: Alfonso Denney MD    Referred By: No ref. provider found    Subjective     Chief Complaint:   Diagnoses and all orders for this visit:    1. Complete heart block (Primary)    2. Cardiac pacemaker in situ    3. Permanent atrial fibrillation    4. Presence of Watchman left atrial appendage closure device      Chief Complaint   Patient presents with    Complete heart block     1 yr       History of Present Illness:   Nolvia Huff is a 65 y.o. female who presents to my electrophysiology clinic for follow up of above.      Past Medical History:   Past Medical History:   Diagnosis Date    Acute otitis media     H/o    Atrial fibrillation     COPD (chronic obstructive pulmonary disease)     inhalers prn     Dependence on nocturnal oxygen therapy     2-3 liters at night and daytime use during summer months     GERD (gastroesophageal reflux disease)     H/O chest x-ray 10/04/2011    Cardiac silhouute normal limits. The pulmonary vasculature appears normal, The diaphragms are flattened bilaterally which is sug of COPD. Lungs are grossly clear bilaterally with no prominent nodules or masses seen. No change when compared to previous exam from 11/10/09    H/O chest x-ray 05/31/2011    Chronic change. No active disease    H/O chest x-ray 11/10/2009    Moderate degenerative changes of thoracic spine. Cardiac silhoutte appears normal. There is slight hyperexpansion. There were a few scattered calcified granulomas. No acute appearing infiltrates noted. no real change compared to 10/20/08    History of PFTs 07/06/2015    Severe OAD w/ dec in FEV from prior. no resp to BDRX. MVV reduced. FVC reduced; sugg restricitve pattern    History of PFTs 10/21/2013    Mod severe OAD, FVC  normal, no restriction, MVV reduced    History of PFTs 2012    Severe OAD, FVC reduced, MVV dec    Normal stillborn     Pt had stillborn infant    Pacemaker        Past Surgical History:   Past Surgical History:   Procedure Laterality Date    ATRIAL APPENDAGE EXCLUSION LEFT WITH TRANSESOPHAGEAL ECHOCARDIOGRAM N/A 2021    Procedure: Atrial Appendage Occlusion (Watchman), on Eliquis hold 2 doses;  Surgeon: Maximus Solomon DO;  Location:  [facility];  Service: Cardiology;  Laterality: N/A;    BREAST CYST ASPIRATION Right     CARDIAC ELECTROPHYSIOLOGY PROCEDURE N/A 2020    Procedure: MICRA PACEMAKER IMPLANTATION + AVN;  Surgeon: Maximus Solomon DO;  Location: [facility];  Service: Cardiology;  Laterality: N/A;    COLONOSCOPY      HYSTERECTOMY      TONSILLECTOMY         Family History:   Family History   Problem Relation Age of Onset    Emphysema Mother     Cancer Mother         laryngeal carcinoma    Heart murmur Father     Cerebral palsy Sister     Breast cancer Sister     Asthma Brother     Heart murmur Brother     Heart disease Maternal Grandmother     Cancer Maternal Grandfather         laryngeal carcinoma.    No Known Problems Paternal Grandmother     No Known Problems Paternal Grandfather     No Known Problems Son     Other Son          at birth       Social History:   Social History     Socioeconomic History    Marital status:    Tobacco Use    Smoking status: Former     Current packs/day: 0.00     Average packs/day: 2.0 packs/day for 30.0 years (60.0 ttl pk-yrs)     Types: Cigarettes     Start date:      Quit date:      Years since quittin.1    Smokeless tobacco: Never    Tobacco comments:     fomer smoker 2 ppd x 30 years-Still smoke 1 cig, sporadically   Vaping Use    Vaping status: Never Used   Substance and Sexual Activity    Alcohol use: Not Currently    Drug use: Yes     Types: Marijuana     Comment: occasionally smoked marijuana  "   Sexual activity: Defer       Medications:     Current Outpatient Medications:     Aspirin Adult Low Strength 81 MG EC tablet, Take 1 tablet by mouth Daily., Disp: , Rfl:     azithromycin (Zithromax) 250 MG tablet, Take 1 tablet by mouth Daily., Disp: 90 tablet, Rfl: 3    budesonide (Pulmicort) 0.5 MG/2ML nebulizer solution, Take 2 mL by nebulization 2 (Two) Times a Day., Disp: 360 mL, Rfl: 3    formoterol (PERFOROMIST) 20 MCG/2ML nebulizer solution, Take 2 mL by nebulization 2 (Two) Times a Day., Disp: 360 mL, Rfl: 3    GNP Mucus  MG 12 hr tablet, Take 1 tablet by mouth 2 (Two) Times a Day., Disp: 120 tablet, Rfl: 6    ipratropium-albuterol (DUO-NEB) 0.5-2.5 mg/3 ml nebulizer, Take 3 mL by nebulization 4 (Four) Times a Day As Needed for Wheezing., Disp: 240 mL, Rfl: 0    montelukast (SINGULAIR) 10 MG tablet, Take 1 tablet by mouth Every Night., Disp: 90 tablet, Rfl: 3    O2 (OXYGEN), Inhale 2 L/min Every Night., Disp: , Rfl:     omeprazole (priLOSEC) 40 MG capsule, Take 1 capsule by mouth Daily. (Patient taking differently: Take 1 capsule by mouth As Needed.), Disp: 30 capsule, Rfl: 11    PARoxetine (Paxil) 10 MG tablet, Take 1 tablet by mouth Every Morning., Disp: 90 tablet, Rfl: 3    predniSONE (DELTASONE) 10 MG tablet, Take 1 tablet by mouth Daily., Disp: 45 tablet, Rfl: 3    tiotropium (Spiriva HandiHaler) 18 MCG per inhalation capsule, Place 1 capsule into inhaler and inhale Daily., Disp: 90 capsule, Rfl: 3    Ventolin  (90 Base) MCG/ACT inhaler, Inhale 2 puffs Every 4 (Four) Hours As Needed for Wheezing., Disp: 18 g, Rfl: 3    Allergies:   Allergies   Allergen Reactions    Penicillins Anaphylaxis    Tikosyn [Dofetilide] Other (See Comments)     Torsades       Objective   Vital Signs:   Vitals:    02/24/25 1101   BP: 118/60   BP Location: Right arm   Patient Position: Sitting   Cuff Size: Adult   Pulse: 67   SpO2: (!) 86%   Weight: 64.3 kg (141 lb 12.8 oz)   Height: 172.7 cm (68\")       PHYSICAL " EXAM  General appearance: Awake, alert, cooperative  Head: Normocephalic, without obvious abnormality, atraumatic  Eyes: Conjunctivae/corneas clear, EOMs intact  Neck: no adenopathy, no carotid bruit, no JVD, and thyroid: not enlarged  Lungs: Significant prolonged bilateral expiratory phase.   Heart: regular rate and rhythm, S1, S2 normal, no murmur, click, rub or gallop  Abdomen: Soft, non-tender, bowel sounds normal,  no organomegaly  Extremities: extremities normal, atraumatic, no cyanosis or edema  Skin: Skin color, turgor normal, no rashes or lesions  Neurologic: Grossly normal     Lab Results   Component Value Date    GLUCOSE 85 09/07/2022    CALCIUM 9.5 09/07/2022     09/07/2022    K 4.4 09/07/2022    CO2 34.0 (H) 09/07/2022     09/07/2022    BUN 13 09/07/2022    CREATININE 0.65 09/07/2022    EGFRIFNONA 87 02/05/2021    BCR 20.0 09/07/2022    ANIONGAP 8.0 09/07/2022     Lab Results   Component Value Date    WBC 8.49 09/07/2022    HGB 16.1 (H) 09/07/2022    HCT 46.8 (H) 09/07/2022    MCV 89.3 09/07/2022     09/07/2022     Lab Results   Component Value Date    INR 1.14 09/21/2020    PROTIME 14.3 (H) 09/21/2020     Lab Results   Component Value Date    TSH 2.140 09/07/2022       Cardiac Testing:     I personally viewed and interpreted the patient's EKG/Telemetry/lab data    Procedures    Tobacco Cessation: N/A  Obstructive Sleep Apnea Screening: Completed    Advance Care Planning   ACP discussion was declined by the patient. Patient does not have an advance directive, declines further assistance.       Assessment & Plan    Diagnoses and all orders for this visit:    1. Complete heart block (Primary)    2. Cardiac pacemaker in situ    3. Permanent atrial fibrillation    4. Presence of Watchman left atrial appendage closure device         Diagnosis Plan   1. Complete heart block  Status post AV node ablation.      2. Cardiac pacemaker in situ  Medtronic Micra VR      This patient's Cardiac  Implanted Electronic Device was manually interrogated and reprogrammed during the patient encounter today.  Iterative programming changes were manually made to determine the sensing threshold, pacing threshold, lead impedance as well as underlying cardiac rhythm.  These programming changes were not limited to but included some or all of the following when appropriate: pacing mode, programmed AV delays, blanking periods, and refractory periods.  Data obtained as a result of these manual programing changes informed the patient's CIED permanent programming.        3. Permanent atrial fibrillation  Definitive rate control.      4. Presence of Watchman left atrial appendage closure device  Left atrial Penders closure documented   Significant COPD exacerbation today.  She started a higher dose of steroid at home.  She has conversational dyspnea.  She has enough steroids and nebulizer treatment at home she states.  She prefers to go home at this point.  I think this is reasonable.  She will call me if she needs anything.     Body mass index is 21.56 kg/m².    I spent 35 minutes in consultation with this patient which included more than 65% of this time in direct face-to-face counseling, physical examination and discussion of my assessment and findings and this shared decision making with the patient.  The remainder of the time not spent face-to-face was performing one, some or all of the following actions: preparing to see the patient (e.g. reviewing tests, prior clinicians' notes, etc), ordering medications, tests or procedures, coordination of care, discussion of the plan with other healthcare providers, documenting clinical information in epic as well as independently interpreting results and communication of these results to the patient family and/or caregiver(s).  Please note that this explicitly excludes time spent on other separate billable services such as performing procedures or test interpretation, when  applicable.      Follow Up:       Thank you for allowing me to participate in the care of your patient. Please to not hesitate to contact me with additional questions or concerns.      Maximus Solomon DO, FACC, RS  Cardiac Electrophysiologist  [facility] / [facility]

## 2025-02-24 NOTE — PROGRESS NOTES
February 24, 2025    Hello, may I speak with Nolvia Huff? Yes.    My name is Samara HUERTA.      I am  with [facility]  [address]  [address]  [facility] [address] [phone].    Before we get started may I verify your date of birth? 1960, Yes, correct.    I am calling to officially welcome you to our practice and ask about your recent visit. Is this a good time to talk? yes    Tell me about your visit with us. What things went well?  Everything is good, I love [facility]!!!       We're always looking for ways to make our patients' experiences even better. Do you have recommendations on ways we may improve?  no    Overall were you satisfied with your first visit to our practice? yes       I appreciate you taking the time to speak with me today. Is there anything else I can do for you? no      Thank you, and have a great day.

## 2025-03-25 DIAGNOSIS — J43.9 PULMONARY EMPHYSEMA, UNSPECIFIED EMPHYSEMA TYPE: ICD-10-CM

## 2025-03-25 RX ORDER — ALBUTEROL SULFATE 90 UG/1
2 AEROSOL, METERED RESPIRATORY (INHALATION) EVERY 4 HOURS PRN
Qty: 18 G | Refills: 6 | Status: SHIPPED | OUTPATIENT
Start: 2025-03-25

## 2025-03-31 ENCOUNTER — OFFICE VISIT (OUTPATIENT)
Age: 65
End: 2025-03-31
Payer: MEDICARE

## 2025-03-31 VITALS
TEMPERATURE: 98.4 F | BODY MASS INDEX: 21.43 KG/M2 | DIASTOLIC BLOOD PRESSURE: 72 MMHG | SYSTOLIC BLOOD PRESSURE: 116 MMHG | WEIGHT: 141.4 LBS | HEART RATE: 85 BPM | HEIGHT: 68 IN | OXYGEN SATURATION: 92 %

## 2025-03-31 DIAGNOSIS — J43.2 CENTRILOBULAR EMPHYSEMA: Primary | ICD-10-CM

## 2025-03-31 DIAGNOSIS — G47.34 NOCTURNAL HYPOXIA: ICD-10-CM

## 2025-03-31 DIAGNOSIS — K21.9 GASTROESOPHAGEAL REFLUX DISEASE WITHOUT ESOPHAGITIS: ICD-10-CM

## 2025-03-31 DIAGNOSIS — Z87.891 PERSONAL HISTORY OF SMOKING: ICD-10-CM

## 2025-03-31 PROCEDURE — 1159F MED LIST DOCD IN RCRD: CPT | Performed by: NURSE PRACTITIONER

## 2025-03-31 PROCEDURE — 99214 OFFICE O/P EST MOD 30 MIN: CPT | Performed by: NURSE PRACTITIONER

## 2025-03-31 PROCEDURE — G2211 COMPLEX E/M VISIT ADD ON: HCPCS | Performed by: NURSE PRACTITIONER

## 2025-03-31 PROCEDURE — 1160F RVW MEDS BY RX/DR IN RCRD: CPT | Performed by: NURSE PRACTITIONER

## 2025-03-31 RX ORDER — AZITHROMYCIN 250 MG/1
250 TABLET, FILM COATED ORAL DAILY
Qty: 90 TABLET | Refills: 3 | Status: SHIPPED | OUTPATIENT
Start: 2025-03-31

## 2025-03-31 RX ORDER — PREDNISONE 10 MG/1
10 TABLET ORAL DAILY
Qty: 90 TABLET | Refills: 3 | Status: SHIPPED | OUTPATIENT
Start: 2025-03-31

## 2025-03-31 RX ORDER — IPRATROPIUM BROMIDE AND ALBUTEROL SULFATE 2.5; .5 MG/3ML; MG/3ML
3 SOLUTION RESPIRATORY (INHALATION) 4 TIMES DAILY PRN
Qty: 240 ML | Refills: 6 | Status: SHIPPED | OUTPATIENT
Start: 2025-03-31

## 2025-03-31 RX ORDER — VALACYCLOVIR HYDROCHLORIDE 500 MG/1
TABLET, FILM COATED ORAL
COMMUNITY
Start: 2025-03-12

## 2025-03-31 NOTE — PROGRESS NOTES
[facility] Pulmonary Follow up    CHIEF COMPLAINT    Dyspnea with exertion    HISTORY OF PRESENT ILLNESS    Nolvia Huff is a 65 y.o.female here today for follow-up.  She was last seen in the office by me in July.  She denies any respiratory illnesses since her last appointment.    She states that the cold air did cause her breathing to be worse over the winter.  She got a virus from her grandchild over the winter but nothing major.    She uses the Breztri twice a day.  She uses the albuterol occasionally.  She feels like this is a good regimen.  She has the nebulizers to use as needed.  She tries to get these and at least twice a day.    She has shortness of breath with any exertion.  She does have to take frequent breaks to recover.    He received her motorized wheelchair and is doing well with it.  She states it helps a lot.    She is in need of new nebulizer machine.  She has had a hard time getting it through her local medical company.    She uses a portable oxygen occasionally when needed.  She does wear her oxygen at nighttime.    She uses prednisone occasionally but not on a daily basis.  She does get short of breath on certain days and will take prednisone.    She remains on azithromycin every Monday, Wednesday and Friday.  She does sometimes miss a dose.    She denies any chest pain or palpitations.  She denies any lower extremity edema or calf tenderness.    She denies any reflux symptoms.  He does take her medication daily.    She quit smoking 20 years ago and has a 60-pack-year history.    Patient Active Problem List   Diagnosis    Personal history of smoking    Other specified anxiety disorders    Dyslipidemia    Emphysema/COPD    GERD     Osteoporosis    Nocturnal hypoxia on 2L NC     Pulmonary emphysema    A/C Respiratory Failure Type 2    Former smoker    Complete heart block    Cardiac pacemaker in situ    Permanent atrial fibrillation    Presence of Watchman left atrial appendage closure device        Allergies   Allergen Reactions    Penicillins Anaphylaxis    Tikosyn [Dofetilide] Other (See Comments)     Torsades       Current Outpatient Medications:     albuterol sulfate HFA (Ventolin HFA) 108 (90 Base) MCG/ACT inhaler, Inhale 2 puffs Every 4 (Four) Hours As Needed for Wheezing., Disp: 18 g, Rfl: 6    azithromycin (Zithromax) 250 MG tablet, Take 1 tablet by mouth Daily., Disp: 90 tablet, Rfl: 3    budesonide (Pulmicort) 0.5 MG/2ML nebulizer solution, Take 2 mL by nebulization 2 (Two) Times a Day., Disp: 360 mL, Rfl: 3    formoterol (PERFOROMIST) 20 MCG/2ML nebulizer solution, Take 2 mL by nebulization 2 (Two) Times a Day., Disp: 360 mL, Rfl: 3    GNP Mucus  MG 12 hr tablet, Take 1 tablet by mouth 2 (Two) Times a Day., Disp: 120 tablet, Rfl: 6    ipratropium-albuterol (DUO-NEB) 0.5-2.5 mg/3 ml nebulizer, Take 3 mL by nebulization 4 (Four) Times a Day As Needed for Wheezing., Disp: 240 mL, Rfl: 6    montelukast (SINGULAIR) 10 MG tablet, Take 1 tablet by mouth Every Night. (Patient taking differently: Take 1 tablet by mouth As Needed.), Disp: 90 tablet, Rfl: 3    O2 (OXYGEN), Inhale 2 L/min Every Night., Disp: , Rfl:     omeprazole (priLOSEC) 40 MG capsule, Take 1 capsule by mouth Daily. (Patient taking differently: Take 1 capsule by mouth As Needed.), Disp: 30 capsule, Rfl: 11    predniSONE (DELTASONE) 10 MG tablet, Take 1 tablet by mouth Daily., Disp: 90 tablet, Rfl: 3    tiotropium (Spiriva HandiHaler) 18 MCG per inhalation capsule, Place 1 capsule into inhaler and inhale Daily., Disp: 90 capsule, Rfl: 3    valACYclovir (VALTREX) 500 MG tablet, Take one tablet by mouth 3 times a day for 7 days, then once a day for 30 days., Disp: , Rfl:   MEDICATION LIST AND ALLERGIES REVIEWED.    Social History     Tobacco Use    Smoking status: Former     Current packs/day: 0.00     Average packs/day: 2.0 packs/day for 30.0 years (60.0 ttl pk-yrs)     Types: Cigarettes     Start date: 1974     Quit date: 2004      "Years since quittin.2    Smokeless tobacco: Never    Tobacco comments:     fomer smoker 2 ppd x 30 years-Still smoke 1 cig, sporadically   Vaping Use    Vaping status: Never Used   Substance Use Topics    Alcohol use: Not Currently    Drug use: Yes     Types: Marijuana     Comment: occasionally smoked marijuana       FAMILY AND SOCIAL HISTORY REVIEWED.    Review of Systems   Constitutional:  Positive for fatigue. Negative for activity change, appetite change, fever and unexpected weight change.   HENT:  Positive for congestion. Negative for postnasal drip, rhinorrhea, sinus pressure, sore throat and voice change.    Eyes:  Negative for visual disturbance.   Respiratory:  Positive for cough, chest tightness and shortness of breath. Negative for wheezing.    Cardiovascular:  Negative for chest pain, palpitations and leg swelling.   Gastrointestinal:  Negative for abdominal distention, abdominal pain, nausea and vomiting.   Endocrine: Negative for cold intolerance and heat intolerance.   Genitourinary:  Negative for difficulty urinating and urgency.   Musculoskeletal:  Negative for arthralgias, back pain and neck pain.   Skin:  Negative for color change and pallor.   Allergic/Immunologic: Negative for environmental allergies and food allergies.   Neurological:  Negative for dizziness, syncope, weakness and light-headedness.   Hematological:  Negative for adenopathy. Does not bruise/bleed easily.   Psychiatric/Behavioral:  Negative for agitation and behavioral problems.    .    /72   Pulse 85   Temp 98.4 °F (36.9 °C)   Ht 172.7 cm (68\")   Wt 64.1 kg (141 lb 6.4 oz)   LMP  (LMP Unknown) Comment: last mammogram   SpO2 92% Comment: Room air at rest  BMI 21.50 kg/m²     Immunization History   Administered Date(s) Administered    COVID-19 (PFIZER) Purple Cap Monovalent 2021, 08/10/2021    Flu Vaccine Quad PF >36MO 10/04/2016, 2019    Fluzone (or Fluarix & Flulaval for VFC) >6mos 10/22/2020 "    Influenza TIV (IM) 10/20/2008    Influenza, Unspecified 10/04/2016    Pneumococcal Conjugate 20-Valent (PCV20) 09/07/2022    Pneumococcal Polysaccharide (PPSV23) 10/22/2020    Tdap 06/29/2010, 06/29/2010       Physical Exam  Vitals and nursing note reviewed.   Constitutional:       Appearance: She is well-developed. She is not diaphoretic.   HENT:      Head: Normocephalic and atraumatic.   Eyes:      Pupils: Pupils are equal, round, and reactive to light.   Neck:      Thyroid: No thyromegaly.   Cardiovascular:      Rate and Rhythm: Normal rate and regular rhythm.      Heart sounds: Normal heart sounds. No murmur heard.     No friction rub. No gallop.   Pulmonary:      Effort: Pulmonary effort is normal. No respiratory distress.      Breath sounds: Normal breath sounds. No wheezing or rales.   Chest:      Chest wall: No tenderness.   Abdominal:      General: Bowel sounds are normal.      Palpations: Abdomen is soft.      Tenderness: There is no abdominal tenderness.   Musculoskeletal:         General: No swelling. Normal range of motion.      Cervical back: Normal range of motion and neck supple.   Lymphadenopathy:      Cervical: No cervical adenopathy.   Skin:     General: Skin is warm and dry.      Capillary Refill: Capillary refill takes less than 2 seconds.   Neurological:      Mental Status: She is alert and oriented to person, place, and time.   Psychiatric:         Mood and Affect: Mood normal.         Behavior: Behavior normal.           RESULTS    Chest PA/lateral: Official report pending    PROBLEM LIST    Problem List Items Addressed This Visit          Gastrointestinal Abdominal     GERD        Pulmonary and Pneumonias    Pulmonary emphysema - Primary    Overview   A.  Moderate to severe obstruction, former tobacco abuse.  B.  Controlled with the azithromycin every Monday Wednesday Friday.         Relevant Medications    ipratropium-albuterol (DUO-NEB) 0.5-2.5 mg/3 ml nebulizer    predniSONE  (DELTASONE) 10 MG tablet    azithromycin (Zithromax) 250 MG tablet    Other Relevant Orders    XR Chest PA & Lateral    Home Nebulizer    CT Chest Without Contrast       Sleep    Nocturnal hypoxia on 2L NC        Tobacco    Personal history of smoking    Overview   Has smoked cigarettes within prior year (V15.82)   · A.  The patient is a former smoker of 2 packs of cigarettes a day for 30 years prior to      quitting in 2004.   Pt will smoke 1 cigarette sporadically 07/06/15              DISCUSSION    Ms. Huff was here for follow-up.  Seems to be doing okay from pulmonary standpoint.  She will continue the Breztri twice a day.  I did encourage her to use the nebulizers as needed for shortness of breath or wheezing.  I will go ahead and order her a nebulizer machine as her current one is broken.      She will remain on azithromycin every Monday, Wednesday and Friday.    We reviewed her chest x-ray in the office today and official report is pending.  I will notify her of any abnormalities.  I am going to go ahead and order a CT scan without contrast as she has not had 1 to evaluate her lungs.    She will continue to wear her oxygen at 2 L to maintain saturation above 88% at all times.    She will continue medication daily for GERD.  We also discussed reflux precautions in the office today.    She does not meet qualifications for screening for low-dose as she quit smoking 20 years ago.    We will schedule her follow-up in 6 months.    I personally spent a total of 33 minutes on patient visit today including chart review, face to face with the patient obtaining the history and physical exam, review of pertinent images and tests, counseling and discussion and/or coordination of care as described above, and documentation.  Total time excludes time spent on other separate services such as performing procedures or test interpretation, if applicable.        Maryellen Higuera, APRN  03/31/202513:34 EDT  Electronically  signed     Please note that portions of this note were completed with a voice recognition program.        CC: Alfonso Denney MD

## 2025-05-05 ENCOUNTER — HOSPITAL ENCOUNTER (OUTPATIENT)
Dept: CT IMAGING | Facility: HOSPITAL | Age: 65
Discharge: HOME | End: 2025-05-05
Admitting: NURSE PRACTITIONER
Payer: MEDICARE

## 2025-05-05 DIAGNOSIS — J43.2 CENTRILOBULAR EMPHYSEMA: ICD-10-CM

## 2025-05-05 PROCEDURE — 71250 CT THORAX DX C-: CPT

## 2025-05-31 DIAGNOSIS — J43.2 CENTRILOBULAR EMPHYSEMA: ICD-10-CM

## 2025-06-02 RX ORDER — BUDESONIDE 0.5 MG/2ML
INHALANT ORAL
Qty: 240 ML | Refills: 11 | Status: SHIPPED | OUTPATIENT
Start: 2025-06-02

## 2025-06-02 RX ORDER — FORMOTEROL FUMARATE 20 UG/2ML
SOLUTION RESPIRATORY (INHALATION)
Qty: 240 ML | Refills: 11 | Status: SHIPPED | OUTPATIENT
Start: 2025-06-02

## 2025-07-29 DIAGNOSIS — J43.2 CENTRILOBULAR EMPHYSEMA: ICD-10-CM

## 2025-07-29 RX ORDER — TIOTROPIUM BROMIDE 18 UG/1
1 CAPSULE ORAL; RESPIRATORY (INHALATION) DAILY
Qty: 90 CAPSULE | Refills: 1 | Status: SHIPPED | OUTPATIENT
Start: 2025-07-29

## 2025-07-30 ENCOUNTER — TELEPHONE (OUTPATIENT)
Age: 65
End: 2025-07-30
Payer: MEDICARE

## 2025-08-28 LAB
MDC_IDC_MSMT_BATTERY_REMAINING_LONGEVITY: 96 MO
MDC_IDC_MSMT_BATTERY_RRT_TRIGGER: 2.56
MDC_IDC_MSMT_BATTERY_STATUS: NORMAL
MDC_IDC_MSMT_BATTERY_VOLTAGE: 2.99
MDC_IDC_MSMT_LEADCHNL_RV_DTM: NORMAL
MDC_IDC_MSMT_LEADCHNL_RV_IMPEDANCE_VALUE: 710
MDC_IDC_MSMT_LEADCHNL_RV_PACING_THRESHOLD_AMPLITUDE: 0.38
MDC_IDC_MSMT_LEADCHNL_RV_PACING_THRESHOLD_POLARITY: NORMAL
MDC_IDC_MSMT_LEADCHNL_RV_PACING_THRESHOLD_PULSEWIDTH: 0.2
MDC_IDC_MSMT_LEADCHNL_RV_SENSING_INTR_AMPL: 20.25
MDC_IDC_PG_IMPLANT_DTM: NORMAL
MDC_IDC_PG_MFG: NORMAL
MDC_IDC_PG_MODEL: NORMAL
MDC_IDC_PG_SERIAL: NORMAL
MDC_IDC_PG_TYPE: NORMAL
MDC_IDC_SESS_DTM: NORMAL
MDC_IDC_SESS_TYPE: NORMAL
MDC_IDC_SET_BRADY_LOWRATE: 60
MDC_IDC_SET_BRADY_MAX_SENSOR_RATE: 120
MDC_IDC_SET_BRADY_MODE: NORMAL
MDC_IDC_SET_LEADCHNL_RV_PACING_AMPLITUDE: 0.88
MDC_IDC_SET_LEADCHNL_RV_PACING_POLARITY: NORMAL
MDC_IDC_SET_LEADCHNL_RV_PACING_PULSEWIDTH: 0.2
MDC_IDC_SET_LEADCHNL_RV_SENSING_POLARITY: NORMAL
MDC_IDC_SET_LEADCHNL_RV_SENSING_SENSITIVITY: 2

## (undated) DEVICE — KT MANIFLD EP

## (undated) DEVICE — CVR TRANSD FLX 3DIMEN 14X29.2CM LF STRL

## (undated) DEVICE — TUBING, SUCTION, 1/4" X 10', STRAIGHT: Brand: MEDLINE

## (undated) DEVICE — ST EXT IV SMARTSITE 2VLV SP M LL 5ML IV1

## (undated) DEVICE — SOL NACL 0.9PCT 1000ML

## (undated) DEVICE — INTRO SHEATH FASTCATH SRO .038IN 8.5F 63CM

## (undated) DEVICE — LEX ELECTRO PHYSIOLOGY: Brand: MEDLINE INDUSTRIES, INC.

## (undated) DEVICE — CANN NASL CO2 DIVIDED A/

## (undated) DEVICE — GW SAFARI2 PRESH XSM CRV .035IN 3.2X2.9X275CM

## (undated) DEVICE — ST INF PRI SMRTSTE 20DRP 2VLV 24ML 117

## (undated) DEVICE — SYS CLS VASC/VENI VASCADE MVP 6TO12F

## (undated) DEVICE — ACCESS SHEATH WITH DILATOR: Brand: WATCHMAN™ TRUSEAL™ ACCESS SYSTEM

## (undated) DEVICE — LIMB HOLDER, WRIST/ANKLE: Brand: DEROYAL

## (undated) DEVICE — ST EXT IV LG BORE NDLESS FLTR LL 17IN

## (undated) DEVICE — DOME MONITORING W BONDED STPCK BIOTRANS2

## (undated) DEVICE — DRSNG SURESITE123 4X4.8IN

## (undated) DEVICE — DECANT BG O JET

## (undated) DEVICE — SHEATH INTRO MICRA HC 23F 55.7CM

## (undated) DEVICE — DIL VESL .038 16F 19CM

## (undated) DEVICE — SET PRIMARY GRVTY 10DP MALE LL 104IN

## (undated) DEVICE — KT VLV HEMO MAP ACC PLS LG/BORE MTL/INTRO W/TORQ/DEV

## (undated) DEVICE — GW TRNSEP SAFESEPT LT/ATRIAL RO 135CM .014IN

## (undated) DEVICE — INTRO SHEATH DRYSEAL FLEX 16F 5.3TO6.1MM 33CM

## (undated) DEVICE — DIL VESL .038 14F 19CM

## (undated) DEVICE — INTRO SHEATH TRNSEP .032 SL0 8.5F 63CM

## (undated) DEVICE — OPEN-IRRIGATION TUBING SET: Brand: METRIQ™ IRRIGATION TUBING SET

## (undated) DEVICE — SKIN PREP TRAY W/CHG: Brand: MEDLINE INDUSTRIES, INC.

## (undated) DEVICE — ADULT, W/LG. BACK PAD, RADIOTRANSPARENT ELEMENT AND LEAD WIRE: Brand: DEFIBRILLATION ELECTRODES

## (undated) DEVICE — SYS COMPR FEMOSTOP GLD W/PUMP LF

## (undated) DEVICE — INTRO SHEATH FAST/CATH LG/LUM 13F .038IN 12CM

## (undated) DEVICE — INTRO SHEATH FAST/CATH LG/LUM 11F .038IN 12CM

## (undated) DEVICE — SYS TRNSEP ACC ACROSS ADLT BRK1 71CM

## (undated) DEVICE — INTRO SHEATH ART/FEM ENGAGE .038 6F12CM

## (undated) DEVICE — CONTRL CONTRST CHMBRD W/TBG72IN REUS

## (undated) DEVICE — GW XCHG AMPLTZ XSTIF PTFE CRV .035IN 3X180CM

## (undated) DEVICE — Device: Brand: MEDEX

## (undated) DEVICE — ABLATION CATHETER: Brand: INTELLANAV MIFI™ OPEN-IRRIGATED

## (undated) DEVICE — DIL VESL .038 12F 19CM

## (undated) DEVICE — CATH DIAG EXPO .052 PIG145 6F 110CM

## (undated) DEVICE — MSK ENDO PORT O2 POM ELITE CURAPLEX A/